# Patient Record
Sex: FEMALE | Race: WHITE | NOT HISPANIC OR LATINO | Employment: FULL TIME | URBAN - NONMETROPOLITAN AREA
[De-identification: names, ages, dates, MRNs, and addresses within clinical notes are randomized per-mention and may not be internally consistent; named-entity substitution may affect disease eponyms.]

---

## 2017-02-01 ENCOUNTER — APPOINTMENT (OUTPATIENT)
Dept: GENERAL RADIOLOGY | Facility: HOSPITAL | Age: 22
End: 2017-02-01

## 2017-02-01 ENCOUNTER — HOSPITAL ENCOUNTER (EMERGENCY)
Facility: HOSPITAL | Age: 22
Discharge: HOME | End: 2017-02-01
Attending: FAMILY MEDICINE | Admitting: FAMILY MEDICINE

## 2017-02-01 ENCOUNTER — APPOINTMENT (OUTPATIENT)
Dept: CT IMAGING | Facility: HOSPITAL | Age: 22
End: 2017-02-01

## 2017-02-01 VITALS
TEMPERATURE: 97.9 F | DIASTOLIC BLOOD PRESSURE: 62 MMHG | WEIGHT: 115 LBS | BODY MASS INDEX: 17.43 KG/M2 | SYSTOLIC BLOOD PRESSURE: 103 MMHG | HEIGHT: 68 IN | HEART RATE: 67 BPM | OXYGEN SATURATION: 99 % | RESPIRATION RATE: 16 BRPM

## 2017-02-01 DIAGNOSIS — R55 SYNCOPE, UNSPECIFIED SYNCOPE TYPE: Primary | ICD-10-CM

## 2017-02-01 LAB
ALBUMIN SERPL-MCNC: 3.9 G/DL (ref 3.5–5)
ALBUMIN/GLOB SERPL: 1.3 G/DL (ref 1–2)
ALP SERPL-CCNC: 64 U/L (ref 38–126)
ALT SERPL W P-5'-P-CCNC: 23 U/L (ref 13–69)
AMPHET+METHAMPHET UR QL: NEGATIVE
AMPHETAMINES UR QL: NEGATIVE
ANION GAP SERPL CALCULATED.3IONS-SCNC: 15.6 MMOL/L
AST SERPL-CCNC: 19 U/L (ref 15–46)
B-HCG UR QL: NEGATIVE
BACTERIA UR QL AUTO: ABNORMAL /HPF
BARBITURATES UR QL SCN: NEGATIVE
BASOPHILS # BLD AUTO: 0.03 10*3/MM3 (ref 0–0.2)
BASOPHILS NFR BLD AUTO: 0.5 % (ref 0–2.5)
BENZODIAZ UR QL SCN: NEGATIVE
BILIRUB SERPL-MCNC: 0.4 MG/DL (ref 0.2–1.3)
BILIRUB UR QL STRIP: NEGATIVE
BUN BLD-MCNC: 17 MG/DL (ref 7–20)
BUN/CREAT SERPL: 28.3 (ref 7.1–23.5)
BUPRENORPHINE SERPL-MCNC: NEGATIVE NG/ML
CALCIUM SPEC-SCNC: 8.8 MG/DL (ref 8.4–10.2)
CANNABINOIDS SERPL QL: NEGATIVE
CHLORIDE SERPL-SCNC: 105 MMOL/L (ref 98–107)
CLARITY UR: CLEAR
CO2 SERPL-SCNC: 28 MMOL/L (ref 26–30)
COCAINE UR QL: NEGATIVE
COLOR UR: ABNORMAL
CREAT BLD-MCNC: 0.6 MG/DL (ref 0.6–1.3)
D-DIMER, QUANTITATIVE (MAD,POW, STR): 169 NG/ML (FEU) (ref 0–500)
DEPRECATED RDW RBC AUTO: 39.9 FL (ref 37–54)
EOSINOPHIL # BLD AUTO: 0.08 10*3/MM3 (ref 0–0.7)
EOSINOPHIL NFR BLD AUTO: 1.3 % (ref 0–7)
ERYTHROCYTE [DISTWIDTH] IN BLOOD BY AUTOMATED COUNT: 11.7 % (ref 11.5–14.5)
GFR SERPL CREATININE-BSD FRML MDRD: 126 ML/MIN/1.73
GLOBULIN UR ELPH-MCNC: 3.1 GM/DL
GLUCOSE BLD-MCNC: 100 MG/DL (ref 74–98)
GLUCOSE BLDC GLUCOMTR-MCNC: 85 MG/DL (ref 70–130)
GLUCOSE UR STRIP-MCNC: NEGATIVE MG/DL
HCT VFR BLD AUTO: 36.8 % (ref 37–47)
HGB BLD-MCNC: 12.3 G/DL (ref 12–16)
HGB UR QL STRIP.AUTO: NEGATIVE
HOLD SPECIMEN: NORMAL
HOLD SPECIMEN: NORMAL
HYALINE CASTS UR QL AUTO: ABNORMAL /LPF
IMM GRANULOCYTES # BLD: 0.01 10*3/MM3 (ref 0–0.06)
IMM GRANULOCYTES NFR BLD: 0.2 % (ref 0–0.6)
KETONES UR QL STRIP: ABNORMAL
LEUKOCYTE ESTERASE UR QL STRIP.AUTO: NEGATIVE
LYMPHOCYTES # BLD AUTO: 2.49 10*3/MM3 (ref 0.6–3.4)
LYMPHOCYTES NFR BLD AUTO: 39.5 % (ref 10–50)
MCH RBC QN AUTO: 31.1 PG (ref 27–31)
MCHC RBC AUTO-ENTMCNC: 33.4 G/DL (ref 30–37)
MCV RBC AUTO: 93.2 FL (ref 81–99)
METHADONE UR QL SCN: NEGATIVE
MONOCYTES # BLD AUTO: 0.58 10*3/MM3 (ref 0–0.9)
MONOCYTES NFR BLD AUTO: 9.2 % (ref 0–12)
MUCOUS THREADS URNS QL MICRO: ABNORMAL /HPF
NEUTROPHILS # BLD AUTO: 3.11 10*3/MM3 (ref 2–6.9)
NEUTROPHILS NFR BLD AUTO: 49.3 % (ref 37–80)
NITRITE UR QL STRIP: NEGATIVE
NRBC BLD MANUAL-RTO: 0 /100 WBC (ref 0–0)
OPIATES UR QL: NEGATIVE
OXYCODONE UR QL SCN: NEGATIVE
PCP UR QL SCN: NEGATIVE
PH UR STRIP.AUTO: 6 [PH] (ref 5–8)
PLATELET # BLD AUTO: 232 10*3/MM3 (ref 130–400)
PMV BLD AUTO: 9.6 FL (ref 6–12)
POTASSIUM BLD-SCNC: 3.6 MMOL/L (ref 3.5–5.1)
PROPOXYPH UR QL: NEGATIVE
PROT SERPL-MCNC: 7 G/DL (ref 6.3–8.2)
PROT UR QL STRIP: ABNORMAL
RBC # BLD AUTO: 3.95 10*6/MM3 (ref 4.2–5.4)
RBC # UR: ABNORMAL /HPF
REF LAB TEST METHOD: ABNORMAL
SODIUM BLD-SCNC: 145 MMOL/L (ref 137–145)
SP GR UR STRIP: >=1.03 (ref 1–1.03)
SQUAMOUS #/AREA URNS HPF: ABNORMAL /HPF
TRICYCLICS UR QL SCN: NEGATIVE
TROPONIN I SERPL-MCNC: <0.012 NG/ML (ref 0–0.03)
UROBILINOGEN UR QL STRIP: ABNORMAL
WBC NRBC COR # BLD: 6.3 10*3/MM3 (ref 4.8–10.8)
WBC UR QL AUTO: ABNORMAL /HPF
WHOLE BLOOD HOLD SPECIMEN: NORMAL
WHOLE BLOOD HOLD SPECIMEN: NORMAL

## 2017-02-01 PROCEDURE — 82962 GLUCOSE BLOOD TEST: CPT

## 2017-02-01 PROCEDURE — 85025 COMPLETE CBC W/AUTO DIFF WBC: CPT | Performed by: FAMILY MEDICINE

## 2017-02-01 PROCEDURE — 81001 URINALYSIS AUTO W/SCOPE: CPT | Performed by: FAMILY MEDICINE

## 2017-02-01 PROCEDURE — 71010 HC CHEST PA OR AP: CPT

## 2017-02-01 PROCEDURE — 80053 COMPREHEN METABOLIC PANEL: CPT | Performed by: FAMILY MEDICINE

## 2017-02-01 PROCEDURE — 70450 CT HEAD/BRAIN W/O DYE: CPT

## 2017-02-01 PROCEDURE — 93005 ELECTROCARDIOGRAM TRACING: CPT | Performed by: FAMILY MEDICINE

## 2017-02-01 PROCEDURE — 96360 HYDRATION IV INFUSION INIT: CPT

## 2017-02-01 PROCEDURE — 85379 FIBRIN DEGRADATION QUANT: CPT | Performed by: FAMILY MEDICINE

## 2017-02-01 PROCEDURE — 96361 HYDRATE IV INFUSION ADD-ON: CPT

## 2017-02-01 PROCEDURE — 81025 URINE PREGNANCY TEST: CPT | Performed by: FAMILY MEDICINE

## 2017-02-01 PROCEDURE — 99284 EMERGENCY DEPT VISIT MOD MDM: CPT

## 2017-02-01 PROCEDURE — 84484 ASSAY OF TROPONIN QUANT: CPT | Performed by: FAMILY MEDICINE

## 2017-02-01 PROCEDURE — 80306 DRUG TEST PRSMV INSTRMNT: CPT | Performed by: FAMILY MEDICINE

## 2017-02-01 RX ORDER — SODIUM CHLORIDE 0.9 % (FLUSH) 0.9 %
10 SYRINGE (ML) INJECTION AS NEEDED
Status: DISCONTINUED | OUTPATIENT
Start: 2017-02-01 | End: 2017-02-01 | Stop reason: HOSPADM

## 2017-02-01 RX ADMIN — SODIUM CHLORIDE 1000 ML: 9 INJECTION, SOLUTION INTRAVENOUS at 01:14

## 2017-02-01 NOTE — ED PROVIDER NOTES
Subjective   HPI Comments: 21 yr old WF presenting to ED from lab upstairs for 2 episodes of syncope. Pt with history of ongoing sporadic syncope. Pt had initial syncope around midnight. Pt was red in face, shaky, weak, co-worker helped her to break room, then 10-15 min later had similar symptoms but then dozing in and out of consciousness and brought to ED. Co-worker never reported full syncope but more altered mental status and lethargy. Pt states she has been doing this for months. No chest pain. No history of CT of head. Last period 1 week ago. No abd pain or n/v. No history of seizures. No current headache. No visual changes. Pt states she feels tired. Multiple work-ups in the past in ED and seen by ENT for ? Menierre's disease. No SOA.       History provided by:  Patient      Review of Systems   Constitutional: Positive for activity change and fatigue. Negative for chills, diaphoresis and fever.   HENT: Negative for ear pain, rhinorrhea, sinus pressure, sore throat, trouble swallowing and voice change.    Eyes: Negative for photophobia, pain and visual disturbance.   Respiratory: Negative for cough and shortness of breath.    Cardiovascular: Negative for chest pain.   Gastrointestinal: Negative for abdominal pain, diarrhea, nausea and vomiting.   Genitourinary: Negative for dysuria and flank pain.   Musculoskeletal: Negative for back pain, neck pain and neck stiffness.   Skin: Negative for color change, rash and wound.   Neurological: Positive for dizziness, syncope, weakness and light-headedness. Negative for tremors, seizures and headaches.   Hematological: Negative for adenopathy. Does not bruise/bleed easily.   Psychiatric/Behavioral: Positive for confusion.       Past Medical History   Diagnosis Date   • Allergic    • Anemia    • Anxiety    • Arthritis 9/13/2016   • Chronic headaches 9/13/2016   • Collapse    • COPD (chronic obstructive pulmonary disease) 9/13/2016   • Deep vein thrombosis    • Depression     • Hematuria    • Hepatitis B 9/13/2016   • History of drug abuse    • Pneumonia 9/13/2016       Allergies   Allergen Reactions   • Penicillins Hives   • Iodine Rash   • Latex Rash       Past Surgical History   Procedure Laterality Date   • Tonsillectomy     • Adenoidectomy     • Cystoscopy     • Brain surgery       multiple        Family History   Problem Relation Age of Onset   • Heart disease Mother    • Hypertension Mother    • Heart disease Father    • Hypertension Father    • Hyperlipidemia Father    • Liver disease Father    • Arthritis Maternal Grandmother    • Cancer Maternal Grandmother    • Kidney disease Maternal Grandmother    • Stroke Maternal Grandmother    • Arthritis Maternal Grandfather    • Cancer Maternal Grandfather    • Stroke Maternal Grandfather    • Cancer Paternal Grandmother    • Cancer Paternal Grandfather    • Stroke Paternal Grandfather        Social History     Social History   • Marital status: Single     Spouse name: N/A   • Number of children: N/A   • Years of education: N/A     Social History Main Topics   • Smoking status: Never Smoker   • Smokeless tobacco: Never Used   • Alcohol use No   • Drug use: No   • Sexual activity: Defer     Other Topics Concern   • None     Social History Narrative   • None           Objective   Physical Exam   Constitutional: She is oriented to person, place, and time. She appears well-developed and well-nourished. She appears lethargic. No distress.   HENT:   Head: Normocephalic and atraumatic.   Right Ear: Tympanic membrane is not injected.   Left Ear: Tympanic membrane is not injected.   Nose: Nose normal.   Mouth/Throat: Oropharynx is clear and moist.   Eyes: Conjunctivae and EOM are normal. Pupils are equal, round, and reactive to light. Right eye exhibits no nystagmus. Left eye exhibits no nystagmus.   Neck: Normal range of motion. Neck supple.   Cardiovascular: Normal rate, regular rhythm and normal heart sounds.    Pulmonary/Chest: Effort  normal and breath sounds normal.   Abdominal: Soft. There is no tenderness.   Musculoskeletal: Normal range of motion.   Lymphadenopathy:     She has no cervical adenopathy.   Neurological: She is oriented to person, place, and time. She appears lethargic. No cranial nerve deficit or sensory deficit. She exhibits normal muscle tone. Coordination normal. GCS eye subscore is 4. GCS verbal subscore is 5. GCS motor subscore is 6.   Skin: Skin is warm and dry. No rash noted. She is not diaphoretic. There is pallor.   Psychiatric: Her speech is normal. Her affect is blunt. She is slowed. She is not actively hallucinating.       Procedures  ECG - HR 83; NSR; Normal CT/QRS; Normal axis; No ST-T changes  CT of Head - No acute intracranial process  CXR - No acute cardiopulmonary process       ED Course  ED Course      2:42 - Pt stable; No acute changes, seizure, further syncope, palpitations, or subjective complaint. CT head negative, negative labs. Pt without lethargy, back to baseline. Will d/c home and set up to see Neurology for EEG study and then to cardiology for Echo or further work-up.     CT Head Without Contrast   ED Interpretation   Radiology read - No acute intracranial process      XR Chest 1 View   ED Interpretation   No acute cardiopulmonary process              MDM    Final diagnoses:   Syncope, unspecified syncope type            Thor Vasquez, DO  02/01/17 0247       Thor Vasquez, DO  02/01/17 0520

## 2017-02-05 ENCOUNTER — HOSPITAL ENCOUNTER (EMERGENCY)
Facility: HOSPITAL | Age: 22
Discharge: HOME | End: 2017-02-05
Attending: EMERGENCY MEDICINE | Admitting: EMERGENCY MEDICINE

## 2017-02-05 ENCOUNTER — APPOINTMENT (OUTPATIENT)
Dept: GENERAL RADIOLOGY | Facility: HOSPITAL | Age: 22
End: 2017-02-05

## 2017-02-05 ENCOUNTER — APPOINTMENT (OUTPATIENT)
Dept: NEUROLOGY | Facility: HOSPITAL | Age: 22
End: 2017-02-05
Attending: EMERGENCY MEDICINE

## 2017-02-05 VITALS
SYSTOLIC BLOOD PRESSURE: 110 MMHG | HEIGHT: 68 IN | BODY MASS INDEX: 18.19 KG/M2 | DIASTOLIC BLOOD PRESSURE: 69 MMHG | WEIGHT: 120 LBS | TEMPERATURE: 98.2 F | RESPIRATION RATE: 18 BRPM | OXYGEN SATURATION: 98 % | HEART RATE: 83 BPM

## 2017-02-05 DIAGNOSIS — R55 SYNCOPE, UNSPECIFIED SYNCOPE TYPE: Primary | ICD-10-CM

## 2017-02-05 LAB
ALBUMIN SERPL-MCNC: 4.7 G/DL (ref 3.2–4.8)
ALBUMIN/GLOB SERPL: 1.6 G/DL (ref 1.5–2.5)
ALP SERPL-CCNC: 95 U/L (ref 25–100)
ALT SERPL W P-5'-P-CCNC: 6 U/L (ref 7–40)
AMPHET+METHAMPHET UR QL: NEGATIVE
AMPHETAMINES UR QL: NEGATIVE
ANION GAP SERPL CALCULATED.3IONS-SCNC: 2 MMOL/L (ref 3–11)
APTT PPP: 30.5 SECONDS (ref 24–31)
AST SERPL-CCNC: 14 U/L (ref 0–33)
B-HCG UR QL: NEGATIVE
BACTERIA UR QL AUTO: ABNORMAL /HPF
BARBITURATES UR QL SCN: NEGATIVE
BASOPHILS # BLD AUTO: 0.02 10*3/MM3 (ref 0–0.2)
BASOPHILS NFR BLD AUTO: 0.3 % (ref 0–1)
BENZODIAZ UR QL SCN: NEGATIVE
BILIRUB SERPL-MCNC: 0.4 MG/DL (ref 0.3–1.2)
BILIRUB UR QL STRIP: NEGATIVE
BUN BLD-MCNC: 10 MG/DL (ref 9–23)
BUN/CREAT SERPL: 16.7 (ref 7–25)
BUPRENORPHINE SERPL-MCNC: NEGATIVE NG/ML
CALCIUM SPEC-SCNC: 9.8 MG/DL (ref 8.7–10.4)
CANNABINOIDS SERPL QL: NEGATIVE
CHLORIDE SERPL-SCNC: 107 MMOL/L (ref 99–109)
CLARITY UR: CLEAR
CO2 SERPL-SCNC: 32 MMOL/L (ref 20–31)
COCAINE UR QL: NEGATIVE
COLOR UR: YELLOW
CREAT BLD-MCNC: 0.6 MG/DL (ref 0.6–1.3)
DEPRECATED RDW RBC AUTO: 40.4 FL (ref 37–54)
EOSINOPHIL # BLD AUTO: 0.07 10*3/MM3 (ref 0.1–0.3)
EOSINOPHIL NFR BLD AUTO: 1.2 % (ref 0–3)
ERYTHROCYTE [DISTWIDTH] IN BLOOD BY AUTOMATED COUNT: 11.9 % (ref 11.3–14.5)
GFR SERPL CREATININE-BSD FRML MDRD: 126 ML/MIN/1.73
GLOBULIN UR ELPH-MCNC: 3 GM/DL
GLUCOSE BLD-MCNC: 78 MG/DL (ref 70–100)
GLUCOSE UR STRIP-MCNC: NEGATIVE MG/DL
HCT VFR BLD AUTO: 40.8 % (ref 34.5–44)
HGB BLD-MCNC: 13.6 G/DL (ref 11.5–15.5)
HGB UR QL STRIP.AUTO: NEGATIVE
HYALINE CASTS UR QL AUTO: ABNORMAL /LPF
IMM GRANULOCYTES # BLD: 0.01 10*3/MM3 (ref 0–0.03)
IMM GRANULOCYTES NFR BLD: 0.2 % (ref 0–0.6)
INR PPP: 1.04
KETONES UR QL STRIP: NEGATIVE
LEUKOCYTE ESTERASE UR QL STRIP.AUTO: ABNORMAL
LYMPHOCYTES # BLD AUTO: 1.81 10*3/MM3 (ref 0.6–4.8)
LYMPHOCYTES NFR BLD AUTO: 30 % (ref 24–44)
MAGNESIUM SERPL-MCNC: 1.8 MG/DL (ref 1.3–2.7)
MCH RBC QN AUTO: 31.3 PG (ref 27–31)
MCHC RBC AUTO-ENTMCNC: 33.3 G/DL (ref 32–36)
MCV RBC AUTO: 94 FL (ref 80–99)
METHADONE UR QL SCN: NEGATIVE
MONOCYTES # BLD AUTO: 0.51 10*3/MM3 (ref 0–1)
MONOCYTES NFR BLD AUTO: 8.5 % (ref 0–12)
NEUTROPHILS # BLD AUTO: 3.61 10*3/MM3 (ref 1.5–8.3)
NEUTROPHILS NFR BLD AUTO: 59.8 % (ref 41–71)
NITRITE UR QL STRIP: NEGATIVE
OPIATES UR QL: NEGATIVE
OXYCODONE UR QL SCN: NEGATIVE
PCP UR QL SCN: NEGATIVE
PH UR STRIP.AUTO: 6 [PH] (ref 5–8)
PLATELET # BLD AUTO: 254 10*3/MM3 (ref 150–450)
PMV BLD AUTO: 9.4 FL (ref 6–12)
POTASSIUM BLD-SCNC: 3.5 MMOL/L (ref 3.5–5.5)
PROPOXYPH UR QL: NEGATIVE
PROT SERPL-MCNC: 7.7 G/DL (ref 5.7–8.2)
PROT UR QL STRIP: NEGATIVE
PROTHROMBIN TIME: 11.3 SECONDS (ref 9.6–11.5)
RBC # BLD AUTO: 4.34 10*6/MM3 (ref 3.89–5.14)
RBC # UR: ABNORMAL /HPF
REF LAB TEST METHOD: ABNORMAL
SODIUM BLD-SCNC: 141 MMOL/L (ref 132–146)
SP GR UR STRIP: 1.01 (ref 1–1.03)
SQUAMOUS #/AREA URNS HPF: ABNORMAL /HPF
TRICYCLICS UR QL SCN: NEGATIVE
TSH SERPL DL<=0.05 MIU/L-ACNC: 0.9 MIU/ML (ref 0.35–5.35)
UROBILINOGEN UR QL STRIP: ABNORMAL
WBC NRBC COR # BLD: 6.03 10*3/MM3 (ref 4.5–13.5)
WBC UR QL AUTO: ABNORMAL /HPF

## 2017-02-05 PROCEDURE — 85025 COMPLETE CBC W/AUTO DIFF WBC: CPT | Performed by: EMERGENCY MEDICINE

## 2017-02-05 PROCEDURE — 93005 ELECTROCARDIOGRAM TRACING: CPT | Performed by: EMERGENCY MEDICINE

## 2017-02-05 PROCEDURE — 95816 EEG AWAKE AND DROWSY: CPT

## 2017-02-05 PROCEDURE — 96360 HYDRATION IV INFUSION INIT: CPT

## 2017-02-05 PROCEDURE — 96361 HYDRATE IV INFUSION ADD-ON: CPT

## 2017-02-05 PROCEDURE — 81025 URINE PREGNANCY TEST: CPT | Performed by: EMERGENCY MEDICINE

## 2017-02-05 PROCEDURE — 81001 URINALYSIS AUTO W/SCOPE: CPT | Performed by: EMERGENCY MEDICINE

## 2017-02-05 PROCEDURE — 80306 DRUG TEST PRSMV INSTRMNT: CPT | Performed by: EMERGENCY MEDICINE

## 2017-02-05 PROCEDURE — 85610 PROTHROMBIN TIME: CPT | Performed by: EMERGENCY MEDICINE

## 2017-02-05 PROCEDURE — 71010 HC CHEST PA OR AP: CPT

## 2017-02-05 PROCEDURE — 84443 ASSAY THYROID STIM HORMONE: CPT | Performed by: EMERGENCY MEDICINE

## 2017-02-05 PROCEDURE — 80053 COMPREHEN METABOLIC PANEL: CPT | Performed by: EMERGENCY MEDICINE

## 2017-02-05 PROCEDURE — 85730 THROMBOPLASTIN TIME PARTIAL: CPT | Performed by: EMERGENCY MEDICINE

## 2017-02-05 PROCEDURE — 83735 ASSAY OF MAGNESIUM: CPT | Performed by: EMERGENCY MEDICINE

## 2017-02-05 PROCEDURE — 87086 URINE CULTURE/COLONY COUNT: CPT | Performed by: EMERGENCY MEDICINE

## 2017-02-05 PROCEDURE — 99285 EMERGENCY DEPT VISIT HI MDM: CPT

## 2017-02-05 RX ORDER — SODIUM CHLORIDE 9 MG/ML
125 INJECTION, SOLUTION INTRAVENOUS CONTINUOUS
Status: DISCONTINUED | OUTPATIENT
Start: 2017-02-05 | End: 2017-02-05 | Stop reason: HOSPADM

## 2017-02-05 RX ADMIN — SODIUM CHLORIDE 500 ML: 9 INJECTION, SOLUTION INTRAVENOUS at 14:30

## 2017-02-05 NOTE — DISCHARGE INSTRUCTIONS
Follow up with Dr. Lawrence for evaluation of your syncopal episodes.  Your EEG today was negative, but he needs to see you in follow-up to evaluate these episodes and discussed further testing or imaging studies as required    Follow up with your primary medical provider for re-evaluation of your current symptoms.     Follow up with Dr. Jimenez, cardiology for evaluation of a cardiac etiology of your symptoms.    No dangerous activity with no bathing alone no swimming no elevated work or work around dangerous tools or substances    No driving per K RS for 3 months or until released by your doctor    Immediately return to the emergency department for any new or worsening symptoms.

## 2017-02-05 NOTE — ED PROVIDER NOTES
Subjective   HPI Comments: Mrs. Cleveland is a 21 y.o female who presents to the ED c/o syncope earlier today. She notes that she has an average of two episodes of syncope every week with increasing frequency over the last couple months. She notes that her syncope is always preceded by feelings of drowsiness. She states she is unable to remember anything after her drowsiness. She also notes that she had a sz a couple days ago and reports of shaking, eyes open, cold, head nodding, tremors, and total LOC. She denies any other acute sx or hx at this time.       Patient is a 21 y.o. female presenting with syncope.   History provided by:  Patient  Syncope   Episode history:  Multiple  Most recent episode:  Today  Timing:  Sporadic  Progression:  Unchanged  Chronicity:  Recurrent  Witnessed: no    Relieved by:  None tried  Worsened by:  Nothing  Ineffective treatments:  None tried  Associated symptoms: no fever, no nausea and no vomiting        Review of Systems   Constitutional: Negative for chills and fever.   HENT: Negative for congestion, rhinorrhea and sore throat.    Respiratory: Negative for cough.    Cardiovascular: Positive for syncope.   Gastrointestinal: Negative for abdominal pain, blood in stool, diarrhea, nausea and vomiting.   Neurological: Positive for syncope.   All other systems reviewed and are negative.      Past Medical History   Diagnosis Date   • Allergic    • Anemia    • Anxiety    • Arthritis 9/13/2016   • Chronic headaches 9/13/2016   • Collapse    • Depression    • Hematuria    • History of drug abuse      patient denies this   • Pneumonia 9/13/2016       Allergies   Allergen Reactions   • Penicillins Hives   • Iodine Rash   • Latex Rash       Past Surgical History   Procedure Laterality Date   • Tonsillectomy     • Adenoidectomy     • Cystoscopy     • Cystoscopy         Family History   Problem Relation Age of Onset   • Heart disease Mother    • Hypertension Mother    • Heart disease Father    •  Hypertension Father    • Hyperlipidemia Father    • Liver disease Father    • Arthritis Maternal Grandmother    • Cancer Maternal Grandmother    • Kidney disease Maternal Grandmother    • Stroke Maternal Grandmother    • Arthritis Maternal Grandfather    • Cancer Maternal Grandfather    • Stroke Maternal Grandfather    • Cancer Paternal Grandmother    • Cancer Paternal Grandfather    • Stroke Paternal Grandfather        Social History     Social History   • Marital status: Single     Spouse name: N/A   • Number of children: N/A   • Years of education: N/A     Social History Main Topics   • Smoking status: Never Smoker   • Smokeless tobacco: Never Used   • Alcohol use Yes      Comment: seldom   • Drug use: No   • Sexual activity: Defer     Other Topics Concern   • None     Social History Narrative    Lives with her parents         Objective   Physical Exam   Constitutional: She is oriented to person, place, and time. She appears well-developed and well-nourished. No distress.   HENT:   Head: Normocephalic and atraumatic.   Mouth/Throat: Oropharynx is clear and moist.   Eyes: Conjunctivae are normal.   Neck: Normal range of motion. Neck supple.   Cardiovascular: Normal rate, regular rhythm, normal heart sounds and intact distal pulses.    Pulmonary/Chest: Effort normal and breath sounds normal. No respiratory distress.   Abdominal: Soft. There is no tenderness.   Musculoskeletal: Normal range of motion.   Neurological: She is alert and oriented to person, place, and time. She has normal strength and normal reflexes. No cranial nerve deficit or sensory deficit.   Skin: Skin is warm and dry.   Psychiatric: She has a normal mood and affect. Her speech is normal and behavior is normal.   Nursing note and vitals reviewed.      Procedures         ED Course  ED Course   Comment By Time   Discussed findings at length with the patient.  Her symptoms are gone on for 2 years, but accelerated over the last several months.  I  reviewed her old records and she had a CT scan from last week which was negative.  I've ordered an EEG today in view of the possibility that she may have had seizures.  I looked over her bystanders account of her last event and does not appear that there was any tonic-clonic activity or true seizure type activity.I discussed MRI with the patient, but she just had a CT scan done within the last week with greater than 2 years worth of symptoms.  I discussed the risks and benefits of this.  She will have this done as an outpatient declines inpatient ER MRI today. Lc Castaneda MD 02/05 1419   Patient's examination is unremarkable currently.  She has no neurologic deficit.  I discussed her restriction from driving for 3 months per K RS.  Patient verbalizes understanding and agreement. Lc Castaneda MD 02/05 6710   Reviewed the findings here with the patient at length.  I discussed that her EEG would be read within the next 24 hours and will be available to Dr. Lawrence this week in the office.I reviewed her old records at length along with her previous studies including her imaging studies.  I reviewed this with her.  In discussion with the patient this is her 11th ER visit for identical symptoms.  She has never been An appointment with neurology.  She has had no successful cardiovascular evaluation and reports she is dismissed multiple appointments with her PCP as well and has not followed up with her recently.I discussed the need to have this successfully evaluated on outpatient basis.  I discussed neurologic, cardiac, and PCP follow-up.  I discussed again driving restrictionVery pleasant patient verbalizes understanding and agreement with the plan of care Lc Castaneda MD 02/05 3902   Dr. Shine, neurology called.  The patient's EEG is negative for epilepsy Lc Castaneda MD 02/05 4162   Bystanders with the patient arrived and reportedly indicated the patient that this was syncope without visualized seizure activity  from the bystanders Lc Castaneda MD 02/05 1703     Recent Results (from the past 24 hour(s))   Urinalysis With / Culture If Indicated    Collection Time: 02/05/17  1:39 PM   Result Value Ref Range    Color, UA Yellow Yellow, Straw    Appearance, UA Clear Clear    pH, UA 6.0 5.0 - 8.0    Specific Gravity, UA 1.010 1.001 - 1.030    Glucose, UA Negative Negative    Ketones, UA Negative Negative    Bilirubin, UA Negative Negative    Blood, UA Negative Negative    Protein, UA Negative Negative    Leuk Esterase, UA Trace (A) Negative    Nitrite, UA Negative Negative    Urobilinogen, UA 0.2 E.U./dL 0.2 - 1.0 E.U./dL   Urinalysis, Microscopic Only    Collection Time: 02/05/17  1:39 PM   Result Value Ref Range    RBC, UA None Seen None Seen, 0-2 /HPF    WBC, UA 6-12 (A) None Seen /HPF    Bacteria, UA None Seen None Seen, Trace /HPF    Squamous Epithelial Cells, UA 0-2 None Seen, 0-2 /HPF    Hyaline Casts, UA None Seen 0 - 6 /LPF    Methodology Automated Microscopy    Pregnancy, Urine    Collection Time: 02/05/17  1:39 PM   Result Value Ref Range    HCG, Urine QL Negative Negative   Urine Drug Screen    Collection Time: 02/05/17  1:39 PM   Result Value Ref Range    THC, Screen, Urine Negative Negative    Phencyclidine (PCP), Urine Negative Negative    Cocaine Screen, Urine Negative Negative    Methamphetamine, Urine Negative Negative    Opiate Screen Negative Negative    Amphetamine Screen, Urine Negative Negative    Benzodiazepine Screen, Urine Negative Negative    Tricyclic Antidepressants Screen Negative Negative    Methadone Screen, Urine Negative Negative    Barbiturates Screen, Urine Negative Negative    Oxycodone Screen, Urine Negative Negative    Propoxyphene Screen Negative Negative    Buprenorphine, Screen, Urine Negative Negative   Comprehensive Metabolic Panel    Collection Time: 02/05/17  2:41 PM   Result Value Ref Range    Glucose 78 70 - 100 mg/dL    BUN 10 9 - 23 mg/dL    Creatinine 0.60 0.60 - 1.30 mg/dL     Sodium 141 132 - 146 mmol/L    Potassium 3.5 3.5 - 5.5 mmol/L    Chloride 107 99 - 109 mmol/L    CO2 32.0 (H) 20.0 - 31.0 mmol/L    Calcium 9.8 8.7 - 10.4 mg/dL    Total Protein 7.7 5.7 - 8.2 g/dL    Albumin 4.70 3.20 - 4.80 g/dL    ALT (SGPT) 6 (L) 7 - 40 U/L    AST (SGOT) 14 0 - 33 U/L    Alkaline Phosphatase 95 25 - 100 U/L    Total Bilirubin 0.4 0.3 - 1.2 mg/dL    eGFR Non African Amer 126 >60 mL/min/1.73    Globulin 3.0 gm/dL    A/G Ratio 1.6 1.5 - 2.5 g/dL    BUN/Creatinine Ratio 16.7 7.0 - 25.0    Anion Gap 2.0 (L) 3.0 - 11.0 mmol/L   Protime-INR    Collection Time: 02/05/17  2:41 PM   Result Value Ref Range    Protime 11.3 9.6 - 11.5 Seconds    INR 1.04    aPTT    Collection Time: 02/05/17  2:41 PM   Result Value Ref Range    PTT 30.5 24.0 - 31.0 seconds   Magnesium    Collection Time: 02/05/17  2:41 PM   Result Value Ref Range    Magnesium 1.8 1.3 - 2.7 mg/dL   TSH    Collection Time: 02/05/17  2:41 PM   Result Value Ref Range    TSH 0.899 0.350 - 5.350 mIU/mL   CBC Auto Differential    Collection Time: 02/05/17  2:41 PM   Result Value Ref Range    WBC 6.03 4.50 - 13.50 10*3/mm3    RBC 4.34 3.89 - 5.14 10*6/mm3    Hemoglobin 13.6 11.5 - 15.5 g/dL    Hematocrit 40.8 34.5 - 44.0 %    MCV 94.0 80.0 - 99.0 fL    MCH 31.3 (H) 27.0 - 31.0 pg    MCHC 33.3 32.0 - 36.0 g/dL    RDW 11.9 11.3 - 14.5 %    RDW-SD 40.4 37.0 - 54.0 fl    MPV 9.4 6.0 - 12.0 fL    Platelets 254 150 - 450 10*3/mm3    Neutrophil % 59.8 41.0 - 71.0 %    Lymphocyte % 30.0 24.0 - 44.0 %    Monocyte % 8.5 0.0 - 12.0 %    Eosinophil % 1.2 0.0 - 3.0 %    Basophil % 0.3 0.0 - 1.0 %    Immature Grans % 0.2 0.0 - 0.6 %    Neutrophils, Absolute 3.61 1.50 - 8.30 10*3/mm3    Lymphocytes, Absolute 1.81 0.60 - 4.80 10*3/mm3    Monocytes, Absolute 0.51 0.00 - 1.00 10*3/mm3    Eosinophils, Absolute 0.07 (L) 0.10 - 0.30 10*3/mm3    Basophils, Absolute 0.02 0.00 - 0.20 10*3/mm3    Immature Grans, Absolute 0.01 0.00 - 0.03 10*3/mm3     Note: In addition to  lab results from this visit, the labs listed above may include labs taken at another facility or during a different encounter within the last 24 hours. Please correlate lab times with ED admission and discharge times for further clarification of the services performed during this visit.    XR Chest 1 View    (Results Pending)     Vitals:    02/05/17 1600 02/05/17 1601 02/05/17 1630 02/05/17 1702   BP: 111/68  110/69    BP Location:       Patient Position:       Pulse:  90 81 83   Resp:    18   Temp:       TempSrc:       SpO2:  96% 97% 98%   Weight:       Height:         Medications   sodium chloride 0.9 % infusion (not administered)   sodium chloride 0.9 % bolus 500 mL (0 mL Intravenous Stopped 2/5/17 1655)     ECG/EMG Results (last 24 hours)     Procedure Component Value Units Date/Time    ECG 12 Lead [[id number]] Collected:  02/05/17 1431     Updated:  02/05/17 1618    Narrative:       Test Reason : syncope  Blood Pressure : **/** mmHG  Vent. Rate : 089 BPM     Atrial Rate : 089 BPM     P-R Int : 140 ms          QRS Dur : 078 ms      QT Int : 360 ms       P-R-T Axes : 079 071 058 degrees     QTc Int : 438 ms    Normal sinus rhythm  No previous ECGs available  Confirmed by GABRIELE EDGE, RADHA ([id number]) on 2/5/2017 4:18:04 PM    Referred By:  EDMD           Confirmed By:RADHA SUAZO MD                          MDM  Number of Diagnoses or Management Options  Syncope, unspecified syncope type: new and requires workup     Amount and/or Complexity of Data Reviewed  Clinical lab tests: ordered and reviewed  Tests in the radiology section of CPT®: ordered and reviewed  Review and summarize past medical records: yes  Independent visualization of images, tracings, or specimens: yes        Final diagnoses:   Syncope, unspecified syncope type       Documentation assistance provided by bryn MARCH.  Information recorded by the scribe was done at my direction and has been verified and validated by me.     Nena March  02/05/17  1417       Georgina Juliocesar  02/05/17 1706       Lc Castaneda MD  02/05/17 1704

## 2017-02-07 ENCOUNTER — OFFICE VISIT (OUTPATIENT)
Dept: FAMILY MEDICINE CLINIC | Facility: CLINIC | Age: 22
End: 2017-02-07

## 2017-02-07 VITALS
OXYGEN SATURATION: 100 % | RESPIRATION RATE: 16 BRPM | HEIGHT: 68 IN | TEMPERATURE: 97.5 F | BODY MASS INDEX: 18.34 KG/M2 | WEIGHT: 121 LBS | DIASTOLIC BLOOD PRESSURE: 62 MMHG | HEART RATE: 85 BPM | SYSTOLIC BLOOD PRESSURE: 95 MMHG

## 2017-02-07 DIAGNOSIS — G47.33 OSA (OBSTRUCTIVE SLEEP APNEA): ICD-10-CM

## 2017-02-07 DIAGNOSIS — R55 SYNCOPE, UNSPECIFIED SYNCOPE TYPE: Primary | ICD-10-CM

## 2017-02-07 DIAGNOSIS — R00.2 PALPITATION: ICD-10-CM

## 2017-02-07 LAB — BACTERIA SPEC AEROBE CULT: NORMAL

## 2017-02-07 PROCEDURE — 99213 OFFICE O/P EST LOW 20 MIN: CPT | Performed by: INTERNAL MEDICINE

## 2017-02-07 NOTE — PROGRESS NOTES
"Subjective   Patient ID: Elen Cleveland is a 21 y.o. female Pt is here for management of multiple medical problems.  History of Present Illness  Pt is here for management of multiple medical problems.    Increased episodes of syncope. Coworker has witnessed the problem.   Starts by staring then pale skin.  Then blotching of skin.  Not responsive and appears sleepy then shaking.   Took to er and on the way to er became aware.   Dx of absent sz made in er.     Last Tuesday was getting up to walk to a location at work and spell occurred. Was in car last Thursday during the  Last episode.     episodes of palpitation.   Hard pumping after it stop.           The following portions of the patient's history were reviewed and updated as appropriate: allergies, current medications, past family history, past medical history, past social history, past surgical history and problem list.  Review of Systems   Constitutional: Positive for fatigue.   Cardiovascular: Positive for palpitations.   Neurological: Positive for syncope. Negative for seizures.       Objective     Visit Vitals   • BP 95/62 (BP Location: Left arm, Patient Position: Sitting, Cuff Size: Adult)   • Pulse 85   • Temp 97.5 °F (36.4 °C) (Oral)   • Resp 16   • Ht 68\" (172.7 cm)   • Wt 121 lb (54.9 kg)   • SpO2 100%   • BMI 18.4 kg/m2     Physical Exam  General Appearance:    Alert, cooperative, no distress, appears stated age   Head:    Normocephalic, without obvious abnormality, atraumatic   Eyes:    PERRL, conjunctiva/corneas clear, EOM's intact           Ears:    Normal TM's and external ear canals, both ears   Nose:   Nares normal, septum midline, mucosa normal, no drainage    or sinus tenderness   Throat:   High arch palat.  `Lips, mucosa, and tongue normal; teeth and gums normal   Neck:   Supple, symmetrical, trachea midline, no adenopathy;        thyroid:  No enlargement/tenderness/nodules; no carotid    bruit or JVD   Back:     Symmetric, no " curvature, ROM normal, no CVA tenderness   Lungs:     Clear to auscultation bilaterally, respirations unlabored   Chest wall:    No tenderness or deformity   Heart:    Regular rate and rhythm, S1 and split S2 normal, no murmur, rub   or gallop   Abdomen:     Soft, non-tender, bowel sounds active all four quadrants,     no masses, no organomegaly           Extremities:   Extremities normal, atraumatic, no cyanosis or edema   Pulses:   2+ and symmetric all extremities   Skin:   Skin color, texture, turgor normal, no rashes or lesions   Lymph nodes:   Cervical, supraclavicular, and axillary nodes normal   Neurologic:   CNII-XII intact. Normal strength, sensation and reflexes       throughout       Assessment/Plan     Pt missing apts with memory difficulty and working 3rd shift.   She needs georgiana study and sz opal.         Elen was seen today for follow-up.    Diagnoses and all orders for this visit:    Syncope, unspecified syncope type  -     Ambulatory Referral to Neurology    Palpitation  -     Ambulatory Referral to Cardiology    GEORGIANA (obstructive sleep apnea)  -     Ambulatory Referral to Neurology      Return in about 4 weeks (around 3/7/2017).                   There are no Patient Instructions on file for this visit.

## 2017-02-17 ENCOUNTER — APPOINTMENT (OUTPATIENT)
Dept: GENERAL RADIOLOGY | Facility: HOSPITAL | Age: 22
End: 2017-02-17
Attending: EMERGENCY MEDICINE

## 2017-02-17 ENCOUNTER — HOSPITAL ENCOUNTER (EMERGENCY)
Facility: HOSPITAL | Age: 22
Discharge: HOME | End: 2017-02-17
Attending: EMERGENCY MEDICINE | Admitting: EMERGENCY MEDICINE

## 2017-02-17 ENCOUNTER — TELEPHONE (OUTPATIENT)
Dept: FAMILY MEDICINE CLINIC | Facility: CLINIC | Age: 22
End: 2017-02-17

## 2017-02-17 VITALS
DIASTOLIC BLOOD PRESSURE: 66 MMHG | HEART RATE: 84 BPM | OXYGEN SATURATION: 100 % | HEIGHT: 68 IN | WEIGHT: 120 LBS | TEMPERATURE: 98.7 F | RESPIRATION RATE: 20 BRPM | SYSTOLIC BLOOD PRESSURE: 105 MMHG | BODY MASS INDEX: 18.19 KG/M2

## 2017-02-17 DIAGNOSIS — R00.2 PALPITATIONS: Primary | ICD-10-CM

## 2017-02-17 LAB
AMPHET+METHAMPHET UR QL: NEGATIVE
AMPHETAMINES UR QL: NEGATIVE
ANION GAP SERPL CALCULATED.3IONS-SCNC: 10.7 MMOL/L
BACTERIA UR QL AUTO: ABNORMAL /HPF
BARBITURATES UR QL SCN: NEGATIVE
BASOPHILS # BLD AUTO: 0.02 10*3/MM3 (ref 0–0.2)
BASOPHILS NFR BLD AUTO: 0.3 % (ref 0–2.5)
BENZODIAZ UR QL SCN: NEGATIVE
BILIRUB UR QL STRIP: NEGATIVE
BUN BLD-MCNC: 15 MG/DL (ref 7–20)
BUN/CREAT SERPL: 21.4 (ref 7.1–23.5)
BUPRENORPHINE SERPL-MCNC: NEGATIVE NG/ML
CALCIUM SPEC-SCNC: 9.5 MG/DL (ref 8.4–10.2)
CANNABINOIDS SERPL QL: NEGATIVE
CHLORIDE SERPL-SCNC: 106 MMOL/L (ref 98–107)
CLARITY UR: CLEAR
CO2 SERPL-SCNC: 28 MMOL/L (ref 26–30)
COCAINE UR QL: NEGATIVE
COLOR UR: YELLOW
CREAT BLD-MCNC: 0.7 MG/DL (ref 0.6–1.3)
DEPRECATED RDW RBC AUTO: 38.9 FL (ref 37–54)
EOSINOPHIL # BLD AUTO: 0.07 10*3/MM3 (ref 0–0.7)
EOSINOPHIL NFR BLD AUTO: 1.1 % (ref 0–7)
ERYTHROCYTE [DISTWIDTH] IN BLOOD BY AUTOMATED COUNT: 11.5 % (ref 11.5–14.5)
GFR SERPL CREATININE-BSD FRML MDRD: 106 ML/MIN/1.73
GLUCOSE BLD-MCNC: 86 MG/DL (ref 74–98)
GLUCOSE BLDC GLUCOMTR-MCNC: 89 MG/DL (ref 70–130)
GLUCOSE UR STRIP-MCNC: NEGATIVE MG/DL
HCT VFR BLD AUTO: 36.8 % (ref 37–47)
HGB BLD-MCNC: 12.4 G/DL (ref 12–16)
HGB UR QL STRIP.AUTO: NEGATIVE
HOLD SPECIMEN: NORMAL
HOLD SPECIMEN: NORMAL
HYALINE CASTS UR QL AUTO: ABNORMAL /LPF
IMM GRANULOCYTES # BLD: 0.01 10*3/MM3 (ref 0–0.06)
IMM GRANULOCYTES NFR BLD: 0.2 % (ref 0–0.6)
KETONES UR QL STRIP: NEGATIVE
LEUKOCYTE ESTERASE UR QL STRIP.AUTO: ABNORMAL
LYMPHOCYTES # BLD AUTO: 2.48 10*3/MM3 (ref 0.6–3.4)
LYMPHOCYTES NFR BLD AUTO: 39.3 % (ref 10–50)
MCH RBC QN AUTO: 31.2 PG (ref 27–31)
MCHC RBC AUTO-ENTMCNC: 33.7 G/DL (ref 30–37)
MCV RBC AUTO: 92.5 FL (ref 81–99)
METHADONE UR QL SCN: NEGATIVE
MONOCYTES # BLD AUTO: 0.61 10*3/MM3 (ref 0–0.9)
MONOCYTES NFR BLD AUTO: 9.7 % (ref 0–12)
NEUTROPHILS # BLD AUTO: 3.12 10*3/MM3 (ref 2–6.9)
NEUTROPHILS NFR BLD AUTO: 49.4 % (ref 37–80)
NITRITE UR QL STRIP: NEGATIVE
NRBC BLD MANUAL-RTO: 0 /100 WBC (ref 0–0)
OPIATES UR QL: NEGATIVE
OXYCODONE UR QL SCN: NEGATIVE
PCP UR QL SCN: NEGATIVE
PH UR STRIP.AUTO: 7 [PH] (ref 5–8)
PLATELET # BLD AUTO: 231 10*3/MM3 (ref 130–400)
PMV BLD AUTO: 10.5 FL (ref 6–12)
POTASSIUM BLD-SCNC: 3.7 MMOL/L (ref 3.5–5.1)
PROPOXYPH UR QL: NEGATIVE
PROT UR QL STRIP: NEGATIVE
RBC # BLD AUTO: 3.98 10*6/MM3 (ref 4.2–5.4)
RBC # UR: ABNORMAL /HPF
REF LAB TEST METHOD: ABNORMAL
SODIUM BLD-SCNC: 141 MMOL/L (ref 137–145)
SP GR UR STRIP: 1.01 (ref 1–1.03)
SQUAMOUS #/AREA URNS HPF: ABNORMAL /HPF
TRICYCLICS UR QL SCN: NEGATIVE
TROPONIN I SERPL-MCNC: <0.012 NG/ML (ref 0–0.03)
TSH SERPL DL<=0.05 MIU/L-ACNC: 2.08 MIU/ML (ref 0.47–4.68)
UROBILINOGEN UR QL STRIP: ABNORMAL
WBC NRBC COR # BLD: 6.31 10*3/MM3 (ref 4.8–10.8)
WBC UR QL AUTO: ABNORMAL /HPF
WHOLE BLOOD HOLD SPECIMEN: NORMAL
WHOLE BLOOD HOLD SPECIMEN: NORMAL

## 2017-02-17 PROCEDURE — 93005 ELECTROCARDIOGRAM TRACING: CPT

## 2017-02-17 PROCEDURE — 99283 EMERGENCY DEPT VISIT LOW MDM: CPT

## 2017-02-17 PROCEDURE — 80048 BASIC METABOLIC PNL TOTAL CA: CPT | Performed by: EMERGENCY MEDICINE

## 2017-02-17 PROCEDURE — 81001 URINALYSIS AUTO W/SCOPE: CPT | Performed by: EMERGENCY MEDICINE

## 2017-02-17 PROCEDURE — 84484 ASSAY OF TROPONIN QUANT: CPT | Performed by: EMERGENCY MEDICINE

## 2017-02-17 PROCEDURE — 96361 HYDRATE IV INFUSION ADD-ON: CPT

## 2017-02-17 PROCEDURE — 84443 ASSAY THYROID STIM HORMONE: CPT | Performed by: EMERGENCY MEDICINE

## 2017-02-17 PROCEDURE — 80306 DRUG TEST PRSMV INSTRMNT: CPT | Performed by: EMERGENCY MEDICINE

## 2017-02-17 PROCEDURE — 96360 HYDRATION IV INFUSION INIT: CPT

## 2017-02-17 PROCEDURE — 85025 COMPLETE CBC W/AUTO DIFF WBC: CPT | Performed by: EMERGENCY MEDICINE

## 2017-02-17 PROCEDURE — 82962 GLUCOSE BLOOD TEST: CPT

## 2017-02-17 PROCEDURE — 71010 HC CHEST PA OR AP: CPT

## 2017-02-17 RX ORDER — SODIUM CHLORIDE 0.9 % (FLUSH) 0.9 %
10 SYRINGE (ML) INJECTION AS NEEDED
Status: DISCONTINUED | OUTPATIENT
Start: 2017-02-17 | End: 2017-02-17 | Stop reason: HOSPADM

## 2017-02-17 RX ADMIN — SODIUM CHLORIDE 1000 ML: 9 INJECTION, SOLUTION INTRAVENOUS at 12:19

## 2017-02-17 NOTE — ED PROVIDER NOTES
Subjective   Patient is a 21 y.o. female presenting with palpitations.   History provided by:  Patient  History limited by: No limits.   used: No    Palpitations   Palpitations quality:  Regular  Onset quality:  Gradual  Duration:  2 weeks  Timing:  Intermittent  Progression:  Waxing and waning  Chronicity:  Recurrent  Context: not anxiety, not appetite suppressants, not blood loss, not bronchodilators, not caffeine, not dehydration, not exercise, not hyperventilation, not illicit drugs, not nicotine and not stimulant use    Relieved by:  Nothing  Worsened by:  Nothing  Ineffective treatments:  None tried  Associated symptoms: malaise/fatigue    Associated symptoms: no back pain, no chest pain, no chest pressure, no cough, no diaphoresis, no dizziness, no leg pain, no lower extremity edema, no nausea, no near-syncope, no numbness, no orthopnea, no shortness of breath, no syncope, no vomiting and no weakness    Associated symptoms comment:  Near syncope  Risk factors: no diabetes mellitus, no heart disease, no hx of atrial fibrillation, no hx of DVT, no hx of PE, no hx of thyroid disease, no hypercoagulable state, no hyperthyroidism, no OTC sinus medications and no stress        Review of Systems   Constitutional: Positive for malaise/fatigue. Negative for diaphoresis.   Respiratory: Negative for cough and shortness of breath.    Cardiovascular: Positive for palpitations. Negative for chest pain, orthopnea, syncope and near-syncope.   Gastrointestinal: Negative for nausea and vomiting.   Musculoskeletal: Negative for back pain.   Neurological: Negative for dizziness, weakness and numbness.   All other systems reviewed and are negative.      Past Medical History   Diagnosis Date   • Allergic    • Anemia    • Anxiety    • Arthritis 9/13/2016   • Chronic headaches 9/13/2016   • Collapse    • Depression    • Hematuria    • History of drug abuse      patient denies this   • Pneumonia 9/13/2016        Allergies   Allergen Reactions   • Penicillins Hives   • Iodine Rash   • Latex Rash       Past Surgical History   Procedure Laterality Date   • Tonsillectomy     • Adenoidectomy     • Cystoscopy     • Cystoscopy         Family History   Problem Relation Age of Onset   • Heart disease Mother    • Hypertension Mother    • Heart disease Father    • Hypertension Father    • Hyperlipidemia Father    • Liver disease Father    • Arthritis Maternal Grandmother    • Cancer Maternal Grandmother    • Kidney disease Maternal Grandmother    • Stroke Maternal Grandmother    • Arthritis Maternal Grandfather    • Cancer Maternal Grandfather    • Stroke Maternal Grandfather    • Cancer Paternal Grandmother    • Cancer Paternal Grandfather    • Stroke Paternal Grandfather        Social History     Social History   • Marital status: Single     Spouse name: N/A   • Number of children: N/A   • Years of education: N/A     Social History Main Topics   • Smoking status: Never Smoker   • Smokeless tobacco: Never Used   • Alcohol use Yes      Comment: seldom   • Drug use: No   • Sexual activity: Defer     Other Topics Concern   • None     Social History Narrative    Lives with her parents           Objective   Physical Exam   Constitutional: She is oriented to person, place, and time. She appears well-developed and well-nourished.   HENT:   Head: Normocephalic and atraumatic.   Right Ear: External ear normal.   Left Ear: External ear normal.   Nose: Nose normal.   Mouth/Throat: Oropharynx is clear and moist.   Eyes: Conjunctivae and EOM are normal. Pupils are equal, round, and reactive to light. Right eye exhibits no discharge. Left eye exhibits no discharge. No scleral icterus.   Neck: Normal range of motion. Neck supple. No JVD present. No tracheal deviation present. No thyromegaly present.   Cardiovascular: Normal rate, regular rhythm, normal heart sounds and intact distal pulses.    Pulmonary/Chest: Effort normal and breath sounds  normal. No stridor. No respiratory distress. She has no wheezes. She has no rales. She exhibits no tenderness.   Abdominal: Soft. Bowel sounds are normal. She exhibits no distension and no mass. There is no tenderness. There is no rebound and no guarding. No hernia.   Musculoskeletal: Normal range of motion. She exhibits no edema, tenderness or deformity.   Lymphadenopathy:     She has no cervical adenopathy.   Neurological: She is alert and oriented to person, place, and time. She has normal reflexes. She displays normal reflexes. No cranial nerve deficit. She exhibits normal muscle tone. Coordination normal.   Skin: Skin is warm and dry. No rash noted. No erythema. No pallor.   Psychiatric: She has a normal mood and affect. Her behavior is normal. Judgment and thought content normal.   Nursing note and vitals reviewed.      Procedures         ED Course  ED Course   Comment By Time   Discussed this patient with Dr. Becerril the EKG findings which showed this to tachycardia no acute abnormalities he suggested placing patient on a small dose of metoprolol for palpitations he has appointment with patient on Tuesday and will see her at that time.  Okayed released from the ER. Kashif Acevedo MD 02/17 1336                  MDM  Number of Diagnoses or Management Options     Amount and/or Complexity of Data Reviewed  Clinical lab tests: ordered and reviewed  Tests in the radiology section of CPT®: ordered and reviewed  Tests in the medicine section of CPT®: ordered and reviewed  Discussion of test results with the performing providers: yes  Decide to obtain previous medical records or to obtain history from someone other than the patient: yes  Obtain history from someone other than the patient: yes  Review and summarize past medical records: yes  Discuss the patient with other providers: yes  Independent visualization of images, tracings, or specimens: yes    Risk of Complications, Morbidity, and/or  Mortality  Presenting problems: high  Diagnostic procedures: high  Management options: high    Critical Care  Total time providing critical care: < 30 minutes    Patient Progress  Patient progress: improved      Final diagnoses:   Palpitations            Kashif Acevedo MD  02/17/17 0079       Kashif Acevedo MD  02/17/17 0827

## 2017-02-17 NOTE — TELEPHONE ENCOUNTER
----- Message from Vani Serrano sent at 2/17/2017  2:26 PM EST -----  Contact: PATIENT  PATIENT CALLED STATING SHE HAS AN APPOINTMENT ON MARCH 8TH BUT SHE WAS RECENTLY IN THE ER. PATIENT STATES SHE WOULD LIKE TO SPEAK WITH ONLY DR. ALEGRIA TO SEE IF HE WANTS HER TO COME IN EARLIER. PATIENT DID NOT WANT ME TO MAKE HER AN APPOINTMENT AND STATED SHE WANTED TO SPEAK WITH DR. ALEGRIA FIRST. PATIENTS PHONE NUMBER -[phone]

## 2017-02-21 ENCOUNTER — CONSULT (OUTPATIENT)
Dept: CARDIOLOGY | Facility: CLINIC | Age: 22
End: 2017-02-21

## 2017-02-21 VITALS
HEART RATE: 115 BPM | RESPIRATION RATE: 18 BRPM | HEIGHT: 68 IN | WEIGHT: 122.8 LBS | OXYGEN SATURATION: 99 % | SYSTOLIC BLOOD PRESSURE: 100 MMHG | DIASTOLIC BLOOD PRESSURE: 58 MMHG | BODY MASS INDEX: 18.61 KG/M2

## 2017-02-21 DIAGNOSIS — R06.02 SHORTNESS OF BREATH: ICD-10-CM

## 2017-02-21 DIAGNOSIS — R00.2 PALPITATIONS: ICD-10-CM

## 2017-02-21 DIAGNOSIS — R07.2 PRECORDIAL PAIN: Primary | ICD-10-CM

## 2017-02-21 DIAGNOSIS — R55 VASOVAGAL SYNCOPE: ICD-10-CM

## 2017-02-21 PROCEDURE — 99204 OFFICE O/P NEW MOD 45 MIN: CPT | Performed by: INTERNAL MEDICINE

## 2017-02-21 RX ORDER — MIDODRINE HYDROCHLORIDE 5 MG/1
5 TABLET ORAL 3 TIMES DAILY
Qty: 90 TABLET | Refills: 6 | Status: SHIPPED | OUTPATIENT
Start: 2017-02-21 | End: 2018-05-01 | Stop reason: SDUPTHER

## 2017-02-21 NOTE — PROGRESS NOTES
"    Subjective:     Encounter Date:02/21/2017      Patient ID: Elen Cleveland is a 21 y.o. female.    Chief Complaint: Chest pain shortness of breath dizziness palpitations and syncope.  HPI  This is a 21-year-old female patient who reports having palpitations for the last several years.  The patient indicates that over the last one month this has been occurring daily.  She reports that her heart tends to \"beat out of my chest\".  She further describes this as a sense that her heart is racing and pounding.  On the times that she is taking her pulse or heart rates are typically around 120-130 bpm.  She cannot identify any precipitating aggravating or alleviating features.  The frequency duration and intensity of her palpitations has escalated over the last one month.  The patient indicates that this is been occurring on a daily basis and will occasionally occur multiple times per day.  She also describes a very uncomfortable sensation where she will develop a sense that her heart has paused followed by a forceful \"thump\".  It is this symptomatology that often predates her syncope.  The patient's 12-lead electrocardiogram is normal.  She has had 5-6 previous 12-lead EKGs all of which are normal.  The patient also indicates that she gets severely short of breath climbing less than one flight of stairs.  She can get short of breath walking short distances on flat ground.  She indicates that she was severely short of breath walking from her car to the clinic.  She indicates that she get short of breath with each and every physical activity.  The patient also reports having chest discomfort which she describes as a retrosternal aching pain.  This discomfort usually occurs after her episodes of palpitations and will tend to linger for one to 2 hours after the palpitations have ceased.  There is no exertional component.  There is no radiation.  There is no associated shortness of breath nausea or diaphoresis.  The " discomfort is worse with a deep breath and seems to get better if she rubs her sternum.  The patient indicates that for the last 2 months she has had multiple episodes of syncope.  She indicates having 3 episodes of syncope this month.  The most recent was this weekend while she was walking into a [employer].  She reports that there is a prodrome of feeling cold and shivering.  She also then feels flushed before losing consciousness.  She will often times experience palpitations before losing consciousness.  She was previously diagnosed with absence seizures however a follow-up EEG showed no evidence of seizure activity.  During her syncopal episode she has had no witnessed tonic-clonic seizure activity.  After the event there is no profound fatigue or confusion.  She has had no loss of bowel or bladder control during these episodes.  She indicates that she is typically unconscious for less than 1 minute.  She has been having syncopal episodes for several years.  They have increased in frequency duration and intensity over the last 2 months.  She has no orthopnea or PND.  She has no personal history of hypertension diabetes or hypercholesterolemia.  She is a lifelong nonsmoker.  She denies using illicit substances.  Her family history is negative for premature coronary disease.  Her family doctor thought that he heard a cardiac murmur as well as an abnormal splitting of the second heart sound.  The following portions of the patient's history were reviewed and updated as appropriate: allergies, current medications, past family history, past medical history, past social history, past surgical history and problem  Review of Systems   Constitution: Negative for chills, diaphoresis, fever, weakness, malaise/fatigue, weight gain and weight loss.   HENT: Negative for ear discharge, hearing loss, hoarse voice and nosebleeds.    Eyes: Negative for discharge, double vision, pain and photophobia.   Cardiovascular: Positive for  chest pain, dyspnea on exertion, irregular heartbeat and palpitations. Negative for claudication, cyanosis, leg swelling, near-syncope, orthopnea, paroxysmal nocturnal dyspnea and syncope.   Respiratory: Positive for shortness of breath. Negative for cough, hemoptysis, sputum production and wheezing.    Endocrine: Negative for cold intolerance, heat intolerance, polydipsia, polyphagia and polyuria.   Hematologic/Lymphatic: Negative for adenopathy and bleeding problem. Does not bruise/bleed easily.   Skin: Negative for color change, flushing, itching and rash.   Musculoskeletal: Negative for muscle cramps, muscle weakness, myalgias and stiffness.   Gastrointestinal: Negative for abdominal pain, diarrhea, hematemesis, hematochezia, nausea and vomiting.   Genitourinary: Negative for dysuria, frequency and nocturia.   Neurological: Positive for dizziness. Negative for focal weakness, loss of balance, numbness, paresthesias and seizures.   Psychiatric/Behavioral: Negative for altered mental status, hallucinations and suicidal ideas.   Allergic/Immunologic: Negative for HIV exposure, hives and persistent infections.       Current Outpatient Prescriptions:   •  citalopram (CeleXA) 20 MG tablet, Take 1 tablet by mouth Daily., Disp: 30 tablet, Rfl: 5  •  diphenhydrAMINE (BENADRYL) 25 mg capsule, Take 25 mg by mouth every 6 (six) hours as needed for itching., Disp: , Rfl:   •  metoprolol tartrate (LOPRESSOR) 25 MG tablet, Take 0.5 tablets by mouth 2 (Two) Times a Day., Disp: 15 tablet, Rfl: 6  •  midodrine (PROAMATINE) 5 MG tablet, Take 1 tablet by mouth 3 (Three) Times a Day., Disp: 90 tablet, Rfl: 6  •  Norethin Ace-Eth Estrad-FE (REJI FE 1/20 PO), Take  by mouth daily., Disp: , Rfl:   •  norethindrone-ethinyl estradiol (JUNEL FE 1/20) 1-20 MG-MCG per tablet, Take 1 tablet by mouth daily., Disp: 28 tablet, Rfl: 12     Objective:     Physical Exam   Constitutional: She is oriented to person, place, and time. She appears  "well-developed and well-nourished.   HENT:   Head: Normocephalic and atraumatic.   Mouth/Throat: Oropharynx is clear and moist.   Eyes: Conjunctivae and EOM are normal. Pupils are equal, round, and reactive to light. No scleral icterus.   Neck: Normal range of motion. Neck supple. No JVD present. No tracheal deviation present. No thyromegaly present.   Cardiovascular: Normal rate, regular rhythm, S1 normal, S2 normal, normal heart sounds, intact distal pulses and normal pulses.  PMI is not displaced.  Exam reveals no gallop and no friction rub.    No murmur heard.  Pulmonary/Chest: Effort normal and breath sounds normal. No respiratory distress. She has no wheezes. She has no rales.   Abdominal: Soft. Bowel sounds are normal. She exhibits no distension and no mass. There is no tenderness. There is no rebound and no guarding.   Musculoskeletal: Normal range of motion. She exhibits no edema or deformity.   Neurological: She is alert and oriented to person, place, and time. She displays normal reflexes. No cranial nerve deficit. Coordination normal.   Skin: Skin is warm and dry. No rash noted. No erythema.   Psychiatric: She has a normal mood and affect. Her behavior is normal. Thought content normal.     Blood pressure 100/58, pulse 115, resp. rate 18, height 68\" (172.7 cm), weight 122 lb 12.8 oz (55.7 kg), SpO2 99 %.   Lab Review:       Assessment:         1. Precordial pain  Her chest discomfort is mostly atypical for coronary insufficiency.  - Adult Transthoracic Echo Complete  - Treadmill Stress Test    2. Vasovagal syncope  Her syncopal episode seems suspicious for neurocardiogenic syncope.  - Holter Monitor - 48 Hour  - Adult Transthoracic Echo Complete    3. Shortness of breath  An obvious etiology for her shortness of breath is not present at this time.  - Adult Transthoracic Echo Complete  - Treadmill Stress Test    4. Palpitations  Arrhythmia and/or ectopy sounds plausible.  - Holter Monitor - 48 Hour  - " Adult Transthoracic Echo Complete  Procedures     Plan:       I have recommended a 48 hour Holter monitor, an echocardiogram and a treadmill exercise stress test.  I have recommended increasing her fluid intake.  She is cautioned to avoid caffeinated beverages.  She is cautioned to avoid food and beverages that have a diuretic properties such as green tea.  She is instructed to greatly increase her dietary sodium intake.  We have started midodrine 5 mg 3 times per day.  After one week of this medication plus fluid and sodium escalation she will start Lopressor 12.5 mg twice a day.  Further recommendations will be predicated on the outcome of her outpatient testing.  She has been cautioned that she should not operate a vehicle until she has been at least 6 months without a syncopal episode.

## 2017-03-06 LAB
BH CV ECHO MEAS - % IVS THICK: 42.9 %
BH CV ECHO MEAS - % LVPW THICK: 33.3 %
BH CV ECHO MEAS - AO MAX PG (FULL): 3.3 MMHG
BH CV ECHO MEAS - AO MAX PG: 7.3 MMHG
BH CV ECHO MEAS - AO MEAN PG (FULL): 2 MMHG
BH CV ECHO MEAS - AO MEAN PG: 4 MMHG
BH CV ECHO MEAS - AO ROOT AREA (BSA CORRECTED): 1.1
BH CV ECHO MEAS - AO ROOT AREA: 2.8 CM^2
BH CV ECHO MEAS - AO ROOT DIAM: 1.9 CM
BH CV ECHO MEAS - AO V2 MAX: 134.5 CM/SEC
BH CV ECHO MEAS - AO V2 MEAN: 97.1 CM/SEC
BH CV ECHO MEAS - AO V2 VTI: 26.7 CM
BH CV ECHO MEAS - AVA(I,A): 2.2 CM^2
BH CV ECHO MEAS - AVA(I,D): 2.2 CM^2
BH CV ECHO MEAS - AVA(V,A): 2.3 CM^2
BH CV ECHO MEAS - AVA(V,D): 2.3 CM^2
BH CV ECHO MEAS - BSA(HAYCOCK): 1.6 M^2
BH CV ECHO MEAS - BSA: 1.7 M^2
BH CV ECHO MEAS - BZI_BMI: 18.6 KILOGRAMS/M^2
BH CV ECHO MEAS - BZI_METRIC_HEIGHT: 172.7 CM
BH CV ECHO MEAS - BZI_METRIC_WEIGHT: 55.3 KG
BH CV ECHO MEAS - EDV(CUBED): 74.1 ML
BH CV ECHO MEAS - EDV(TEICH): 78.6 ML
BH CV ECHO MEAS - EF(CUBED): 71.9 %
BH CV ECHO MEAS - EF(TEICH): 64 %
BH CV ECHO MEAS - ESV(CUBED): 20.8 ML
BH CV ECHO MEAS - ESV(TEICH): 28.3 ML
BH CV ECHO MEAS - FS: 34.5 %
BH CV ECHO MEAS - IVS/LVPW: 1.2
BH CV ECHO MEAS - IVSD: 0.7 CM
BH CV ECHO MEAS - IVSS: 1 CM
BH CV ECHO MEAS - LA DIMENSION: 2.6 CM
BH CV ECHO MEAS - LA/AO: 1.4
BH CV ECHO MEAS - LV MASS(C)D: 77.4 GRAMS
BH CV ECHO MEAS - LV MASS(C)DI: 46.7 GRAMS/M^2
BH CV ECHO MEAS - LV MASS(C)S: 61.7 GRAMS
BH CV ECHO MEAS - LV MASS(C)SI: 37.2 GRAMS/M^2
BH CV ECHO MEAS - LV MAX PG: 4 MMHG
BH CV ECHO MEAS - LV MEAN PG: 2 MMHG
BH CV ECHO MEAS - LV V1 MAX: 99.5 CM/SEC
BH CV ECHO MEAS - LV V1 MEAN: 72.3 CM/SEC
BH CV ECHO MEAS - LV V1 VTI: 19 CM
BH CV ECHO MEAS - LVIDD: 4.2 CM
BH CV ECHO MEAS - LVIDS: 2.8 CM
BH CV ECHO MEAS - LVOT AREA (M): 3.1 CM^2
BH CV ECHO MEAS - LVOT AREA: 3.1 CM^2
BH CV ECHO MEAS - LVOT DIAM: 2 CM
BH CV ECHO MEAS - LVPWD: 0.6 CM
BH CV ECHO MEAS - LVPWS: 0.8 CM
BH CV ECHO MEAS - MV A MAX VEL: 63.9 CM/SEC
BH CV ECHO MEAS - MV DEC SLOPE: 642 CM/SEC^2
BH CV ECHO MEAS - MV E MAX VEL: 121 CM/SEC
BH CV ECHO MEAS - MV E/A: 1.9
BH CV ECHO MEAS - MV P1/2T MAX VEL: 118 CM/SEC
BH CV ECHO MEAS - MV P1/2T: 53.8 MSEC
BH CV ECHO MEAS - MVA P1/2T LCG: 1.9 CM^2
BH CV ECHO MEAS - MVA(P1/2T): 4.1 CM^2
BH CV ECHO MEAS - PA MAX PG: 3.4 MMHG
BH CV ECHO MEAS - PA V2 MAX: 92.1 CM/SEC
BH CV ECHO MEAS - RAP SYSTOLE: 10 MMHG
BH CV ECHO MEAS - RVSP: 21 MMHG
BH CV ECHO MEAS - SI(AO): 45.7 ML/M^2
BH CV ECHO MEAS - SI(CUBED): 32.2 ML/M^2
BH CV ECHO MEAS - SI(LVOT): 36 ML/M^2
BH CV ECHO MEAS - SI(TEICH): 30.4 ML/M^2
BH CV ECHO MEAS - SV(AO): 75.7 ML
BH CV ECHO MEAS - SV(CUBED): 53.3 ML
BH CV ECHO MEAS - SV(LVOT): 59.7 ML
BH CV ECHO MEAS - SV(TEICH): 50.3 ML
BH CV ECHO MEAS - TR MAX VEL: 166 CM/SEC
BH CV ECHO MEAS - TV MAX PG: 2.8 MMHG
BH CV ECHO MEAS - TV V2 MAX: 83.2 CM/SEC
BH CV STRESS DURATION MIN STAGE 1: 3
BH CV STRESS DURATION SEC STAGE 1: 0
BH CV STRESS GRADE STAGE 1: 10
BH CV STRESS METS STAGE 1: 5
BH CV STRESS PROTOCOL 1: NORMAL
BH CV STRESS RECOVERY BP: NORMAL MMHG
BH CV STRESS RECOVERY HR: 90 BPM
BH CV STRESS RECOVERY O2: 97 %
BH CV STRESS SPEED STAGE 1: 1.7
BH CV STRESS STAGE 1: 1
LV EF 2D ECHO EST: 52 %
MAXIMAL PREDICTED HEART RATE: 199 BPM
PERCENT MAX PREDICTED HR: 97.99 %
STRESS BASELINE BP: NORMAL MMHG
STRESS BASELINE HR: 76 BPM
STRESS O2 SAT REST: 100 %
STRESS PERCENT HR: 115 %
STRESS POST ESTIMATED WORKLOAD: 10.1 METS
STRESS POST EXERCISE DUR MIN: 9 MIN
STRESS POST EXERCISE DUR SEC: 30 SEC
STRESS POST O2 SAT PEAK: 98 %
STRESS POST PEAK BP: NORMAL MMHG
STRESS POST PEAK HR: 195 BPM
STRESS TARGET HR: 169 BPM

## 2017-03-24 ENCOUNTER — LAB (OUTPATIENT)
Dept: LAB | Facility: HOSPITAL | Age: 22
End: 2017-03-24
Attending: EMERGENCY MEDICINE

## 2017-03-24 ENCOUNTER — TRANSCRIBE ORDERS (OUTPATIENT)
Dept: ADMINISTRATIVE | Facility: HOSPITAL | Age: 22
End: 2017-03-24

## 2017-03-24 DIAGNOSIS — Z77.21 CONTACT WITH POTENTIALLY HAZARDOUS BODY FLUIDS: Primary | ICD-10-CM

## 2017-03-24 LAB — RPR SER QL: NORMAL

## 2017-03-24 PROCEDURE — 87389 HIV-1 AG W/HIV-1&-2 AB AG IA: CPT | Performed by: EMERGENCY MEDICINE

## 2017-03-24 PROCEDURE — 36415 COLL VENOUS BLD VENIPUNCTURE: CPT | Performed by: EMERGENCY MEDICINE

## 2017-03-24 PROCEDURE — 80074 ACUTE HEPATITIS PANEL: CPT | Performed by: EMERGENCY MEDICINE

## 2017-03-24 PROCEDURE — 86592 SYPHILIS TEST NON-TREP QUAL: CPT | Performed by: EMERGENCY MEDICINE

## 2017-03-25 LAB
HAV IGM SERPL QL IA: NEGATIVE
HBV CORE IGM SERPL QL IA: NEGATIVE
HBV SURFACE AG SERPL QL IA: NEGATIVE
HCV AB S/CO SERPL IA: 0.1 S/CO RATIO (ref 0–0.9)
HIV 1+2 AB+HIV1 P24 AG SERPL QL IA: NON REACTIVE

## 2017-04-20 ENCOUNTER — HOSPITAL ENCOUNTER (EMERGENCY)
Facility: HOSPITAL | Age: 22
Discharge: HOME | End: 2017-04-20
Attending: EMERGENCY MEDICINE | Admitting: EMERGENCY MEDICINE

## 2017-04-20 VITALS
TEMPERATURE: 97.9 F | SYSTOLIC BLOOD PRESSURE: 92 MMHG | HEART RATE: 84 BPM | OXYGEN SATURATION: 95 % | HEIGHT: 68 IN | BODY MASS INDEX: 18.19 KG/M2 | DIASTOLIC BLOOD PRESSURE: 58 MMHG | RESPIRATION RATE: 16 BRPM | WEIGHT: 120 LBS

## 2017-04-20 DIAGNOSIS — E87.6 HYPOKALEMIA: ICD-10-CM

## 2017-04-20 DIAGNOSIS — I95.1 ORTHOSTATIC HYPOTENSION: ICD-10-CM

## 2017-04-20 DIAGNOSIS — F10.920 ALCOHOL INTOXICATION, UNCOMPLICATED (HCC): Primary | ICD-10-CM

## 2017-04-20 DIAGNOSIS — R00.0 TACHYCARDIA: ICD-10-CM

## 2017-04-20 LAB
ALBUMIN SERPL-MCNC: 3.9 G/DL (ref 3.5–5)
ALBUMIN/GLOB SERPL: 1.6 G/DL (ref 1–2)
ALP SERPL-CCNC: 59 U/L (ref 38–126)
ALT SERPL W P-5'-P-CCNC: 16 U/L (ref 13–69)
ANION GAP SERPL CALCULATED.3IONS-SCNC: 14.7 MMOL/L
AST SERPL-CCNC: 17 U/L (ref 15–46)
BASOPHILS # BLD AUTO: 0.02 10*3/MM3 (ref 0–0.2)
BASOPHILS NFR BLD AUTO: 0.3 % (ref 0–2.5)
BILIRUB SERPL-MCNC: 0.3 MG/DL (ref 0.2–1.3)
BUN BLD-MCNC: 8 MG/DL (ref 7–20)
BUN/CREAT SERPL: 13.3 (ref 7.1–23.5)
CALCIUM SPEC-SCNC: 8.5 MG/DL (ref 8.4–10.2)
CHLORIDE SERPL-SCNC: 107 MMOL/L (ref 98–107)
CO2 SERPL-SCNC: 24 MMOL/L (ref 26–30)
CREAT BLD-MCNC: 0.6 MG/DL (ref 0.6–1.3)
DEPRECATED RDW RBC AUTO: 36.1 FL (ref 37–54)
EOSINOPHIL # BLD AUTO: 0.07 10*3/MM3 (ref 0–0.7)
EOSINOPHIL NFR BLD AUTO: 1.2 % (ref 0–7)
ERYTHROCYTE [DISTWIDTH] IN BLOOD BY AUTOMATED COUNT: 11.1 % (ref 11.5–14.5)
ETHANOL BLD-MCNC: 171 MG/DL
ETHANOL UR QL: 0.17 %
GFR SERPL CREATININE-BSD FRML MDRD: 125 ML/MIN/1.73
GLOBULIN UR ELPH-MCNC: 2.5 GM/DL
GLUCOSE BLD-MCNC: 107 MG/DL (ref 74–98)
HCG SERPL QL: NEGATIVE
HCT VFR BLD AUTO: 34 % (ref 37–47)
HGB BLD-MCNC: 11.5 G/DL (ref 12–16)
HOLD SPECIMEN: NORMAL
HOLD SPECIMEN: NORMAL
IMM GRANULOCYTES # BLD: 0.01 10*3/MM3 (ref 0–0.06)
IMM GRANULOCYTES NFR BLD: 0.2 % (ref 0–0.6)
LYMPHOCYTES # BLD AUTO: 2.29 10*3/MM3 (ref 0.6–3.4)
LYMPHOCYTES NFR BLD AUTO: 38.4 % (ref 10–50)
MCH RBC QN AUTO: 30.7 PG (ref 27–31)
MCHC RBC AUTO-ENTMCNC: 33.8 G/DL (ref 30–37)
MCV RBC AUTO: 90.7 FL (ref 81–99)
MONOCYTES # BLD AUTO: 0.5 10*3/MM3 (ref 0–0.9)
MONOCYTES NFR BLD AUTO: 8.4 % (ref 0–12)
NEUTROPHILS # BLD AUTO: 3.08 10*3/MM3 (ref 2–6.9)
NEUTROPHILS NFR BLD AUTO: 51.5 % (ref 37–80)
NRBC BLD MANUAL-RTO: 0 /100 WBC (ref 0–0)
PLATELET # BLD AUTO: 165 10*3/MM3 (ref 130–400)
PMV BLD AUTO: 10.1 FL (ref 6–12)
POTASSIUM BLD-SCNC: 2.7 MMOL/L (ref 3.5–5.1)
PROT SERPL-MCNC: 6.4 G/DL (ref 6.3–8.2)
RBC # BLD AUTO: 3.75 10*6/MM3 (ref 4.2–5.4)
SODIUM BLD-SCNC: 143 MMOL/L (ref 137–145)
WBC NRBC COR # BLD: 5.97 10*3/MM3 (ref 4.8–10.8)
WHOLE BLOOD HOLD SPECIMEN: NORMAL
WHOLE BLOOD HOLD SPECIMEN: NORMAL

## 2017-04-20 PROCEDURE — 99284 EMERGENCY DEPT VISIT MOD MDM: CPT

## 2017-04-20 PROCEDURE — 96374 THER/PROPH/DIAG INJ IV PUSH: CPT

## 2017-04-20 PROCEDURE — 85025 COMPLETE CBC W/AUTO DIFF WBC: CPT | Performed by: INTERNAL MEDICINE

## 2017-04-20 PROCEDURE — 25010000002 ONDANSETRON PER 1 MG: Performed by: EMERGENCY MEDICINE

## 2017-04-20 PROCEDURE — 80053 COMPREHEN METABOLIC PANEL: CPT | Performed by: INTERNAL MEDICINE

## 2017-04-20 PROCEDURE — 84703 CHORIONIC GONADOTROPIN ASSAY: CPT | Performed by: EMERGENCY MEDICINE

## 2017-04-20 PROCEDURE — 80307 DRUG TEST PRSMV CHEM ANLYZR: CPT

## 2017-04-20 RX ORDER — POTASSIUM CHLORIDE 750 MG/1
40 CAPSULE, EXTENDED RELEASE ORAL ONCE
Status: COMPLETED | OUTPATIENT
Start: 2017-04-20 | End: 2017-04-20

## 2017-04-20 RX ORDER — SODIUM CHLORIDE 0.9 % (FLUSH) 0.9 %
10 SYRINGE (ML) INJECTION AS NEEDED
Status: DISCONTINUED | OUTPATIENT
Start: 2017-04-20 | End: 2017-04-20 | Stop reason: HOSPADM

## 2017-04-20 RX ORDER — ONDANSETRON 2 MG/ML
4 INJECTION INTRAMUSCULAR; INTRAVENOUS ONCE
Status: COMPLETED | OUTPATIENT
Start: 2017-04-20 | End: 2017-04-20

## 2017-04-20 RX ADMIN — POTASSIUM CHLORIDE 40 MEQ: 750 CAPSULE, EXTENDED RELEASE ORAL at 05:27

## 2017-04-20 RX ADMIN — ONDANSETRON 4 MG: 2 INJECTION INTRAMUSCULAR; INTRAVENOUS at 05:53

## 2017-04-20 NOTE — ED PROVIDER NOTES
Subjective   HPI Comments: 22-year-old female presenting with alcohol intoxication.  She is brought in by a friend who provides history.  Prior to arrival patient a large amount of whiskey to drink.  She became unresponsive and passed out in the parking lot, did not fall.  However large amount of vomitus.  He masses call Wilda brought to the hospital.  She is still quite intoxicated and will not cooperate with exam or history.      Review of Systems   Unable to perform ROS: Mental status change       Past Medical History:   Diagnosis Date   • Allergic    • Anemia    • Anxiety    • Chest pain    • Chronic headaches 9/13/2016   • Collapse    • Depression    • Heart murmur    • Hematuria    • Pneumonia 9/13/2016       Allergies   Allergen Reactions   • Penicillins Hives   • Iodine Rash   • Latex Rash       Past Surgical History:   Procedure Laterality Date   • ADENOIDECTOMY     • CYSTOSCOPY     • CYSTOSCOPY     • MOUTH SURGERY      wisdom teeth   • TONSILLECTOMY         Family History   Problem Relation Age of Onset   • Heart disease Mother    • Hypertension Mother    • Hyperlipidemia Mother    • Heart disease Father    • Hypertension Father    • Hyperlipidemia Father    • Liver disease Father    • Heart attack Father    • Arthritis Maternal Grandmother    • Cancer Maternal Grandmother    • Kidney disease Maternal Grandmother    • Stroke Maternal Grandmother    • Cancer Paternal Grandmother    • Cancer Paternal Grandfather    • Stroke Paternal Grandfather    • Arthritis Paternal Grandfather    • Diabetes Paternal Grandfather    • Heart attack Paternal Grandfather        Social History     Social History   • Marital status: Single     Spouse name: N/A   • Number of children: N/A   • Years of education: N/A     Social History Main Topics   • Smoking status: Never Smoker   • Smokeless tobacco: Never Used   • Alcohol use Yes      Comment: seldom   • Drug use: No   • Sexual activity: Defer     Other Topics Concern   • None      Social History Narrative    Lives with her parents           Objective   Physical Exam   Constitutional: She appears well-developed and well-nourished. No distress.   Obviously intoxicated   HENT:   Head: Normocephalic and atraumatic.   Right Ear: External ear normal.   Left Ear: External ear normal.   Nose: Nose normal.   Mouth/Throat: Oropharynx is clear and moist.   Eyes: Conjunctivae and EOM are normal. Pupils are equal, round, and reactive to light.   Neck: Normal range of motion. Neck supple.   Cardiovascular: Normal rate, regular rhythm, normal heart sounds and intact distal pulses.    Pulmonary/Chest: Effort normal and breath sounds normal. No respiratory distress.   Abdominal: Soft. Bowel sounds are normal. She exhibits no distension. There is no tenderness. There is no rebound and no guarding.   Musculoskeletal: Normal range of motion. She exhibits no edema, tenderness or deformity.   Neurological: She is alert.   Slurred speech, moves all extremities   Skin: Skin is warm and dry. No rash noted.   Psychiatric: She has a normal mood and affect. Her behavior is normal.   Nursing note and vitals reviewed.      Procedures         ED Course  ED Course                  MDM  Number of Diagnoses or Management Options  Alcohol intoxication, uncomplicated:   Hypokalemia:   Diagnosis management comments: 22-year-old female with intoxication.  Well-developed, well-nourished female in no distress with normal vital signs and otherwise nonfocal exam.  We will monitor here for clinical sobriety.  No indication for any other workup or treatment.    Ddx: alcohol intoxication    Per triage protocol patient had labs checked.  Her alcohol level is elevated.  Also noted is hypokalemia.  Given her a dose of by mouth replacement.  She is awake and alert now.  She is ambulated around the department.  Feel she is safe for discharge home at this time.       Amount and/or Complexity of Data Reviewed  Decide to obtain previous  medical records or to obtain history from someone other than the patient: yes        Final diagnoses:   Alcohol intoxication, uncomplicated   Hypokalemia            Bentley Wilkins MD  04/20/17 [id number]

## 2017-05-19 ENCOUNTER — TELEPHONE (OUTPATIENT)
Dept: NEUROLOGY | Facility: CLINIC | Age: 22
End: 2017-05-19

## 2018-04-23 ENCOUNTER — TELEPHONE (OUTPATIENT)
Dept: INTERNAL MEDICINE | Facility: CLINIC | Age: 23
End: 2018-04-23

## 2018-04-23 NOTE — TELEPHONE ENCOUNTER
PT SAID SHE WENT TO THE ER ON FRI FOR SYNCOPE. SHE BELIEVES IT WAS B/C SHE WAS OUT OF THE METOPROLOL.     SHE NEEDS THIS THUR IN THE Am.     PLEASE ADVISE.

## 2018-04-23 NOTE — TELEPHONE ENCOUNTER
I left a message for patient to call us back, in regards to scheduling an appointment,  the Metoprolol was previously  prescribed by Dr. Gonzalez and the chart shows that  she still has refills left   Sejal Tatum  (RN)  2017 03:11:55 Jess Aguila)  2017 05:40:59

## 2018-05-01 ENCOUNTER — OFFICE VISIT (OUTPATIENT)
Dept: INTERNAL MEDICINE | Facility: CLINIC | Age: 23
End: 2018-05-01

## 2018-05-01 VITALS
HEIGHT: 68 IN | HEART RATE: 99 BPM | BODY MASS INDEX: 20.46 KG/M2 | OXYGEN SATURATION: 99 % | DIASTOLIC BLOOD PRESSURE: 54 MMHG | SYSTOLIC BLOOD PRESSURE: 98 MMHG | WEIGHT: 135 LBS | TEMPERATURE: 98.4 F

## 2018-05-01 DIAGNOSIS — R55 VASOVAGAL SYNCOPE: ICD-10-CM

## 2018-05-01 DIAGNOSIS — Z00.00 ANNUAL PHYSICAL EXAM: Primary | ICD-10-CM

## 2018-05-01 DIAGNOSIS — R00.2 PALPITATIONS: ICD-10-CM

## 2018-05-01 DIAGNOSIS — D64.9 ANEMIA, UNSPECIFIED TYPE: ICD-10-CM

## 2018-05-01 DIAGNOSIS — K52.9 POSTPRANDIAL DIARRHEA: ICD-10-CM

## 2018-05-01 DIAGNOSIS — R10.821 RIGHT UPPER QUADRANT ABDOMINAL TENDERNESS WITH REBOUND TENDERNESS: ICD-10-CM

## 2018-05-01 PROCEDURE — 99395 PREV VISIT EST AGE 18-39: CPT | Performed by: PHYSICIAN ASSISTANT

## 2018-05-01 RX ORDER — MIDODRINE HYDROCHLORIDE 5 MG/1
5 TABLET ORAL 3 TIMES DAILY
Qty: 90 TABLET | Refills: 5 | Status: SHIPPED | OUTPATIENT
Start: 2018-05-01 | End: 2018-12-27

## 2018-05-01 RX ORDER — CITALOPRAM 20 MG/1
20 TABLET ORAL DAILY
Qty: 30 TABLET | Refills: 5 | Status: SHIPPED | OUTPATIENT
Start: 2018-05-01 | End: 2018-05-31 | Stop reason: SDUPTHER

## 2018-05-01 RX ORDER — FEXOFENADINE HCL AND PSEUDOEPHEDRINE HCI 180; 240 MG/1; MG/1
1 TABLET, EXTENDED RELEASE ORAL DAILY
COMMUNITY
End: 2019-02-14

## 2018-05-01 NOTE — PROGRESS NOTES
Subjective   Elen Cleveland is a 23 y.o. female and is here for a comprehensive physical exam. The patient reports problems - syncopal episodes.    She had a syncopal episode while at work on  while sitting and talking to someone which lead to an ED visit at [facility].   EKG showed PVCs. She also had an Echo which she was told was normal except for slight left ventricular enlargement which she was previously aware of due to Echo during high risk pregnancy last year.  She admits that syncopal events occur at least once per week. She admits to postprandial diarrhea worsening gradually over the last few months and nausea intermittently.     Postpartum depression- Celexa was initiated 1 week postpartum (mid-January) by her OB and they have requested we take over priscribing. She no longer feels she has depression but admits that she does feel anxious often due to a custody velez over her son.     Prior to pregnancy she was on metoprolol and midodrine which was discontinued when she became pregnant. She does feel the combination of medications was helping her syncopal episodes.      Hemoglobin was 11 when last checked and she required a blood transfusion after delivery.         Do you take any herbs or supplements that were not prescribed by a doctor? no  Are you taking calcium supplements? No  Are you taking aspirin daily? No       History:  LMP: No LMP recorded.  Menopause: No  Last pap date: 17  Abnormal pap? unsure         OB History    Para Term  AB Living   1     1   SAB TAB Ectopic Molar Multiple Live Births        1      # Outcome Date GA Lbr Chris/2nd Weight Sex Delivery Anes PTL Lv   1                  Sexual activity questions deferred to the physician.        The following portions of the patient's history were reviewed and updated as appropriate: She  has a past medical history of Allergic; Anemia; Anxiety; Chest pain; Chronic headaches (2016);  Collapse; Depression; Heart murmur; Hematuria; and Pneumonia (2016).  She  does not have any pertinent problems on file.  She  has a past surgical history that includes Tonsillectomy; Adenoidectomy; Cystoscopy; Cystoscopy; Mouth surgery; and  section.  Her family history includes Arthritis in her maternal grandmother and paternal grandfather; Cancer in her maternal grandmother, paternal grandfather, and paternal grandmother; Diabetes in her paternal grandfather; Heart attack in her father and paternal grandfather; Heart disease in her father and mother; Hyperlipidemia in her father and mother; Hypertension in her father and mother; Kidney disease in her maternal grandmother; Liver disease in her father; Stroke in her maternal grandmother and paternal grandfather.  She  reports that she has never smoked. She has never used smokeless tobacco. She reports that she drinks alcohol. She reports that she does not use drugs.  Current Outpatient Prescriptions   Medication Sig Dispense Refill   • fexofenadine-pseudoephedrine (ALLEGRA-D 24) 180-240 MG per 24 hr tablet Take 1 tablet by mouth Daily.     • IBUPROFEN IB PO Take  by mouth.     • norelgestromin-ethinyl estradiol (XULANE) 150-35 MCG/24HR Place 1 patch on the skin 1 (One) Time Per Week.     • citalopram (CeleXA) 20 MG tablet Take 1 tablet by mouth Daily. 30 tablet 5   • metoprolol tartrate (LOPRESSOR) 25 MG tablet Take 0.5 tablets by mouth Every 12 (Twelve) Hours. 15 tablet 5   • midodrine (PROAMATINE) 5 MG tablet Take 1 tablet by mouth 3 (Three) Times a Day. 90 tablet 5     No current facility-administered medications for this visit.    .    Review of Systems  Do you have pain that bothers you in your daily life? no    Review of Systems   Constitutional: Positive for fatigue. Negative for activity change, appetite change, chills, diaphoresis, fever and unexpected weight change.   HENT: Negative for congestion, drooling, ear pain, hearing loss,  "nosebleeds, postnasal drip, rhinorrhea, sinus pressure, sore throat, tinnitus and trouble swallowing.    Eyes: Negative for photophobia, discharge and visual disturbance.   Respiratory: Negative for cough, chest tightness, shortness of breath and wheezing.    Cardiovascular: Negative for chest pain, palpitations and leg swelling.   Gastrointestinal: Positive for diarrhea and nausea. Negative for abdominal distention, abdominal pain, blood in stool, constipation and vomiting.   Endocrine: Negative for cold intolerance, heat intolerance, polydipsia, polyphagia and polyuria.   Genitourinary: Negative.    Musculoskeletal: Negative for arthralgias, back pain, joint swelling, myalgias, neck pain and neck stiffness.   Skin: Negative for color change, pallor, rash and wound.   Allergic/Immunologic: Negative for environmental allergies, food allergies and immunocompromised state.   Neurological: Positive for syncope. Negative for dizziness, tremors, seizures, weakness, numbness and headaches.   Hematological: Negative for adenopathy. Does not bruise/bleed easily.   Psychiatric/Behavioral: Positive for dysphoric mood (stable). Negative for agitation, behavioral problems, confusion, hallucinations, self-injury, sleep disturbance and suicidal ideas. The patient is nervous/anxious.          Objective   BP 98/54   Pulse 99   Temp 98.4 °F (36.9 °C)   Ht 172.7 cm (67.99\")   Wt 61.2 kg (135 lb)   SpO2 99%   BMI 20.53 kg/m²     Physical Exam   Constitutional: She is oriented to person, place, and time. She appears well-developed and well-nourished. No distress.   HENT:   Head: Normocephalic and atraumatic.   Right Ear: External ear normal.   Left Ear: External ear normal.   Nose: Nose normal.   Mouth/Throat: Oropharynx is clear and moist.   Eyes: EOM are normal. Pupils are equal, round, and reactive to light. No scleral icterus.   Neck: Normal range of motion. Neck supple. No thyromegaly present.   Cardiovascular: Normal rate, " regular rhythm and normal heart sounds.  Exam reveals no gallop and no friction rub.    No murmur heard.  Pulmonary/Chest: Effort normal and breath sounds normal. No respiratory distress. She has no rales. She exhibits no tenderness.   Abdominal: Soft. Bowel sounds are normal. She exhibits no distension and no mass. There is tenderness (RUQ). No hernia.   Musculoskeletal: Normal range of motion. She exhibits no edema, tenderness or deformity.   Lymphadenopathy:     She has no cervical adenopathy.   Neurological: She is alert and oriented to person, place, and time. No cranial nerve deficit. She exhibits normal muscle tone.   Skin: Skin is warm and dry. Capillary refill takes less than 2 seconds. No rash noted. She is not diaphoretic.   Psychiatric: She has a normal mood and affect. Her behavior is normal. Judgment and thought content normal.   Nursing note and vitals reviewed.         Assessment/Plan   Healthy female exam.     1.    Diagnosis Plan   1. Annual physical exam     2. BMI 20.0-20.9, adult  Patient's Body mass index is 20.53 kg/m². BMI is within normal parameters. No follow-up required.     3. Postpartum depression  citalopram (CeleXA) 20 MG tablet  Continue Celexa at 20 mg daily dose.     TSH    T4    T3, Free    Thyroid Peroxidase Antibody     4. Vasovagal syncope  CBC & Differential    Comprehensive Metabolic Panel    TSH    T4    T3, Free    Thyroid Peroxidase Antibody    Resume: midodrine (PROAMATINE) 5 MG tablet tid    Resume 1/2 tablet twice daily after 3 days of midodrine alone: metoprolol tartrate (LOPRESSOR) 25 MG tablet     5. Right upper quadrant abdominal tenderness with rebound tenderness  US Gallbladder    CBC & Differential    Comprehensive Metabolic Panel    Lipase    Amylase    H. Pylori Breath Test - Breath, Lung     6. Postprandial diarrhea  US Gallbladder    CBC & Differential    Comprehensive Metabolic Panel    Lipase    Amylase    H. Pylori Breath Test - Breath, Lung     7.  Palpitations  TSH    T4    T3, Free    Thyroid Peroxidase Antibody    midodrine (PROAMATINE) 5 MG tablet    metoprolol tartrate (LOPRESSOR) 25 MG tablet     8. Anemia, unspecified type  CBC & Differential     Reviewed labs and EKG from [facility] record.    2. Patient Counseling:  --Nutrition: Stressed importance of moderation in sodium/caffeine intake, saturated fat and cholesterol, caloric balance, sufficient intake of fresh fruits, vegetables, fiber, calcium, iron, and 1 g folate supplementation if of childbearing age.   --Discussed the issue of calcium supplement, and the daily use of baby aspirin if applicable.  --Exercise: Stressed the importance of regular exercise.   --Substance Abuse: Discussed cessation/primary prevention of tobacco (if applicable), alcohol, or other drug use (if applicable); driving or other dangerous activities under the influence; availability of treatment for abuse.    --Sexuality: Discussed sexually transmitted diseases, partner selection, use of condoms, avoidance of unintended pregnancy  and contraceptive alternatives.   --Injury prevention: Discussed safety belts, safety helmets, smoke detector, smoking near bedding or upholstery.   --Dental health: Discussed importance of regular tooth brushing, flossing, and dental visits.  --Immunizations reviewed.  --Discussed benefits of screening colonoscopy (if applicable).  --After hours service discussed with patient.    3. Discussed the patient's BMI with her.  The BMI is in the acceptable range  4. Return in about 1 month (around 6/1/2018) for Next scheduled follow up, PAP.

## 2018-05-05 LAB
ALBUMIN SERPL-MCNC: 4.6 G/DL (ref 3.5–5.5)
ALBUMIN/GLOB SERPL: 1.7 {RATIO} (ref 1.2–2.2)
ALP SERPL-CCNC: 107 IU/L (ref 39–117)
ALT SERPL-CCNC: 13 IU/L (ref 0–32)
AMYLASE SERPL-CCNC: 66 U/L (ref 31–124)
AST SERPL-CCNC: 17 IU/L (ref 0–40)
BASOPHILS # BLD AUTO: 0 X10E3/UL (ref 0–0.2)
BASOPHILS NFR BLD AUTO: 0 %
BILIRUB SERPL-MCNC: 0.2 MG/DL (ref 0–1.2)
BUN SERPL-MCNC: 12 MG/DL (ref 6–20)
BUN/CREAT SERPL: 19 (ref 9–23)
CALCIUM SERPL-MCNC: 9.2 MG/DL (ref 8.7–10.2)
CHLORIDE SERPL-SCNC: 101 MMOL/L (ref 96–106)
CO2 SERPL-SCNC: 23 MMOL/L (ref 18–29)
CREAT SERPL-MCNC: 0.63 MG/DL (ref 0.57–1)
EOSINOPHIL # BLD AUTO: 0.2 X10E3/UL (ref 0–0.4)
EOSINOPHIL NFR BLD AUTO: 2 %
ERYTHROCYTE [DISTWIDTH] IN BLOOD BY AUTOMATED COUNT: 13.4 % (ref 12.3–15.4)
GFR SERPLBLD CREATININE-BSD FMLA CKD-EPI: 127 ML/MIN/1.73
GFR SERPLBLD CREATININE-BSD FMLA CKD-EPI: 146 ML/MIN/1.73
GLOBULIN SER CALC-MCNC: 2.7 G/DL (ref 1.5–4.5)
GLUCOSE SERPL-MCNC: 79 MG/DL (ref 65–99)
HCT VFR BLD AUTO: 37.3 % (ref 34–46.6)
HGB BLD-MCNC: 11.5 G/DL (ref 11.1–15.9)
IMM GRANULOCYTES # BLD: 0 X10E3/UL (ref 0–0.1)
IMM GRANULOCYTES NFR BLD: 0 %
LIPASE SERPL-CCNC: 31 U/L (ref 14–72)
LYMPHOCYTES # BLD AUTO: 1.8 X10E3/UL (ref 0.7–3.1)
LYMPHOCYTES NFR BLD AUTO: 20 %
MCH RBC QN AUTO: 25.2 PG (ref 26.6–33)
MCHC RBC AUTO-ENTMCNC: 30.8 G/DL (ref 31.5–35.7)
MCV RBC AUTO: 82 FL (ref 79–97)
MONOCYTES # BLD AUTO: 0.6 X10E3/UL (ref 0.1–0.9)
MONOCYTES NFR BLD AUTO: 7 %
NEUTROPHILS # BLD AUTO: 6.8 X10E3/UL (ref 1.4–7)
NEUTROPHILS NFR BLD AUTO: 71 %
PLATELET # BLD AUTO: 290 X10E3/UL (ref 150–379)
POTASSIUM SERPL-SCNC: 4.3 MMOL/L (ref 3.5–5.2)
PROT SERPL-MCNC: 7.3 G/DL (ref 6–8.5)
RBC # BLD AUTO: 4.57 X10E6/UL (ref 3.77–5.28)
SODIUM SERPL-SCNC: 141 MMOL/L (ref 134–144)
T3FREE SERPL-MCNC: 3.7 PG/ML (ref 2–4.4)
T4 SERPL-MCNC: 9.3 UG/DL (ref 4.5–12)
THYROPEROXIDASE AB SERPL-ACNC: 23 IU/ML (ref 0–34)
TSH SERPL DL<=0.005 MIU/L-ACNC: 0.63 UIU/ML (ref 0.45–4.5)
UREA BREATH TEST QL: NEGATIVE
UREA BREATH TEST QL: NORMAL
WBC # BLD AUTO: 9.4 X10E3/UL (ref 3.4–10.8)

## 2018-05-10 ENCOUNTER — TELEPHONE (OUTPATIENT)
Dept: INTERNAL MEDICINE | Facility: CLINIC | Age: 23
End: 2018-05-10

## 2018-05-11 NOTE — TELEPHONE ENCOUNTER
Please let her know that I reviewed the labs from the ER. Ask her to stop in to  [facility] stool tests to evaluate for any GI blood loss to look in to her anemia.

## 2018-05-15 ENCOUNTER — CLINICAL SUPPORT (OUTPATIENT)
Dept: INTERNAL MEDICINE | Facility: CLINIC | Age: 23
End: 2018-05-15

## 2018-05-15 ENCOUNTER — TELEPHONE (OUTPATIENT)
Dept: INTERNAL MEDICINE | Facility: CLINIC | Age: 23
End: 2018-05-15

## 2018-05-15 DIAGNOSIS — Z00.00 HEALTHCARE MAINTENANCE: Primary | ICD-10-CM

## 2018-05-15 PROCEDURE — 82270 OCCULT BLOOD FECES: CPT | Performed by: INTERNAL MEDICINE

## 2018-05-17 LAB
EXPIRATION DATE 2: NORMAL
EXPIRATION DATE 3: NORMAL
EXPIRATION DATE: NORMAL
GASTROCULT GAST QL: NEGATIVE
HEMOCCULT SP2 STL QL: NEGATIVE
HEMOCCULT SP3 STL QL: NEGATIVE
Lab: NORMAL

## 2018-05-31 DIAGNOSIS — R55 VASOVAGAL SYNCOPE: ICD-10-CM

## 2018-05-31 DIAGNOSIS — R00.2 PALPITATIONS: ICD-10-CM

## 2018-05-31 RX ORDER — CITALOPRAM 20 MG/1
20 TABLET ORAL DAILY
Qty: 90 TABLET | Refills: 1 | Status: SHIPPED | OUTPATIENT
Start: 2018-05-31 | End: 2019-01-09 | Stop reason: ALTCHOICE

## 2018-06-01 ENCOUNTER — APPOINTMENT (OUTPATIENT)
Dept: ULTRASOUND IMAGING | Facility: HOSPITAL | Age: 23
End: 2018-06-01

## 2018-12-27 ENCOUNTER — OFFICE VISIT (OUTPATIENT)
Dept: INTERNAL MEDICINE | Facility: CLINIC | Age: 23
End: 2018-12-27

## 2018-12-27 VITALS
OXYGEN SATURATION: 98 % | SYSTOLIC BLOOD PRESSURE: 100 MMHG | HEIGHT: 68 IN | DIASTOLIC BLOOD PRESSURE: 66 MMHG | TEMPERATURE: 98.8 F | WEIGHT: 136.8 LBS | BODY MASS INDEX: 20.73 KG/M2 | RESPIRATION RATE: 16 BRPM | HEART RATE: 93 BPM

## 2018-12-27 DIAGNOSIS — M54.42 ACUTE LEFT-SIDED LOW BACK PAIN WITH LEFT-SIDED SCIATICA: ICD-10-CM

## 2018-12-27 DIAGNOSIS — H10.31 ACUTE BACTERIAL CONJUNCTIVITIS OF RIGHT EYE: Primary | ICD-10-CM

## 2018-12-27 PROCEDURE — 99214 OFFICE O/P EST MOD 30 MIN: CPT | Performed by: PHYSICIAN ASSISTANT

## 2018-12-27 RX ORDER — CYCLOBENZAPRINE HCL 5 MG
5 TABLET ORAL NIGHTLY PRN
Qty: 20 TABLET | Refills: 0 | OUTPATIENT
Start: 2018-12-27 | End: 2019-02-14

## 2018-12-27 RX ORDER — CIPROFLOXACIN HYDROCHLORIDE 3.5 MG/ML
1 SOLUTION/ DROPS TOPICAL EVERY 4 HOURS
Qty: 1 EACH | Refills: 0 | Status: SHIPPED | OUTPATIENT
Start: 2018-12-27 | End: 2019-01-23

## 2018-12-27 RX ORDER — IBUPROFEN 600 MG/1
600 TABLET ORAL EVERY 8 HOURS PRN
Qty: 42 TABLET | Refills: 0 | Status: SHIPPED | OUTPATIENT
Start: 2018-12-27 | End: 2019-01-09 | Stop reason: ALTCHOICE

## 2018-12-27 NOTE — PROGRESS NOTES
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 Child currently has pink eye.        Subjective   Elen Cleveland is a 23 y.o. 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 She was discharged from the  due to lower back pain with radiation down the left leg.  She has history of intermittent low back pain.  She has been followed by chiropractor who had not mentioned scoliosis.  While she was at training, they diagnosed her with scoliosis.  She used ibuprofen and icy hot.  She feels that basic training may have aggravated an existing back problem.  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  Her work is requiring her to have clearance to return.  She is continuing to have left sided radiation and low back pain.  She reports that the pain is intense.  She denies any loss of bowel or bladder function.  She does however mention that the pain is so intense at times she feels like she may.  Denies fever, shortness of breath, chest pain.     Eye drainage:  She woke up this morning with right eye drainage.  Her son who is 11 months, is being treated for pink eye.  She wears contact lenses.  She has thrown them away.  She had thick yellow discharge from the eye.  She continues to have eye redness.  Denies vision changes.      Past Medical History:   Diagnosis Date   • Allergic    • Anemia    • Anxiety    • Chest pain    • Chronic headaches 2016   • Collapse    • Depression    • Heart murmur    • Hematuria    • Pneumonia 2016   • Scoliosis      Past Surgical History:   Procedure Laterality Date   • ADENOIDECTOMY     •  SECTION     • CYSTOSCOPY     • CYSTOSCOPY     • MOUTH  SURGERY      wisdom teeth   • TONSILLECTOMY       Family History   Problem Relation Age of Onset   • Heart disease Mother    • Hypertension Mother    • Hyperlipidemia Mother    • Heart disease Father    • Hypertension Father    • Hyperlipidemia Father    • Liver disease Father    • Heart attack Father    • Arthritis Maternal Grandmother    • Cancer Maternal Grandmother    • Kidney disease Maternal Grandmother    • Stroke Maternal Grandmother    • Cancer Paternal Grandmother    • Cancer Paternal Grandfather    • Stroke Paternal Grandfather    • Arthritis Paternal Grandfather    • Diabetes Paternal Grandfather    • Heart attack Paternal Grandfather      Social History     Socioeconomic History   • Marital status: Single     Spouse name: Not on file   • Number of children: Not on file   • Years of education: Not on file   • Highest education level: Not on file   Social Needs   • Financial resource strain: Not on file   • Food insecurity - worry: Not on file   • Food insecurity - inability: Not on file   • Transportation needs - medical: Not on file   • Transportation needs - non-medical: Not on file   Occupational History   • Not on file   Tobacco Use   • Smoking status: Never Smoker   • Smokeless tobacco: Never Used   Substance and Sexual Activity   • Alcohol use: No     Frequency: Never   • Drug use: No   • Sexual activity: Defer   Other Topics Concern   • Not on file   Social History Narrative    Lives with her parents     Allergies   Allergen Reactions   • Penicillins Hives   • Iodine Rash   • Latex Rash         Review of Systems   Constitutional: Negative.  Negative for chills, fatigue and fever.   HENT: Negative.    Eyes: Positive for discharge and redness. Negative for blurred vision and double vision.   Respiratory: Negative.  Negative for shortness of breath and wheezing.    Cardiovascular: Negative for chest pain and palpitations.   Gastrointestinal: Negative.    Genitourinary: Negative for urinary  incontinence.   Musculoskeletal: Positive for back pain. Negative for arthralgias, gait problem and myalgias.   Neurological: Negative for dizziness, tremors, weakness and numbness.     Objective     Vitals:    12/27/18 1418   BP: 100/66   Pulse: 93   Resp: 16   Temp: 98.8 °F (37.1 °C)   SpO2: 98%       Physical Exam   Constitutional: She is oriented to person, place, and time. She appears well-developed and well-nourished. No distress.   HENT:   Head: Normocephalic and atraumatic.   Eyes: EOM are normal. Pupils are equal, round, and reactive to light. Right eye exhibits discharge. Right conjunctiva is injected.   Neck: Neck supple.   Cardiovascular: Normal rate, regular rhythm and normal heart sounds. Exam reveals no gallop and no friction rub.   No murmur heard.  Pulmonary/Chest: Effort normal and breath sounds normal. No respiratory distress. She has no wheezes. She has no rales. She exhibits no tenderness.   Abdominal: Soft. Bowel sounds are normal. She exhibits no distension. There is no tenderness. There is no rebound and no guarding.   Musculoskeletal:        Lumbar back: She exhibits tenderness and spasm. She exhibits normal range of motion, no bony tenderness, no swelling and no deformity.   Thoracic back shows curvature.  There is no tenderness spinous or para spinous.      Patient reports radiation down the posterior portion of the left leg, wrapping laterally to the knee.     Neurological: She is alert and oriented to person, place, and time. She has normal strength. She displays no atrophy. She exhibits normal muscle tone.   Skin: Skin is warm and dry. Capillary refill takes less than 2 seconds. She is not diaphoretic.   Psychiatric: She has a normal mood and affect. Her behavior is normal.   Nursing note and vitals reviewed.    Assessment/Plan     Elen was seen today for scoliosis and eye drainage.    Diagnoses and all orders for this visit:    Acute bacterial conjunctivitis of right eye  -     Discontinue use of contacts while infection is present.  Begin using ciprofloxacin drops as directed.  Warm compresses to the eye to relieve discomfort.    -    ciprofloxacin (CILOXAN) 0.3 % ophthalmic solution; Apply 1 drop to eye(s) as directed by provider Every 4 (Four) Hours.    Acute left-sided low back pain with left-sided sciatica  -     Have placed referral to physical therapy.  Low back pain with radiation is consistent with sciatica.  Have instructed patient to use Ibuprofen, warm/cold compresses, biofreeze, stretches, and flexeril as needed.  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  Will re-evaluate after PT and use of anti-inflammatories.  She has scheduled follow up with Dr. Langston for Early January.    -     Ambulatory Referral to Physical Therapy  -     ibuprofen (ADVIL,MOTRIN) 600 MG tablet; Take 1 tablet by mouth Every 8 (Eight) Hours As Needed for Mild Pain .  -     cyclobenzaprine (FLEXERIL) 5 MG tablet; Take 1 tablet by mouth At Night As Needed for Muscle Spasms.    Return for Recheck.    Vani Soto PA-C

## 2019-01-03 ENCOUNTER — TELEPHONE (OUTPATIENT)
Dept: INTERNAL MEDICINE | Facility: CLINIC | Age: 24
End: 2019-01-03

## 2019-01-03 RX ORDER — OFLOXACIN 3 MG/ML
1 SOLUTION/ DROPS OPHTHALMIC 4 TIMES DAILY
Qty: 10 ML | Refills: 0 | Status: SHIPPED | OUTPATIENT
Start: 2019-01-03 | End: 2019-01-23

## 2019-01-03 NOTE — TELEPHONE ENCOUNTER
Can you call patient and see if her eye symptoms improved?  We received a message stating that the drops that were ordered were on national back order?

## 2019-01-03 NOTE — TELEPHONE ENCOUNTER
Pharm states Cipro eye drop is on national back order and wanted to see if you wanted to do Ofloxacin or Moxifloxacin drops instead?    Please advise

## 2019-01-03 NOTE — TELEPHONE ENCOUNTER
Wow that rx was sent on Dec 26..  Please call pt and see if she is worse better or the same.  New rx sent if not better.  Let me know.

## 2019-01-04 NOTE — TELEPHONE ENCOUNTER
Pt states that her pink eye is better, so no longer needs the drops. She did state that on 12/29/18 she was admitted to [facility] due to her back pain and had scans done that showed 5 herniated discs. She is going to be seeing Dr. Langston on Monday and will follow up with this. I will pull records from [facility].

## 2019-01-09 ENCOUNTER — OFFICE VISIT (OUTPATIENT)
Dept: INTERNAL MEDICINE | Facility: CLINIC | Age: 24
End: 2019-01-09

## 2019-01-09 VITALS
WEIGHT: 136 LBS | OXYGEN SATURATION: 100 % | TEMPERATURE: 98.3 F | SYSTOLIC BLOOD PRESSURE: 100 MMHG | RESPIRATION RATE: 16 BRPM | BODY MASS INDEX: 20.61 KG/M2 | HEIGHT: 68 IN | HEART RATE: 93 BPM | DIASTOLIC BLOOD PRESSURE: 62 MMHG

## 2019-01-09 DIAGNOSIS — F41.9 ANXIETY: ICD-10-CM

## 2019-01-09 DIAGNOSIS — M51.16 LUMBAR DISC PROLAPSE WITH COMPRESSION RADICULOPATHY: Primary | ICD-10-CM

## 2019-01-09 DIAGNOSIS — R53.83 FATIGUE, UNSPECIFIED TYPE: ICD-10-CM

## 2019-01-09 PROCEDURE — 99214 OFFICE O/P EST MOD 30 MIN: CPT | Performed by: INTERNAL MEDICINE

## 2019-01-09 RX ORDER — HYDROCODONE BITARTRATE AND ACETAMINOPHEN 5; 325 MG/1; MG/1
TABLET ORAL AS NEEDED
COMMUNITY
Start: 2018-12-31 | End: 2019-02-14

## 2019-01-09 RX ORDER — DIAZEPAM 5 MG/1
TABLET ORAL AS NEEDED
COMMUNITY
Start: 2018-12-31 | End: 2019-02-14

## 2019-01-09 RX ORDER — METHYLPREDNISOLONE 4 MG/1
TABLET ORAL
Qty: 21 EACH | Refills: 0 | Status: SHIPPED | OUTPATIENT
Start: 2019-01-09 | End: 2019-01-23

## 2019-01-09 RX ORDER — CITALOPRAM 20 MG/1
20 TABLET ORAL DAILY
Qty: 30 TABLET | Refills: 3 | Status: SHIPPED | OUTPATIENT
Start: 2019-01-09 | End: 2019-04-25

## 2019-01-09 NOTE — PROGRESS NOTES
Subjective     Patient ID: Elen Cleveland is a 23 y.o. female. Patient is here for management of multiple medical problems.     Chief Complaint   Patient presents with   • Physical Therapy referral     patient needs a referral to physical therapy for back pain, patient was in the ER at  on 12/29/18, diagnosed with a herniated disc      History of Present Illness     Pt seen by  ortho for lumbar spine disc herniation.  Didn't better with medrol.   Not yet doing PT.   Has to go back to work on Sat.  Pt has apt with Ortho on 1/14.          The following portions of the patient's history were reviewed and updated as appropriate: allergies, current medications, past family history, past medical history, past social history, past surgical history and problem list.    Review of Systems   Constitutional: Positive for fatigue.   Musculoskeletal: Positive for back pain and gait problem.   Neurological: Positive for weakness.   All other systems reviewed and are negative.      Current Outpatient Medications:   •  citalopram (CeleXA) 20 MG tablet, Take 1 tablet by mouth Daily., Disp: 30 tablet, Rfl: 3  •  cyclobenzaprine (FLEXERIL) 5 MG tablet, Take 1 tablet by mouth At Night As Needed for Muscle Spasms., Disp: 20 tablet, Rfl: 0  •  norelgestromin-ethinyl estradiol (XULANE) 150-35 MCG/24HR, Place 1 patch on the skin 1 (One) Time Per Week., Disp: , Rfl:   •  ofloxacin (OCUFLOX) 0.3 % ophthalmic solution, Apply 1 drop to eye(s) as directed by provider 4 (Four) Times a Day., Disp: 10 mL, Rfl: 0  •  ciprofloxacin (CILOXAN) 0.3 % ophthalmic solution, Apply 1 drop to eye(s) as directed by provider Every 4 (Four) Hours., Disp: 1 each, Rfl: 0  •  diazePAM (VALIUM) 5 MG tablet, , Disp: , Rfl:   •  fexofenadine-pseudoephedrine (ALLEGRA-D 24) 180-240 MG per 24 hr tablet, Take 1 tablet by mouth Daily., Disp: , Rfl:   •  HYDROcodone-acetaminophen (NORCO) 5-325 MG per tablet, , Disp: , Rfl:   •  MethylPREDNISolone (MEDROL, LAURENT,) 4  "MG tablet, Take as directed on package instructions., Disp: 21 each, Rfl: 0  •  NAPROXEN  MG EC tablet, , Disp: , Rfl:     Objective      Blood pressure 100/62, pulse 93, temperature 98.3 °F (36.8 °C), temperature source Oral, resp. rate 16, height 172.7 cm (68\"), weight 61.7 kg (136 lb), last menstrual period 12/16/2018, SpO2 100 %.    Physical Exam     General Appearance:    Alert, cooperative, no distress, appears stated age   Head:    Normocephalic, without obvious abnormality, atraumatic   Eyes:    PERRL, conjunctiva/corneas clear, EOM's intact   Ears:    Normal TM's and external ear canals, both ears   Nose:   Nares normal, septum midline, mucosa normal, no drainage   or sinus tenderness   Throat:   Lips, mucosa, and tongue normal; teeth and gums normal   Neck:   Supple, symmetrical, trachea midline, no adenopathy;        thyroid:  No enlargement/tenderness/nodules; no carotid    bruit or JVD   Back:     Symmetric, no curvature, ROM normal, no CVA tenderness   Lungs:     Clear to auscultation bilaterally, respirations unlabored   Chest wall:    No tenderness or deformity   Heart:    Regular rate and rhythm, S1 and S2 normal, no murmur,        rub or gallop   Abdomen:     Soft, non-tender, bowel sounds active all four quadrants,     no masses, no organomegaly   Extremities:   Extremities normal, atraumatic, no cyanosis or edema   Pulses:   2+ and symmetric all extremities   Skin:   Skin color, texture, turgor normal, no rashes or lesions   Lymph nodes:   Cervical, supraclavicular, and axillary nodes normal   Neurologic:   CNII-XII intact. Normal strength, sensation and reflexes       throughout      Results for orders placed or performed in visit on 05/15/18   POCT Occult blood x 3, stool   Result Value Ref Range    Occult Blood Negative Negative    Expiration Date 8/31/18     Lot Number 1 [id number]     Occult Blood 2 Negative Negative    Expiration Date 2 8/31/18     Lot Number 2 [id number]     Occult Blood 3 " Negative Negative    Expiration Date 3 8/31/18     Lot Number 3 [id number]          Assessment/Plan   Pt will need excuse from work.    Pt with urinary rentention.  No incontinence.  Had incontence originally with injury.      Pt to get tens unit.             Elen was seen today for physical therapy referral.    Diagnoses and all orders for this visit:    Lumbar disc prolapse with compression radiculopathy  -     Ambulatory Referral to Physical Therapy Evaluate and treat  -     MethylPREDNISolone (MEDROL, LAURENT,) 4 MG tablet; Take as directed on package instructions.  -     Lipid Panel  -     CBC & Differential  -     Vitamin B12  -     Comprehensive Metabolic Panel  -     TSH  -     T4, Free    Anxiety  -     citalopram (CeleXA) 20 MG tablet; Take 1 tablet by mouth Daily.  -     Lipid Panel  -     CBC & Differential  -     Vitamin B12  -     Comprehensive Metabolic Panel  -     TSH  -     T4, Free    Fatigue, unspecified type  -     Lipid Panel  -     CBC & Differential  -     Vitamin B12  -     Comprehensive Metabolic Panel  -     TSH  -     T4, Free      Return in about 2 weeks (around 1/23/2019), or if symptoms worsen or fail to improve.          There are no Patient Instructions on file for this visit.     Bentley Langston MD    Assessment/Plan

## 2019-01-14 ENCOUNTER — TELEPHONE (OUTPATIENT)
Dept: INTERNAL MEDICINE | Facility: CLINIC | Age: 24
End: 2019-01-14

## 2019-01-14 DIAGNOSIS — M54.16 LUMBAR RADICULOPATHY: ICD-10-CM

## 2019-01-14 DIAGNOSIS — M51.26 LUMBAR DISC HERNIATION: Primary | ICD-10-CM

## 2019-01-23 ENCOUNTER — OFFICE VISIT (OUTPATIENT)
Dept: INTERNAL MEDICINE | Facility: CLINIC | Age: 24
End: 2019-01-23

## 2019-01-23 VITALS
WEIGHT: 136 LBS | OXYGEN SATURATION: 98 % | DIASTOLIC BLOOD PRESSURE: 62 MMHG | SYSTOLIC BLOOD PRESSURE: 99 MMHG | HEIGHT: 68 IN | HEART RATE: 94 BPM | RESPIRATION RATE: 16 BRPM | BODY MASS INDEX: 20.61 KG/M2

## 2019-01-23 DIAGNOSIS — M51.26 LUMBAR DISC HERNIATION: Primary | ICD-10-CM

## 2019-01-23 LAB
ALBUMIN SERPL-MCNC: 4.5 G/DL (ref 3.5–5)
ALBUMIN/GLOB SERPL: 1.8 G/DL (ref 1–2)
ALP SERPL-CCNC: 77 U/L (ref 38–126)
ALT SERPL-CCNC: 24 U/L (ref 13–69)
AST SERPL-CCNC: 16 U/L (ref 15–46)
BASOPHILS # BLD AUTO: 0.03 10*3/MM3 (ref 0–0.2)
BASOPHILS NFR BLD AUTO: 0.5 % (ref 0–2.5)
BILIRUB SERPL-MCNC: 0.6 MG/DL (ref 0.2–1.3)
BUN SERPL-MCNC: 14 MG/DL (ref 7–20)
BUN/CREAT SERPL: 20 (ref 7.1–23.5)
CALCIUM SERPL-MCNC: 9.1 MG/DL (ref 8.4–10.2)
CHLORIDE SERPL-SCNC: 106 MMOL/L (ref 98–107)
CHOLEST SERPL-MCNC: 160 MG/DL (ref 0–199)
CO2 SERPL-SCNC: 24 MMOL/L (ref 26–30)
CREAT SERPL-MCNC: 0.7 MG/DL (ref 0.6–1.3)
EOSINOPHIL # BLD AUTO: 0.1 10*3/MM3 (ref 0–0.7)
EOSINOPHIL NFR BLD AUTO: 1.6 % (ref 0–7)
ERYTHROCYTE [DISTWIDTH] IN BLOOD BY AUTOMATED COUNT: 14.8 % (ref 11.5–14.5)
GLOBULIN SER CALC-MCNC: 2.5 GM/DL
GLUCOSE SERPL-MCNC: 82 MG/DL (ref 74–98)
HCT VFR BLD AUTO: 37.6 % (ref 37–47)
HDLC SERPL-MCNC: 39 MG/DL (ref 40–60)
HGB BLD-MCNC: 11.5 G/DL (ref 12–16)
IMM GRANULOCYTES # BLD AUTO: 0.02 10*3/MM3 (ref 0–0.06)
IMM GRANULOCYTES NFR BLD AUTO: 0.3 % (ref 0–0.6)
LDLC SERPL CALC-MCNC: 102 MG/DL (ref 0–99)
LYMPHOCYTES # BLD AUTO: 2.08 10*3/MM3 (ref 0.6–3.4)
LYMPHOCYTES NFR BLD AUTO: 33.6 % (ref 10–50)
MCH RBC QN AUTO: 24.8 PG (ref 27–31)
MCHC RBC AUTO-ENTMCNC: 30.6 G/DL (ref 30–37)
MCV RBC AUTO: 81 FL (ref 81–99)
MONOCYTES # BLD AUTO: 0.45 10*3/MM3 (ref 0–0.9)
MONOCYTES NFR BLD AUTO: 7.3 % (ref 0–12)
NEUTROPHILS # BLD AUTO: 3.51 10*3/MM3 (ref 2–6.9)
NEUTROPHILS NFR BLD AUTO: 56.7 % (ref 37–80)
NRBC BLD AUTO-RTO: 0 /100 WBC (ref 0–0)
PLATELET # BLD AUTO: 324 10*3/MM3 (ref 130–400)
POTASSIUM SERPL-SCNC: 4.2 MMOL/L (ref 3.5–5.1)
PROT SERPL-MCNC: 7 G/DL (ref 6.3–8.2)
RBC # BLD AUTO: 4.64 10*6/MM3 (ref 4.2–5.4)
SODIUM SERPL-SCNC: 139 MMOL/L (ref 137–145)
T4 FREE SERPL-MCNC: 1.09 NG/DL (ref 0.78–2.19)
TRIGL SERPL-MCNC: 96 MG/DL
TSH SERPL DL<=0.005 MIU/L-ACNC: 0.73 MIU/ML (ref 0.47–4.68)
VIT B12 SERPL-MCNC: 238 PG/ML (ref 239–931)
VLDLC SERPL CALC-MCNC: 19.2 MG/DL
WBC # BLD AUTO: 6.19 10*3/MM3 (ref 4.8–10.8)

## 2019-01-23 PROCEDURE — 99213 OFFICE O/P EST LOW 20 MIN: CPT | Performed by: INTERNAL MEDICINE

## 2019-01-23 RX ORDER — METHYLPREDNISOLONE 4 MG/1
TABLET ORAL
Qty: 21 EACH | Refills: 0 | Status: SHIPPED | OUTPATIENT
Start: 2019-01-23 | End: 2019-02-12 | Stop reason: SDUPTHER

## 2019-01-23 RX ORDER — METHOCARBAMOL 500 MG/1
TABLET, FILM COATED ORAL
COMMUNITY
Start: 2019-01-14 | End: 2019-04-25

## 2019-01-23 NOTE — PROGRESS NOTES
"Subjective     Patient ID: Elen Cleveland is a 23 y.o. female. Patient is here for management of multiple medical problems.     Chief Complaint   Patient presents with   • Follow-up     referral to PT     History of Present Illness     pain eased up on medrol..    Pt went to PT.  No worse or better.   PT wasn't sure if PT was going to help based on pathology.          The following portions of the patient's history were reviewed and updated as appropriate: allergies, current medications, past family history, past medical history, past social history, past surgical history and problem list.    Review of Systems   Constitutional: Positive for fatigue.   Musculoskeletal: Positive for back pain, gait problem and joint swelling. Negative for arthralgias.   All other systems reviewed and are negative.      Current Outpatient Medications:   •  citalopram (CeleXA) 20 MG tablet, Take 1 tablet by mouth Daily., Disp: 30 tablet, Rfl: 3  •  cyclobenzaprine (FLEXERIL) 5 MG tablet, Take 1 tablet by mouth At Night As Needed for Muscle Spasms., Disp: 20 tablet, Rfl: 0  •  diazePAM (VALIUM) 5 MG tablet, As Needed., Disp: , Rfl:   •  fexofenadine-pseudoephedrine (ALLEGRA-D 24) 180-240 MG per 24 hr tablet, Take 1 tablet by mouth Daily., Disp: , Rfl:   •  HYDROcodone-acetaminophen (NORCO) 5-325 MG per tablet, As Needed., Disp: , Rfl:   •  NAPROXEN  MG EC tablet, , Disp: , Rfl:   •  norelgestromin-ethinyl estradiol (XULANE) 150-35 MCG/24HR, Place 1 patch on the skin 1 (One) Time Per Week., Disp: , Rfl:   •  methocarbamol (ROBAXIN) 500 MG tablet, , Disp: , Rfl:   •  MethylPREDNISolone (MEDROL, LAURENT,) 4 MG tablet, Take as directed on package instructions., Disp: 21 each, Rfl: 0    Objective      Blood pressure 99/62, pulse 94, resp. rate 16, height 172.7 cm (68\"), weight 61.7 kg (136 lb), SpO2 98 %.    Physical Exam     General Appearance:    Alert, cooperative, no distress, appears stated age   Head:    Normocephalic, without " obvious abnormality, atraumatic   Eyes:    PERRL, conjunctiva/corneas clear, EOM's intact   Ears:    Normal TM's and external ear canals, both ears   Nose:   Nares normal, septum midline, mucosa normal, no drainage   or sinus tenderness   Throat:   Lips, mucosa, and tongue normal; teeth and gums normal   Neck:   Supple, symmetrical, trachea midline, no adenopathy;        thyroid:  No enlargement/tenderness/nodules; no carotid    bruit or JVD   Back:     Symmetric, no curvature, ROM normal, no CVA tenderness   Lungs:     Clear to auscultation bilaterally, respirations unlabored   Chest wall:    No tenderness or deformity   Heart:    Regular rate and rhythm, S1 and S2 normal, no murmur,        rub or gallop   Abdomen:     Soft, non-tender, bowel sounds active all four quadrants,     no masses, no organomegaly   Extremities:   Extremities normal, atraumatic, no cyanosis or edema   Pulses:   2+ and symmetric all extremities   Skin:   Skin color, texture, turgor normal, no rashes or lesions   Lymph nodes:   Cervical, supraclavicular, and axillary nodes normal   Neurologic: + slr on left leg.  deminished touch in tip of great toe left foot. Decrease vibration in lateral left foot.         Results for orders placed or performed in visit on 05/15/18   POCT Occult blood x 3, stool   Result Value Ref Range    Occult Blood Negative Negative    Expiration Date 8/31/18     Lot Number 1 [id number]     Occult Blood 2 Negative Negative    Expiration Date 2 8/31/18     Lot Number 2 [id number]     Occult Blood 3 Negative Negative    Expiration Date 3 8/31/18     Lot Number [id number]          Assessment/Plan     Pt seen at spine clinic  by PA.  Told she had no radiologic problems and has a urology problem. No exam done Per Pt report.    Pt also reports that Pt also reported that PA stated the original ortho and Radiologist must have been looking at some one else imaging.     hold on imaging ..  Stay off work. Continue PT.   Consider re  imaging if worse.          Elen was seen today for follow-up.    Diagnoses and all orders for this visit:    Lumbar disc herniation    Other orders  -     MethylPREDNISolone (MEDROL, LAURENT,) 4 MG tablet; Take as directed on package instructions.      Return in about 4 weeks (around 2/20/2019).          There are no Patient Instructions on file for this visit.     Bentley Langston MD    Assessment/Plan

## 2019-02-11 DIAGNOSIS — F41.9 ANXIETY: ICD-10-CM

## 2019-02-11 RX ORDER — METHYLPREDNISOLONE 4 MG/1
TABLET ORAL
Qty: 21 EACH | Refills: 0 | OUTPATIENT
Start: 2019-02-11

## 2019-02-11 RX ORDER — CITALOPRAM 20 MG/1
20 TABLET ORAL DAILY
Qty: 30 TABLET | Refills: 3 | OUTPATIENT
Start: 2019-02-11

## 2019-02-12 ENCOUNTER — TELEPHONE (OUTPATIENT)
Dept: INTERNAL MEDICINE | Facility: CLINIC | Age: 24
End: 2019-02-12

## 2019-02-12 RX ORDER — METHYLPREDNISOLONE 4 MG/1
TABLET ORAL
Qty: 21 EACH | Refills: 0 | OUTPATIENT
Start: 2019-02-12 | End: 2019-02-22

## 2019-02-12 NOTE — TELEPHONE ENCOUNTER
Dr. Langston, I spoke with Elen this morning regarding her labs and she asked if you would refill another RX for a Medrol Dose dania, she states she is having a lot of pain.

## 2019-02-27 ENCOUNTER — OFFICE VISIT (OUTPATIENT)
Dept: INTERNAL MEDICINE | Facility: CLINIC | Age: 24
End: 2019-02-27

## 2019-02-27 VITALS
WEIGHT: 128.12 LBS | BODY MASS INDEX: 19.42 KG/M2 | OXYGEN SATURATION: 100 % | RESPIRATION RATE: 16 BRPM | HEIGHT: 68 IN | HEART RATE: 83 BPM | TEMPERATURE: 97.9 F | DIASTOLIC BLOOD PRESSURE: 67 MMHG | SYSTOLIC BLOOD PRESSURE: 103 MMHG

## 2019-02-27 DIAGNOSIS — R53.83 FATIGUE, UNSPECIFIED TYPE: ICD-10-CM

## 2019-02-27 DIAGNOSIS — E53.8 LOW VITAMIN B12 LEVEL: Primary | ICD-10-CM

## 2019-02-27 DIAGNOSIS — G89.29 CHRONIC BILATERAL LOW BACK PAIN WITHOUT SCIATICA: ICD-10-CM

## 2019-02-27 DIAGNOSIS — M54.50 CHRONIC BILATERAL LOW BACK PAIN WITHOUT SCIATICA: ICD-10-CM

## 2019-02-27 PROCEDURE — 99213 OFFICE O/P EST LOW 20 MIN: CPT | Performed by: INTERNAL MEDICINE

## 2019-02-27 RX ORDER — CHOLECALCIFEROL (VITAMIN D3) 125 MCG
1000 CAPSULE ORAL DAILY
COMMUNITY
End: 2020-10-01

## 2019-02-27 NOTE — PROGRESS NOTES
Subjective     Patient ID: Elen Cleveland is a 23 y.o. female. Patient is here for management of multiple medical problems.     Chief Complaint   Patient presents with   • Vitamin B12 deficiency     folow-up on labs    • work release     patient here to see if she can be released to go back to work     History of Present Illness     Vit b12 improvement.  On otc.  Less tired.  durraton unknown.    Lower back pain improved. Not resolved. 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    Multiple rounds of steroids for lower back pain.    PT had been helping but insurance stopped paying for PT. Pt got a big bill.  Had to stop.    Pt will need to call ins back and get it worked out.            The following portions of the patient's history were reviewed and updated as appropriate: allergies, current medications, past family history, past medical history, past social history, past surgical history and problem list.    Review of Systems   Constitutional: Negative for fatigue.   Musculoskeletal: Positive for arthralgias and back pain. Negative for gait problem, joint swelling and myalgias.   All other systems reviewed and are negative.      Current Outpatient Medications:   •  albuterol sulfate  (90 Base) MCG/ACT inhaler, Inhale 2 puffs Every 4 (Four) Hours As Needed for Wheezing., Disp: 1 inhaler, Rfl: 0  •  benzonatate (TESSALON) 100 MG capsule, Take 1 capsule by mouth 3 (Three) Times a Day As Needed for Cough., Disp: 15 capsule, Rfl: 0  •  cefdinir (OMNICEF) 300 MG capsule, Take 1 capsule by mouth 2 (Two) Times a Day for 10 days., Disp: 20 capsule, Rfl: 0  •  citalopram (CeleXA) 20 MG tablet, Take 1 tablet by mouth Daily., Disp: 30 tablet, Rfl: 3  •  methocarbamol (ROBAXIN) 500 MG tablet, , Disp: , Rfl:   •  norelgestromin-ethinyl estradiol (XULANE) 150-35 MCG/24HR, Place 1 patch on the skin 1 (One) Time Per Week., Disp: , Rfl:   •  ondansetron ODT (ZOFRAN-ODT) 4 MG disintegrating tablet, Take 1 tablet by mouth  "Every 6 (Six) Hours As Needed for Nausea or Vomiting., Disp: 12 tablet, Rfl: 0  •  predniSONE (DELTASONE) 10 MG tablet, START 2/23/19 6 DAY TAPER: 6,5,4,3,2,1., Disp: 21 tablet, Rfl: 0  •  vitamin B-12 (CYANOCOBALAMIN) 500 MCG tablet, Take 1,000 mcg by mouth Daily., Disp: , Rfl:     Objective      Blood pressure 103/67, pulse 83, temperature 97.9 °F (36.6 °C), temperature source Oral, resp. rate 16, height 172.7 cm (68\"), weight 58.1 kg (128 lb 1.9 oz), last menstrual period 02/11/2019, SpO2 100 %.    Physical Exam     General Appearance:    Alert, cooperative, no distress, appears stated age   Head:    Normocephalic, without obvious abnormality, atraumatic   Eyes:    PERRL, conjunctiva/corneas clear, EOM's intact   Ears:    Normal TM's and external ear canals, both ears   Nose:   Nares normal, septum midline, mucosa normal, no drainage   or sinus tenderness   Throat:   Lips, mucosa, and tongue normal; teeth and gums normal   Neck:   Supple, symmetrical, trachea midline, no adenopathy;        thyroid:  No enlargement/tenderness/nodules; no carotid    bruit or JVD   Back:     Symmetric, no curvature, ROM normal, no CVA tenderness   Lungs:     Clear to auscultation bilaterally, respirations unlabored   Chest wall:    No tenderness or deformity   Heart:    Regular rate and rhythm, S1 and S2 normal, no murmur,        rub or gallop   Abdomen:     Soft, non-tender, bowel sounds active all four quadrants,     no masses, no organomegaly   Extremities:   Extremities normal, atraumatic, no cyanosis or edema   Pulses:   2+ and symmetric all extremities   Skin:   Skin color, texture, turgor normal, no rashes or lesions   Lymph nodes:   Cervical, supraclavicular, and axillary nodes normal   Neurologic:   CNII-XII intact. Normal strength, sensation and reflexes       throughout      Results for orders placed or performed during the hospital encounter of 02/22/19   POCT Rapid Strep A   Result Value Ref Range    Rapid Strep A " Screen Negative Negative, VALID, INVALID, Not Performed    Internal Control Passed Passed    Lot Number [id number]     Expiration Date 6,302,020    POCT Influenza A/B   Result Value Ref Range    Rapid Influenza A Ag Negative Negative    Rapid Influenza B Ag Negative Negative    Internal Control Passed Passed    Lot Number [id number]     Expiration Date 3,212,021          Assessment/Plan           Elen was seen today for vitamin b12 deficiency and work release.    Diagnoses and all orders for this visit:    Low vitamin B12 level  -     Lipid Panel  -     CBC & Differential  -     Comprehensive Metabolic Panel  -     Vitamin B12  -     TSH  -     T4, Free    Fatigue, unspecified type  -     Lipid Panel  -     CBC & Differential  -     Comprehensive Metabolic Panel  -     Vitamin B12  -     TSH  -     T4, Free    Chronic bilateral low back pain without sciatica      Return in about 6 months (around 8/27/2019).          There are no Patient Instructions on file for this visit.     Bentley Langston MD    Assessment/Plan

## 2019-04-25 ENCOUNTER — OFFICE VISIT (OUTPATIENT)
Dept: INTERNAL MEDICINE | Facility: CLINIC | Age: 24
End: 2019-04-25

## 2019-04-25 VITALS
BODY MASS INDEX: 19.97 KG/M2 | TEMPERATURE: 98.3 F | DIASTOLIC BLOOD PRESSURE: 66 MMHG | HEART RATE: 80 BPM | SYSTOLIC BLOOD PRESSURE: 107 MMHG | HEIGHT: 68 IN | OXYGEN SATURATION: 100 % | RESPIRATION RATE: 16 BRPM | WEIGHT: 131.8 LBS

## 2019-04-25 DIAGNOSIS — R10.84 GENERALIZED ABDOMINAL PAIN: ICD-10-CM

## 2019-04-25 DIAGNOSIS — R06.81 APNEA: ICD-10-CM

## 2019-04-25 DIAGNOSIS — F41.9 ANXIETY: ICD-10-CM

## 2019-04-25 DIAGNOSIS — R53.83 FATIGUE, UNSPECIFIED TYPE: Primary | ICD-10-CM

## 2019-04-25 DIAGNOSIS — R19.5 MUCOUS IN STOOLS: ICD-10-CM

## 2019-04-25 LAB
ALBUMIN SERPL-MCNC: 4.3 G/DL (ref 3.5–5)
ALBUMIN/GLOB SERPL: 1.4 G/DL (ref 1–2)
ALP SERPL-CCNC: 73 U/L (ref 38–126)
ALT SERPL-CCNC: 15 U/L (ref 13–69)
AST SERPL-CCNC: 20 U/L (ref 15–46)
BASOPHILS # BLD AUTO: 0.03 10*3/MM3 (ref 0–0.2)
BASOPHILS NFR BLD AUTO: 0.5 % (ref 0–1.5)
BILIRUB SERPL-MCNC: 0.5 MG/DL (ref 0.2–1.3)
BUN SERPL-MCNC: 12 MG/DL (ref 7–20)
BUN/CREAT SERPL: 20 (ref 7.1–23.5)
CALCIUM SERPL-MCNC: 9.6 MG/DL (ref 8.4–10.2)
CHLORIDE SERPL-SCNC: 102 MMOL/L (ref 98–107)
CO2 SERPL-SCNC: 27 MMOL/L (ref 26–30)
CREAT SERPL-MCNC: 0.6 MG/DL (ref 0.6–1.3)
EOSINOPHIL # BLD AUTO: 0.07 10*3/MM3 (ref 0–0.4)
EOSINOPHIL NFR BLD AUTO: 1.1 % (ref 0.3–6.2)
ERYTHROCYTE [DISTWIDTH] IN BLOOD BY AUTOMATED COUNT: 14.4 % (ref 12.3–15.4)
GLOBULIN SER CALC-MCNC: 3 GM/DL
GLUCOSE SERPL-MCNC: 83 MG/DL (ref 74–98)
HCT VFR BLD AUTO: 40 % (ref 34–46.6)
HGB BLD-MCNC: 12.7 G/DL (ref 12–15.9)
IMM GRANULOCYTES # BLD AUTO: 0.01 10*3/MM3 (ref 0–0.05)
IMM GRANULOCYTES NFR BLD AUTO: 0.2 % (ref 0–0.5)
LYMPHOCYTES # BLD AUTO: 1.79 10*3/MM3 (ref 0.7–3.1)
LYMPHOCYTES NFR BLD AUTO: 28.3 % (ref 19.6–45.3)
MCH RBC QN AUTO: 27 PG (ref 26.6–33)
MCHC RBC AUTO-ENTMCNC: 31.8 G/DL (ref 31.5–35.7)
MCV RBC AUTO: 84.9 FL (ref 79–97)
MONOCYTES # BLD AUTO: 0.48 10*3/MM3 (ref 0.1–0.9)
MONOCYTES NFR BLD AUTO: 7.6 % (ref 5–12)
NEUTROPHILS # BLD AUTO: 3.95 10*3/MM3 (ref 1.7–7)
NEUTROPHILS NFR BLD AUTO: 62.3 % (ref 42.7–76)
NRBC BLD AUTO-RTO: 0 /100 WBC (ref 0–0.2)
PLATELET # BLD AUTO: 264 10*3/MM3 (ref 140–450)
POTASSIUM SERPL-SCNC: 4.4 MMOL/L (ref 3.5–5.1)
PROT SERPL-MCNC: 7.3 G/DL (ref 6.3–8.2)
RBC # BLD AUTO: 4.71 10*6/MM3 (ref 3.77–5.28)
SODIUM SERPL-SCNC: 140 MMOL/L (ref 137–145)
T4 FREE SERPL-MCNC: 1.04 NG/DL (ref 0.78–2.19)
TSH SERPL DL<=0.005 MIU/L-ACNC: 1.27 MIU/ML (ref 0.47–4.68)
VIT B12 SERPL-MCNC: 396 PG/ML (ref 239–931)
WBC # BLD AUTO: 6.33 10*3/MM3 (ref 3.4–10.8)

## 2019-04-25 PROCEDURE — 99214 OFFICE O/P EST MOD 30 MIN: CPT | Performed by: INTERNAL MEDICINE

## 2019-04-25 RX ORDER — CITALOPRAM 20 MG/1
20 TABLET ORAL DAILY
Qty: 30 TABLET | Refills: 3 | Status: CANCELLED | OUTPATIENT
Start: 2019-04-25

## 2019-04-25 NOTE — PROGRESS NOTES
"Subjective     Patient ID: Elen Cleveland is a 24 y.o. female. Patient is here for management of multiple medical problems.     Chief Complaint   Patient presents with   • Fatigue     patient states the fatigue is getting worse, she also states needs 12 hours of sleep just to feel sort of normal     History of Present Illness     On b12 supplement. Getting more tired and waking tired.  Tired all the time.  Sleep 12 hours a night if not working.    Pt was to get in with DR Lawrence in the past.  Staring to snore.       mucose in BM everytime merly 2 weeks.  Diarrhea 4 x yesterday.      The following portions of the patient's history were reviewed and updated as appropriate: allergies, current medications, past family history, past medical history, past social history, past surgical history and problem list.    Review of Systems   Constitutional: Positive for fatigue.   Respiratory: Negative for cough, choking and shortness of breath.    Psychiatric/Behavioral: Negative for dysphoric mood, hallucinations and sleep disturbance.   All other systems reviewed and are negative.      Current Outpatient Medications:   •  norelgestromin-ethinyl estradiol (XULANE) 150-35 MCG/24HR, Place 1 patch on the skin as directed by provider 1 (One) Time Per Week., Disp: 3 patch, Rfl: 5  •  vitamin B-12 (CYANOCOBALAMIN) 500 MCG tablet, Take 1,000 mcg by mouth Daily., Disp: , Rfl:   •  sertraline (ZOLOFT) 50 MG tablet, Take 1 tablet by mouth Daily., Disp: 30 tablet, Rfl: 3    Objective      Blood pressure 107/66, pulse 80, temperature 98.3 °F (36.8 °C), temperature source Oral, resp. rate 16, height 172.7 cm (68\"), weight 59.8 kg (131 lb 12.8 oz), last menstrual period 04/15/2019, SpO2 100 %.    Physical Exam     General Appearance:    Alert, cooperative, no distress, appears stated age   Head:    Normocephalic, without obvious abnormality, atraumatic   Eyes:    PERRL, conjunctiva/corneas clear, EOM's intact   Ears:    Normal TM's and " external ear canals, both ears   Nose:   Nares normal, septum midline, mucosa normal, no drainage   or sinus tenderness   Throat:   Lips, mucosa, and tongue normal; teeth and gums normal   Neck:   Supple, symmetrical, trachea midline, no adenopathy;        thyroid:  No enlargement/tenderness/nodules; no carotid    bruit or JVD   Back:     Symmetric, no curvature, ROM normal, no CVA tenderness   Lungs:     Clear to auscultation bilaterally, respirations unlabored   Chest wall:    No tenderness or deformity   Heart:    Regular rate and rhythm, S1 and S2 normal, no murmur,        rub or gallop   Abdomen:     Soft, non-tender, bowel sounds active all four quadrants,     no masses, no organomegaly   Extremities:   Extremities normal, atraumatic, no cyanosis or edema   Pulses:   2+ and symmetric all extremities   Skin:   Skin color, texture, turgor normal, no rashes or lesions   Lymph nodes:   Cervical, supraclavicular, and axillary nodes normal   Neurologic:   CNII-XII intact. Normal strength, sensation and reflexes       throughout      Results for orders placed or performed during the hospital encounter of 02/22/19   POCT Rapid Strep A   Result Value Ref Range    Rapid Strep A Screen Negative Negative, VALID, INVALID, Not Performed    Internal Control Passed Passed    Lot Number [id number]     Expiration Date 6,302,020    POCT Influenza A/B   Result Value Ref Range    Rapid Influenza A Ag Negative Negative    Rapid Influenza B Ag Negative Negative    Internal Control Passed Passed    Lot Number [id number]     Expiration Date 3,212,021          Assessment/Plan       Elen was seen today for fatigue.    Diagnoses and all orders for this visit:    Fatigue, unspecified type  -     sertraline (ZOLOFT) 50 MG tablet; Take 1 tablet by mouth Daily.  -     Ambulatory Referral to Neurology  -     Vitamin B12  -     Comprehensive Metabolic Panel  -     CBC & Differential  -     TSH  -     T4, Free  -     Occult Blood, Fecal By  Immunoassay - Stool, Per Rectum  -     Fecal Lactoferrin - Stool, Per Rectum  -     Gastrointestinal Panel, PCR - Stool, Per Rectum  -     Fecal Leukocytes - Stool, Per Rectum    Anxiety  -     sertraline (ZOLOFT) 50 MG tablet; Take 1 tablet by mouth Daily.  -     Ambulatory Referral to Neurology  -     Vitamin B12  -     Comprehensive Metabolic Panel  -     CBC & Differential  -     TSH  -     T4, Free    Apnea  -     Ambulatory Referral to Neurology  -     Vitamin B12  -     Comprehensive Metabolic Panel  -     CBC & Differential  -     TSH  -     T4, Free    Generalized abdominal pain  -     Endomysial Antibody, IgA / IGG Titer  -     Glia(IgA / G) & TTG(IgA / G)  -     H. Pylori Antibody, IgA  -     H. Pylori Antibody, IgG    Mucous in stools  -     Endomysial Antibody, IgA / IGG Titer  -     Glia(IgA / G) & TTG(IgA / G)  -     H. Pylori Antibody, IgA  -     H. Pylori Antibody, IgG  -     Occult Blood, Fecal By Immunoassay - Stool, Per Rectum  -     Fecal Lactoferrin - Stool, Per Rectum  -     Gastrointestinal Panel, PCR - Stool, Per Rectum  -     Fecal Leukocytes - Stool, Per Rectum    Other orders  -     norelgestromin-ethinyl estradiol (XULANE) 150-35 MCG/24HR; Place 1 patch on the skin as directed by provider 1 (One) Time Per Week.  -     Cancel: citalopram (CeleXA) 20 MG tablet; Take 1 tablet by mouth Daily.      Return in about 2 weeks (around 5/9/2019), or if symptoms worsen or fail to improve.          There are no Patient Instructions on file for this visit.     Bentley Langston MD    Assessment/Plan

## 2019-04-26 NOTE — PROGRESS NOTES
Please let them know the look good. b12 still low normal. If not on b12 please start methyl b12 1000 mcg a day.

## 2019-05-07 LAB
ADV 40+41 DNA STL QL NAA+NON-PROBE: NOT DETECTED
ASTRO TYP 1-8 RNA STL QL NAA+NON-PROBE: NOT DETECTED
C CAYETANENSIS DNA STL QL NAA+NON-PROBE: NOT DETECTED
C COLI+JEJ+UPSA DNA STL QL NAA+NON-PROBE: NOT DETECTED
C DIF TOX TCDA+TCDB STL QL NAA+NON-PROBE: NOT DETECTED
CRYPTOSP DNA STL QL NAA+NON-PROBE: NOT DETECTED
E COLI O157 DNA STL QL NAA+NON-PROBE: NORMAL
E HISTOLYT DNA STL QL NAA+NON-PROBE: NOT DETECTED
EAEC PAA PLAS AGGR+AATA ST NAA+NON-PRB: NOT DETECTED
EC STX1+STX2 GENES STL QL NAA+NON-PROBE: NOT DETECTED
EPEC EAE GENE STL QL NAA+NON-PROBE: NOT DETECTED
ETEC LTA+ST1A+ST1B TOX ST NAA+NON-PROBE: NOT DETECTED
G LAMBLIA DNA STL QL NAA+NON-PROBE: NOT DETECTED
HEMOCCULT STL QL IA: NEGATIVE
LACTOFERRIN STL-MCNC: 1.8 UG/ML(G) (ref 0–7.24)
NOROVIRUS GI+II RNA STL QL NAA+NON-PROBE: NOT DETECTED
P SHIGELLOIDES DNA STL QL NAA+NON-PROBE: NOT DETECTED
RVA RNA STL QL NAA+NON-PROBE: NOT DETECTED
S ENT+BONG DNA STL QL NAA+NON-PROBE: NOT DETECTED
SAPO I+II+IV+V RNA STL QL NAA+NON-PROBE: NOT DETECTED
SHIGELLA SP+EIEC IPAH ST NAA+NON-PROBE: NOT DETECTED
V CHOL+PARA+VUL DNA STL QL NAA+NON-PROBE: NOT DETECTED
V CHOLERAE DNA STL QL NAA+NON-PROBE: NOT DETECTED
WBC STL QL MICRO: NORMAL
WBC STL QL MICRO: NORMAL
Y ENTEROCOL DNA STL QL NAA+NON-PROBE: NOT DETECTED

## 2019-05-13 ENCOUNTER — OFFICE VISIT (OUTPATIENT)
Dept: INTERNAL MEDICINE | Facility: CLINIC | Age: 24
End: 2019-05-13

## 2019-05-13 VITALS
SYSTOLIC BLOOD PRESSURE: 96 MMHG | WEIGHT: 130.12 LBS | OXYGEN SATURATION: 100 % | BODY MASS INDEX: 20.42 KG/M2 | TEMPERATURE: 98.8 F | RESPIRATION RATE: 16 BRPM | HEIGHT: 67 IN | DIASTOLIC BLOOD PRESSURE: 59 MMHG | HEART RATE: 86 BPM

## 2019-05-13 DIAGNOSIS — E53.8 VITAMIN B12 DEFICIENCY: ICD-10-CM

## 2019-05-13 DIAGNOSIS — L56.8: Primary | ICD-10-CM

## 2019-05-13 PROCEDURE — 99214 OFFICE O/P EST MOD 30 MIN: CPT | Performed by: INTERNAL MEDICINE

## 2019-05-13 NOTE — PROGRESS NOTES
"Subjective     Patient ID: Elen Cleveland is a 24 y.o. female. Patient is here for management of multiple medical problems.     Chief Complaint   Patient presents with   • Fatigue     follow-up   • Sunburn     patient has a painful sunburn on both thighs x 1 week     History of Present Illness     Pt went to [facility] on Friday.  Steroids.   Pt still with sig pain and difficult walk.    Diarrhea less.   Once every other day.      Vit b12 bid is helping        The following portions of the patient's history were reviewed and updated as appropriate: allergies, current medications, past family history, past medical history, past social history, past surgical history and problem list.    Review of Systems   Constitutional: Negative for fatigue.   Respiratory: Negative for shortness of breath and stridor.    Musculoskeletal: Negative for arthralgias, back pain, gait problem and joint swelling.   Skin: Positive for color change, rash and wound.   All other systems reviewed and are negative.      Current Outpatient Medications:   •  ibuprofen (ADVIL,MOTRIN) 800 MG tablet, Take 1 tablet by mouth Every 8 (Eight) Hours As Needed (pain)., Disp: 45 tablet, Rfl: 0  •  norelgestromin-ethinyl estradiol (XULANE) 150-35 MCG/24HR, Place 1 patch on the skin as directed by provider 1 (One) Time Per Week., Disp: 3 patch, Rfl: 5  •  sertraline (ZOLOFT) 50 MG tablet, Take 1 tablet by mouth Daily., Disp: 30 tablet, Rfl: 3  •  vitamin B-12 (CYANOCOBALAMIN) 500 MCG tablet, Take 1,000 mcg by mouth Daily., Disp: , Rfl:   •  methylPREDNISolone (MEDROL, LAURENT,) 4 MG tablet, Take as directed on package instructions., Disp: 21 tablet, Rfl: 0  •  silver sulfadiazine (SILVADENE) 1 % cream, Apply  topically to the appropriate area as directed 2 (Two) Times a Day., Disp: 100 g, Rfl: 0    Objective      Blood pressure 96/59, pulse 86, temperature 98.8 °F (37.1 °C), temperature source Oral, resp. rate 16, height 170.2 cm (67\"), weight 59 kg (130 lb 1.9 " oz), last menstrual period 04/15/2019, SpO2 100 %.    Physical Exam     General Appearance:    Alert, cooperative, no distress, appears stated age   Head:    Normocephalic, without obvious abnormality, atraumatic   Eyes:    PERRL, conjunctiva/corneas clear, EOM's intact   Ears:    Normal TM's and external ear canals, both ears   Nose:   Nares normal, septum midline, mucosa normal, no drainage   or sinus tenderness   Throat:   Lips, mucosa, and tongue normal; teeth and gums normal   Neck:   Supple, symmetrical, trachea midline, no adenopathy;        thyroid:  No enlargement/tenderness/nodules; no carotid    bruit or JVD   Back:     Symmetric, no curvature, ROM normal, no CVA tenderness   Lungs:     Clear to auscultation bilaterally, respirations unlabored   Chest wall:    No tenderness or deformity   Heart:    Regular rate and rhythm, S1 and S2 normal, no murmur,        rub or gallop   Abdomen:     Soft, non-tender, bowel sounds active all four quadrants,     no masses, no organomegaly   Extremities:   Extremities normal, atraumatic, no cyanosis or edema   Pulses:   2+ and symmetric all extremities   Skin:   Sever erythema anterior thighs.       Lymph nodes:   Cervical, supraclavicular, and axillary nodes normal   Neurologic:   CNII-XII intact. Normal strength, sensation and reflexes       throughout      Results for orders placed or performed in visit on 04/25/19   Gastrointestinal Panel, PCR - Stool, Per Rectum   Result Value Ref Range    Campylobacter Not Detected Not Detected    C.difficile toxin A/B Not Detected Not Detected    Plesiomonas shigelloides Not Detected Not Detected    Salmonella Not Detected Not Detected    Vibrio Not Detected Not Detected    Vibrio cholerae Not Detected Not Detected    Yersinia enterocolitica Not Detected Not Detected    Enteroaggregative E. coli Not Detected Not Detected    Enteropathogenic E. coli Not Detected Not Detected    Enterotoxigenic E. coli Not Detected Not Detected     Shiga-like toxin-producing E. coli  Not Detected Not Detected    E. coli O157 Not applicable Not Detected    Shigella enteroinvasive E. coli Not Detected Not Detected    Cryptosporidium Not Detected Not Detected    Cyclospora cayetanensis Not Detected Not Detected    Entamoeba Histolytica Ag Not Detected Not Detected    Giardia lamblia Not Detected Not Detected    Adenovirus 40/41 Ag Not Detected Not Detected    Astrovirus Not Detected Not Detected    Norovirus GI/GII Not Detected Not Detected    Rotavirus A Not Detected Not Detected    Sapovirus Not Detected Not Detected   Fecal Leukocytes - Stool, Per Rectum   Result Value Ref Range    Fecal Leukocytes Final report None Seen    Result 1 Comment    Vitamin B12   Result Value Ref Range    Vitamin B-12 396 239 - 931 pg/mL   Comprehensive Metabolic Panel   Result Value Ref Range    Glucose 83 74 - 98 mg/dL    BUN 12 7 - 20 mg/dL    Creatinine 0.60 0.60 - 1.30 mg/dL    eGFR Non African Am 123 >60 mL/min/1.73    eGFR African Am 149 >60 mL/min/1.73    BUN/Creatinine Ratio 20.0 7.1 - 23.5    Sodium 140 137 - 145 mmol/L    Potassium 4.4 3.5 - 5.1 mmol/L    Chloride 102 98 - 107 mmol/L    Total CO2 27.0 26.0 - 30.0 mmol/L    Calcium 9.6 8.4 - 10.2 mg/dL    Total Protein 7.3 6.3 - 8.2 g/dL    Albumin 4.30 3.50 - 5.00 g/dL    Globulin 3.0 gm/dL    A/G Ratio 1.4 1.0 - 2.0 g/dL    Total Bilirubin 0.5 0.2 - 1.3 mg/dL    Alkaline Phosphatase 73 38 - 126 U/L    AST (SGOT) 20 15 - 46 U/L    ALT (SGPT) 15 13 - 69 U/L   TSH   Result Value Ref Range    TSH 1.270 0.470 - 4.680 mIU/mL   T4, Free   Result Value Ref Range    Free T4 1.04 0.78 - 2.19 ng/dL   Occult Blood, Fecal By Immunoassay - Stool, Per Rectum   Result Value Ref Range    Fecal Occult Blood Negative Negative   Fecal Lactoferrin - Stool, Per Rectum   Result Value Ref Range    Lactoferrin, Quant 1.80 0.00 - 7.24 ug/mL(g)   CBC & Differential   Result Value Ref Range    WBC 6.33 3.40 - 10.80 10*3/mm3    RBC 4.71 3.77 - 5.28  10*6/mm3    Hemoglobin 12.7 12.0 - 15.9 g/dL    Hematocrit 40.0 34.0 - 46.6 %    MCV 84.9 79.0 - 97.0 fL    MCH 27.0 26.6 - 33.0 pg    MCHC 31.8 31.5 - 35.7 g/dL    RDW 14.4 12.3 - 15.4 %    Platelets 264 140 - 450 10*3/mm3    Neutrophil Rel % 62.3 42.7 - 76.0 %    Lymphocyte Rel % 28.3 19.6 - 45.3 %    Monocyte Rel % 7.6 5.0 - 12.0 %    Eosinophil Rel % 1.1 0.3 - 6.2 %    Basophil Rel % 0.5 0.0 - 1.5 %    Neutrophils Absolute 3.95 1.70 - 7.00 10*3/mm3    Lymphocytes Absolute 1.79 0.70 - 3.10 10*3/mm3    Monocytes Absolute 0.48 0.10 - 0.90 10*3/mm3    Eosinophils Absolute 0.07 0.00 - 0.40 10*3/mm3    Basophils Absolute 0.03 0.00 - 0.20 10*3/mm3    Immature Granulocyte Rel % 0.2 0.0 - 0.5 %    Immature Grans Absolute 0.01 0.00 - 0.05 10*3/mm3    nRBC 0.0 0.0 - 0.2 /100 WBC         Assessment/Plan       Elen was seen today for fatigue and sunburn.    Diagnoses and all orders for this visit:    Solar radiation dermatitis  -     CBC & Differential  -     Vitamin B12  -     Comprehensive Metabolic Panel  -     TSH  -     T4, Free  -     Lipid Panel    Vitamin B12 deficiency  -     CBC & Differential  -     Vitamin B12  -     Comprehensive Metabolic Panel  -     TSH  -     T4, Free  -     Lipid Panel    Other orders  -     silver sulfadiazine (SILVADENE) 1 % cream; Apply  topically to the appropriate area as directed 2 (Two) Times a Day.      Return in about 6 months (around 11/13/2019).          There are no Patient Instructions on file for this visit.     Bentley Langston MD    Assessment/Plan

## 2019-05-27 ENCOUNTER — APPOINTMENT (OUTPATIENT)
Dept: CT IMAGING | Facility: HOSPITAL | Age: 24
End: 2019-05-27

## 2019-05-27 ENCOUNTER — HOSPITAL ENCOUNTER (EMERGENCY)
Facility: HOSPITAL | Age: 24
Discharge: HOME | End: 2019-05-27
Attending: EMERGENCY MEDICINE | Admitting: EMERGENCY MEDICINE

## 2019-05-27 VITALS
DIASTOLIC BLOOD PRESSURE: 61 MMHG | OXYGEN SATURATION: 97 % | SYSTOLIC BLOOD PRESSURE: 102 MMHG | HEART RATE: 77 BPM | RESPIRATION RATE: 16 BRPM | WEIGHT: 132.6 LBS | TEMPERATURE: 98.5 F | BODY MASS INDEX: 20.1 KG/M2 | HEIGHT: 68 IN

## 2019-05-27 DIAGNOSIS — R10.9 ABDOMINAL PAIN, UNSPECIFIED ABDOMINAL LOCATION: Primary | ICD-10-CM

## 2019-05-27 LAB
ALBUMIN SERPL-MCNC: 4.2 G/DL (ref 3.5–5)
ALBUMIN/GLOB SERPL: 1.2 G/DL (ref 1–2)
ALP SERPL-CCNC: 71 U/L (ref 38–126)
ALT SERPL W P-5'-P-CCNC: 18 U/L (ref 13–69)
ANION GAP SERPL CALCULATED.3IONS-SCNC: 15.4 MMOL/L (ref 10–20)
AST SERPL-CCNC: 19 U/L (ref 15–46)
B-HCG UR QL: NEGATIVE
BACTERIA UR QL AUTO: ABNORMAL /HPF
BASOPHILS # BLD AUTO: 0.02 10*3/MM3 (ref 0–0.2)
BASOPHILS NFR BLD AUTO: 0.2 % (ref 0–1.5)
BILIRUB SERPL-MCNC: 0.6 MG/DL (ref 0.2–1.3)
BILIRUB UR QL STRIP: NEGATIVE
BUN BLD-MCNC: 10 MG/DL (ref 7–20)
BUN/CREAT SERPL: 14.3 (ref 7.1–23.5)
CALCIUM SPEC-SCNC: 8.5 MG/DL (ref 8.4–10.2)
CHLORIDE SERPL-SCNC: 101 MMOL/L (ref 98–107)
CLARITY UR: CLEAR
CO2 SERPL-SCNC: 25 MMOL/L (ref 26–30)
COLOR UR: YELLOW
CREAT BLD-MCNC: 0.7 MG/DL (ref 0.6–1.3)
DEPRECATED RDW RBC AUTO: 40.3 FL (ref 37–54)
EOSINOPHIL # BLD AUTO: 0.01 10*3/MM3 (ref 0–0.4)
EOSINOPHIL NFR BLD AUTO: 0.1 % (ref 0.3–6.2)
ERYTHROCYTE [DISTWIDTH] IN BLOOD BY AUTOMATED COUNT: 13.1 % (ref 12.3–15.4)
GFR SERPL CREATININE-BSD FRML MDRD: 103 ML/MIN/1.73
GLOBULIN UR ELPH-MCNC: 3.6 GM/DL
GLUCOSE BLD-MCNC: 120 MG/DL (ref 74–98)
GLUCOSE UR STRIP-MCNC: NEGATIVE MG/DL
HCT VFR BLD AUTO: 39.4 % (ref 34–46.6)
HGB BLD-MCNC: 12.7 G/DL (ref 12–15.9)
HGB UR QL STRIP.AUTO: NEGATIVE
HOLD SPECIMEN: NORMAL
HYALINE CASTS UR QL AUTO: ABNORMAL /LPF
IMM GRANULOCYTES # BLD AUTO: 0.02 10*3/MM3 (ref 0–0.05)
IMM GRANULOCYTES NFR BLD AUTO: 0.2 % (ref 0–0.5)
KETONES UR QL STRIP: ABNORMAL
LEUKOCYTE ESTERASE UR QL STRIP.AUTO: ABNORMAL
LIPASE SERPL-CCNC: 65 U/L (ref 23–300)
LYMPHOCYTES # BLD AUTO: 0.79 10*3/MM3 (ref 0.7–3.1)
LYMPHOCYTES NFR BLD AUTO: 7.3 % (ref 19.6–45.3)
MCH RBC QN AUTO: 27.4 PG (ref 26.6–33)
MCHC RBC AUTO-ENTMCNC: 32.2 G/DL (ref 31.5–35.7)
MCV RBC AUTO: 85.1 FL (ref 79–97)
MONOCYTES # BLD AUTO: 0.59 10*3/MM3 (ref 0.1–0.9)
MONOCYTES NFR BLD AUTO: 5.5 % (ref 5–12)
NEUTROPHILS # BLD AUTO: 9.34 10*3/MM3 (ref 1.7–7)
NEUTROPHILS NFR BLD AUTO: 86.7 % (ref 42.7–76)
NITRITE UR QL STRIP: NEGATIVE
NRBC BLD AUTO-RTO: 0 /100 WBC (ref 0–0.2)
PH UR STRIP.AUTO: 5.5 [PH] (ref 5–8)
PLATELET # BLD AUTO: 260 10*3/MM3 (ref 140–450)
PMV BLD AUTO: 9.8 FL (ref 6–12)
POTASSIUM BLD-SCNC: 3.4 MMOL/L (ref 3.5–5.1)
PROT SERPL-MCNC: 7.8 G/DL (ref 6.3–8.2)
PROT UR QL STRIP: NEGATIVE
RBC # BLD AUTO: 4.63 10*6/MM3 (ref 3.77–5.28)
RBC # UR: ABNORMAL /HPF
REF LAB TEST METHOD: ABNORMAL
SODIUM BLD-SCNC: 138 MMOL/L (ref 137–145)
SP GR UR STRIP: 1.02 (ref 1–1.03)
SQUAMOUS #/AREA URNS HPF: ABNORMAL /HPF
UROBILINOGEN UR QL STRIP: ABNORMAL
WBC NRBC COR # BLD: 10.77 10*3/MM3 (ref 3.4–10.8)
WBC UR QL AUTO: ABNORMAL /HPF
WHOLE BLOOD HOLD SPECIMEN: NORMAL
WHOLE BLOOD HOLD SPECIMEN: NORMAL

## 2019-05-27 PROCEDURE — 25010000002 IOPAMIDOL 61 % SOLUTION: Performed by: EMERGENCY MEDICINE

## 2019-05-27 PROCEDURE — 81025 URINE PREGNANCY TEST: CPT | Performed by: PHYSICIAN ASSISTANT

## 2019-05-27 PROCEDURE — 99284 EMERGENCY DEPT VISIT MOD MDM: CPT

## 2019-05-27 PROCEDURE — 83690 ASSAY OF LIPASE: CPT

## 2019-05-27 PROCEDURE — 25010000002 ONDANSETRON PER 1 MG: Performed by: PHYSICIAN ASSISTANT

## 2019-05-27 PROCEDURE — 81001 URINALYSIS AUTO W/SCOPE: CPT

## 2019-05-27 PROCEDURE — 85025 COMPLETE CBC W/AUTO DIFF WBC: CPT

## 2019-05-27 PROCEDURE — 25010000002 METHYLPREDNISOLONE PER 40 MG: Performed by: PHYSICIAN ASSISTANT

## 2019-05-27 PROCEDURE — 80053 COMPREHEN METABOLIC PANEL: CPT

## 2019-05-27 PROCEDURE — 74177 CT ABD & PELVIS W/CONTRAST: CPT

## 2019-05-27 PROCEDURE — 96375 TX/PRO/DX INJ NEW DRUG ADDON: CPT

## 2019-05-27 PROCEDURE — 25010000002 DIPHENHYDRAMINE PER 50 MG: Performed by: PHYSICIAN ASSISTANT

## 2019-05-27 PROCEDURE — 96374 THER/PROPH/DIAG INJ IV PUSH: CPT

## 2019-05-27 RX ORDER — METHYLPREDNISOLONE SODIUM SUCCINATE 40 MG/ML
80 INJECTION, POWDER, LYOPHILIZED, FOR SOLUTION INTRAMUSCULAR; INTRAVENOUS ONCE
Status: COMPLETED | OUTPATIENT
Start: 2019-05-27 | End: 2019-05-27

## 2019-05-27 RX ORDER — SODIUM CHLORIDE 0.9 % (FLUSH) 0.9 %
10 SYRINGE (ML) INJECTION AS NEEDED
Status: DISCONTINUED | OUTPATIENT
Start: 2019-05-27 | End: 2019-05-27 | Stop reason: HOSPADM

## 2019-05-27 RX ORDER — DIPHENHYDRAMINE HYDROCHLORIDE 50 MG/ML
25 INJECTION INTRAMUSCULAR; INTRAVENOUS ONCE
Status: COMPLETED | OUTPATIENT
Start: 2019-05-27 | End: 2019-05-27

## 2019-05-27 RX ORDER — ONDANSETRON 2 MG/ML
4 INJECTION INTRAMUSCULAR; INTRAVENOUS ONCE
Status: COMPLETED | OUTPATIENT
Start: 2019-05-27 | End: 2019-05-27

## 2019-05-27 RX ADMIN — ONDANSETRON 4 MG: 2 INJECTION INTRAMUSCULAR; INTRAVENOUS at 18:47

## 2019-05-27 RX ADMIN — METHYLPREDNISOLONE SODIUM SUCCINATE 80 MG: 40 INJECTION, POWDER, FOR SOLUTION INTRAMUSCULAR; INTRAVENOUS at 19:45

## 2019-05-27 RX ADMIN — DIPHENHYDRAMINE HYDROCHLORIDE 25 MG: 50 INJECTION INTRAMUSCULAR; INTRAVENOUS at 19:45

## 2019-05-27 RX ADMIN — SODIUM CHLORIDE 1000 ML: 9 INJECTION, SOLUTION INTRAVENOUS at 18:47

## 2019-05-27 RX ADMIN — IOPAMIDOL 100 ML: 612 INJECTION, SOLUTION INTRAVENOUS at 20:13

## 2019-06-23 ENCOUNTER — APPOINTMENT (OUTPATIENT)
Dept: LAB | Facility: HOSPITAL | Age: 24
End: 2019-06-23

## 2019-07-12 ENCOUNTER — OFFICE VISIT (OUTPATIENT)
Dept: INTERNAL MEDICINE | Facility: CLINIC | Age: 24
End: 2019-07-12

## 2019-07-12 VITALS
HEART RATE: 77 BPM | TEMPERATURE: 98.2 F | SYSTOLIC BLOOD PRESSURE: 103 MMHG | DIASTOLIC BLOOD PRESSURE: 66 MMHG | OXYGEN SATURATION: 99 % | WEIGHT: 130.8 LBS | RESPIRATION RATE: 16 BRPM | HEIGHT: 68 IN | BODY MASS INDEX: 19.82 KG/M2

## 2019-07-12 DIAGNOSIS — M51.26 LUMBAR DISC HERNIATION: ICD-10-CM

## 2019-07-12 DIAGNOSIS — G89.29 CHRONIC MIDLINE LOW BACK PAIN WITH LEFT-SIDED SCIATICA: Primary | ICD-10-CM

## 2019-07-12 DIAGNOSIS — R30.0 DYSURIA: ICD-10-CM

## 2019-07-12 DIAGNOSIS — R53.83 FATIGUE, UNSPECIFIED TYPE: ICD-10-CM

## 2019-07-12 DIAGNOSIS — M54.42 CHRONIC MIDLINE LOW BACK PAIN WITH LEFT-SIDED SCIATICA: Primary | ICD-10-CM

## 2019-07-12 PROCEDURE — 99213 OFFICE O/P EST LOW 20 MIN: CPT | Performed by: INTERNAL MEDICINE

## 2019-07-12 RX ORDER — ELAGOLIX 200 MG/1
1 TABLET, FILM COATED ORAL 2 TIMES DAILY
Refills: 2 | COMMUNITY
Start: 2019-07-09 | End: 2019-09-04 | Stop reason: DRUGHIGH

## 2019-07-12 RX ORDER — ACETAMINOPHEN AND CODEINE PHOSPHATE 120; 12 MG/5ML; MG/5ML
SOLUTION ORAL
COMMUNITY
End: 2020-05-04

## 2019-07-12 NOTE — PROGRESS NOTES
"Subjective     Patient ID: Elen Cleveland is a 24 y.o. female. Patient is here for management of multiple medical problems.     Chief Complaint   Patient presents with   • Back Pain     patient continuing to have back pain, she states she needs a PT referral to [facility] due to insurance     History of Present Illness       Pt going to PT and told provider is not covered. She was told Facility was covered.   Pt now has a bill that is large.    Still with lower back pain and radicula anjana.  improving with PT.  Trying to aviod surgery.    Still with fatigue. Seen in er. Fever and chills. ua dirty catch.    The following portions of the patient's history were reviewed and updated as appropriate: allergies, current medications, past family history, past medical history, past social history, past surgical history and problem list.    Review of Systems   Constitutional: Negative for fatigue.   Musculoskeletal: Positive for arthralgias and back pain. Negative for gait problem, joint swelling and myalgias.   Psychiatric/Behavioral: Negative for self-injury and sleep disturbance. The patient is not nervous/anxious and is not hyperactive.    All other systems reviewed and are negative.      Current Outpatient Medications:   •  norethindrone (SHAROBEL) 0.35 MG tablet, Sharobel 0.35 mg tablet  Take 1 tablet every day by oral route., Disp: , Rfl:   •  ORILISSA 200 MG tablet, Take 1 tablet by mouth 2 (Two) Times a Day., Disp: , Rfl: 2  •  sertraline (ZOLOFT) 50 MG tablet, Take 1 tablet by mouth Daily., Disp: 30 tablet, Rfl: 3  •  vitamin B-12 (CYANOCOBALAMIN) 500 MCG tablet, Take 1,000 mcg by mouth Daily., Disp: , Rfl:     Objective      Blood pressure 103/66, pulse 77, temperature 98.2 °F (36.8 °C), temperature source Oral, resp. rate 16, height 172.7 cm (68\"), weight 59.3 kg (130 lb 12.8 oz), last menstrual period 06/17/2019, SpO2 99 %.    Physical Exam     General Appearance:    Alert, cooperative, no distress, appears " stated age   Head:    Normocephalic, without obvious abnormality, atraumatic   Eyes:    PERRL, conjunctiva/corneas clear, EOM's intact   Ears:    Normal TM's and external ear canals, both ears   Nose:   Nares normal, septum midline, mucosa normal, no drainage   or sinus tenderness   Throat:   Lips, mucosa, and tongue normal; teeth and gums normal   Neck:   Supple, symmetrical, trachea midline, no adenopathy;        thyroid:  No enlargement/tenderness/nodules; no carotid    bruit or JVD   Back:     Symmetric, no curvature, ROM normal, no CVA tenderness   Lungs:     Clear to auscultation bilaterally, respirations unlabored   Chest wall:    No tenderness or deformity   Heart:    Regular rate and rhythm, S1 and S2 normal, no murmur,        rub or gallop   Abdomen:     Soft, non-tender, bowel sounds active all four quadrants,     no masses, no organomegaly   Extremities:   Extremities normal, atraumatic, no cyanosis or edema   Pulses:   2+ and symmetric all extremities   Skin:   Skin color, texture, turgor normal, no rashes or lesions   Lymph nodes:   Cervical, supraclavicular, and axillary nodes normal   Neurologic:   CNII-XII intact. Normal strength, sensation and reflexes       throughout      Results for orders placed or performed during the hospital encounter of 05/27/19   Comprehensive Metabolic Panel   Result Value Ref Range    Glucose 120 (H) 74 - 98 mg/dL    BUN 10 7 - 20 mg/dL    Creatinine 0.70 0.60 - 1.30 mg/dL    Sodium 138 137 - 145 mmol/L    Potassium 3.4 (L) 3.5 - 5.1 mmol/L    Chloride 101 98 - 107 mmol/L    CO2 25.0 (L) 26.0 - 30.0 mmol/L    Calcium 8.5 8.4 - 10.2 mg/dL    Total Protein 7.8 6.3 - 8.2 g/dL    Albumin 4.20 3.50 - 5.00 g/dL    ALT (SGPT) 18 13 - 69 U/L    AST (SGOT) 19 15 - 46 U/L    Alkaline Phosphatase 71 38 - 126 U/L    Total Bilirubin 0.6 0.2 - 1.3 mg/dL    eGFR Non African Amer 103 >60 mL/min/1.73    Globulin 3.6 gm/dL    A/G Ratio 1.2 1.0 - 2.0 g/dL    BUN/Creatinine Ratio 14.3 7.1 -  23.5    Anion Gap 15.4 10.0 - 20.0 mmol/L   Lipase   Result Value Ref Range    Lipase 65 23 - 300 U/L   Urinalysis With Microscopic If Indicated (No Culture) - Urine, Clean Catch   Result Value Ref Range    Color, UA Yellow Yellow, Straw    Appearance, UA Clear Clear    pH, UA 5.5 5.0 - 8.0    Specific Gravity, UA 1.024 1.005 - 1.030    Glucose, UA Negative Negative    Ketones, UA Trace (A) Negative    Bilirubin, UA Negative Negative    Blood, UA Negative Negative    Protein, UA Negative Negative    Leuk Esterase, UA Small (1+) (A) Negative    Nitrite, UA Negative Negative    Urobilinogen, UA 0.2 E.U./dL 0.2 - 1.0 E.U./dL   CBC Auto Differential   Result Value Ref Range    WBC 10.77 3.40 - 10.80 10*3/mm3    RBC 4.63 3.77 - 5.28 10*6/mm3    Hemoglobin 12.7 12.0 - 15.9 g/dL    Hematocrit 39.4 34.0 - 46.6 %    MCV 85.1 79.0 - 97.0 fL    MCH 27.4 26.6 - 33.0 pg    MCHC 32.2 31.5 - 35.7 g/dL    RDW 13.1 12.3 - 15.4 %    RDW-SD 40.3 37.0 - 54.0 fl    MPV 9.8 6.0 - 12.0 fL    Platelets 260 140 - 450 10*3/mm3    Neutrophil % 86.7 (H) 42.7 - 76.0 %    Lymphocyte % 7.3 (L) 19.6 - 45.3 %    Monocyte % 5.5 5.0 - 12.0 %    Eosinophil % 0.1 (L) 0.3 - 6.2 %    Basophil % 0.2 0.0 - 1.5 %    Immature Grans % 0.2 0.0 - 0.5 %    Neutrophils, Absolute 9.34 (H) 1.70 - 7.00 10*3/mm3    Lymphocytes, Absolute 0.79 0.70 - 3.10 10*3/mm3    Monocytes, Absolute 0.59 0.10 - 0.90 10*3/mm3    Eosinophils, Absolute 0.01 0.00 - 0.40 10*3/mm3    Basophils, Absolute 0.02 0.00 - 0.20 10*3/mm3    Immature Grans, Absolute 0.02 0.00 - 0.05 10*3/mm3    nRBC 0.0 0.0 - 0.2 /100 WBC   Urinalysis, Microscopic Only - Urine, Clean Catch   Result Value Ref Range    RBC, UA 0-2 (A) None Seen /HPF    WBC, UA 6-12 (A) None Seen /HPF    Bacteria, UA 3+ (A) None Seen /HPF    Squamous Epithelial Cells, UA 13-20 (A) None Seen, 0-2 /HPF    Hyaline Casts, UA None Seen None Seen /LPF    Methodology Manual Light Microscopy    Pregnancy, Urine - Urine, Clean Catch   Result  Value Ref Range    HCG, Urine QL Negative Negative   Light Blue Top   Result Value Ref Range    Extra Tube hold for add-on    Green Top (Gel)   Result Value Ref Range    Extra Tube Hold for add-ons.    Lavender Top   Result Value Ref Range    Extra Tube hold for add-on    Gold Top - SST   Result Value Ref Range    Extra Tube Hold for add-ons.    Green Top (No Gel)   Result Value Ref Range    Extra Tube Hold for add-ons.          Assessment/Plan       Elen was seen today for back pain.    Diagnoses and all orders for this visit:    Chronic midline low back pain with left-sided sciatica  -     Ambulatory Referral to Physical Therapy    Lumbar disc herniation  -     Ambulatory Referral to Physical Therapy    Fatigue, unspecified type  -     Urine Culture - Urine, Urine, Clean Catch  -     Urinalysis With Microscopic - Urine, Clean Catch    Dysuria  -     Urine Culture - Urine, Urine, Clean Catch  -     Urinalysis With Microscopic - Urine, Clean Catch      Return if symptoms worsen or fail to improve.          There are no Patient Instructions on file for this visit.     Bentley Langston MD    Assessment/Plan

## 2019-07-14 LAB
APPEARANCE UR: CLEAR
BACTERIA #/AREA URNS HPF: ABNORMAL /HPF
BACTERIA UR CULT: NO GROWTH
BACTERIA UR CULT: NORMAL
BILIRUB UR QL STRIP: NEGATIVE
CASTS URNS MICRO: ABNORMAL
COLOR UR: YELLOW
EPI CELLS #/AREA URNS HPF: ABNORMAL /HPF
GLUCOSE UR QL: NEGATIVE
HGB UR QL STRIP: NEGATIVE
KETONES UR QL STRIP: NEGATIVE
LEUKOCYTE ESTERASE UR QL STRIP: (no result)
MUCOUS THREADS URNS QL MICRO: ABNORMAL /HPF
NITRITE UR QL STRIP: NEGATIVE
PH UR STRIP: 6.5 [PH] (ref 5–8)
PROT UR QL STRIP: NEGATIVE
RBC #/AREA URNS HPF: ABNORMAL /HPF
SP GR UR: 1.02 (ref 1–1.03)
UROBILINOGEN UR STRIP-MCNC: (no result) MG/DL
WBC #/AREA URNS HPF: ABNORMAL /HPF

## 2019-07-31 ENCOUNTER — TREATMENT (OUTPATIENT)
Dept: PHYSICAL THERAPY | Facility: CLINIC | Age: 24
End: 2019-07-31

## 2019-07-31 DIAGNOSIS — G89.29 CHRONIC MIDLINE LOW BACK PAIN WITH LEFT-SIDED SCIATICA: Primary | ICD-10-CM

## 2019-07-31 DIAGNOSIS — S34.3XXD CAUDA EQUINA SPINAL CORD INJURY, SUBSEQUENT ENCOUNTER: ICD-10-CM

## 2019-07-31 DIAGNOSIS — M54.50 LUMBAR PAIN: ICD-10-CM

## 2019-07-31 DIAGNOSIS — M54.42 CHRONIC MIDLINE LOW BACK PAIN WITH LEFT-SIDED SCIATICA: Primary | ICD-10-CM

## 2019-07-31 PROCEDURE — 97530 THERAPEUTIC ACTIVITIES: CPT | Performed by: PHYSICAL THERAPIST

## 2019-07-31 PROCEDURE — 97162 PT EVAL MOD COMPLEX 30 MIN: CPT | Performed by: PHYSICAL THERAPIST

## 2019-08-08 ENCOUNTER — TREATMENT (OUTPATIENT)
Dept: PHYSICAL THERAPY | Facility: CLINIC | Age: 24
End: 2019-08-08

## 2019-08-08 DIAGNOSIS — G89.29 CHRONIC MIDLINE LOW BACK PAIN WITH LEFT-SIDED SCIATICA: Primary | ICD-10-CM

## 2019-08-08 DIAGNOSIS — M54.42 CHRONIC MIDLINE LOW BACK PAIN WITH LEFT-SIDED SCIATICA: Primary | ICD-10-CM

## 2019-08-08 DIAGNOSIS — S34.3XXD CAUDA EQUINA SPINAL CORD INJURY, SUBSEQUENT ENCOUNTER: ICD-10-CM

## 2019-08-08 DIAGNOSIS — M54.50 LUMBAR PAIN: ICD-10-CM

## 2019-08-08 PROCEDURE — 97140 MANUAL THERAPY 1/> REGIONS: CPT | Performed by: PHYSICAL THERAPIST

## 2019-08-08 PROCEDURE — 97110 THERAPEUTIC EXERCISES: CPT | Performed by: PHYSICAL THERAPIST

## 2019-08-13 ENCOUNTER — TREATMENT (OUTPATIENT)
Dept: PHYSICAL THERAPY | Facility: CLINIC | Age: 24
End: 2019-08-13

## 2019-08-13 DIAGNOSIS — M54.50 LUMBAR PAIN: ICD-10-CM

## 2019-08-13 DIAGNOSIS — G89.29 CHRONIC MIDLINE LOW BACK PAIN WITH LEFT-SIDED SCIATICA: Primary | ICD-10-CM

## 2019-08-13 DIAGNOSIS — S34.3XXD CAUDA EQUINA SPINAL CORD INJURY, SUBSEQUENT ENCOUNTER: ICD-10-CM

## 2019-08-13 DIAGNOSIS — M54.42 CHRONIC MIDLINE LOW BACK PAIN WITH LEFT-SIDED SCIATICA: Primary | ICD-10-CM

## 2019-08-13 PROCEDURE — 97112 NEUROMUSCULAR REEDUCATION: CPT | Performed by: PHYSICAL THERAPIST

## 2019-08-13 PROCEDURE — 97110 THERAPEUTIC EXERCISES: CPT | Performed by: PHYSICAL THERAPIST

## 2019-08-13 PROCEDURE — 97140 MANUAL THERAPY 1/> REGIONS: CPT | Performed by: PHYSICAL THERAPIST

## 2019-08-13 NOTE — PROGRESS NOTES
Physical Therapy Daily Progress Note        Elen Cleveland reports 3/10 pain today at rest.  Pt reports that she felt pretty good after last time saying the back felt looser and able to move easier. Pt states that she has had a headache for the last few days that has gotten worse when doing exercises. Pt states that pain shoots up into the neck and head.         Objective Pt present to PT today with no distress at rest.     Pt had some pain with quadriped hip extension. Pt had no increased pain following session.       See Exercise, Manual, and Modality Logs for complete treatment.     Assessment/Plan  Pt continues to have pain in the lumbar spine and has continued hypomobility at the L2-L3 segments. Pt scoliotic curve more evident with cat/camel exercise. Pt with very atrophied spinal extensor muscles. Pt to continue with PT to help improve core stabilization and lumbar mobility.       Progress per Plan of Care  Assess reaction to hip extension.            Manual Therapy:    25     mins  51873;  Therapeutic Exercise:    17     mins  82229;     Neuromuscular Veronica:    10    mins  89501;    Therapeutic Activity:          mins  28116;     Gait Training:        ___  mins  20048;     Ultrasound:          mins  11430;    Electrical Stimulation:         mins  55144 ( );  Dry Needling          mins self-pay    Timed Treatment:   52   mins   Total Treatment:     72   mins    Arnulfo Justice, PT  Physical Therapist

## 2019-08-20 ENCOUNTER — TREATMENT (OUTPATIENT)
Dept: PHYSICAL THERAPY | Facility: CLINIC | Age: 24
End: 2019-08-20

## 2019-08-20 DIAGNOSIS — G89.29 CHRONIC MIDLINE LOW BACK PAIN WITH LEFT-SIDED SCIATICA: Primary | ICD-10-CM

## 2019-08-20 DIAGNOSIS — M54.50 LUMBAR PAIN: ICD-10-CM

## 2019-08-20 DIAGNOSIS — S34.3XXD CAUDA EQUINA SPINAL CORD INJURY, SUBSEQUENT ENCOUNTER: ICD-10-CM

## 2019-08-20 DIAGNOSIS — M54.42 CHRONIC MIDLINE LOW BACK PAIN WITH LEFT-SIDED SCIATICA: Primary | ICD-10-CM

## 2019-08-20 PROCEDURE — 97112 NEUROMUSCULAR REEDUCATION: CPT | Performed by: PHYSICAL THERAPIST

## 2019-08-20 PROCEDURE — 97110 THERAPEUTIC EXERCISES: CPT | Performed by: PHYSICAL THERAPIST

## 2019-08-20 NOTE — PROGRESS NOTES
Physical Therapy Daily Progress Note        Elen Cleveland reports 4/10 pain today at rest.  Pt reports that she worked 60 hours this weekend and felt pretty good except for one run where she did a lot of lifting. Pt reports that she has almost gotten used to the pain in the back. Pt states that she always feels looser following PT.         Objective Pt present to PT today with no distress at rest.     Pt tolerated exercises well today with no increased pain in the lumbar spine although did get a headache.       See Exercise, Manual, and Modality Logs for complete treatment.     Assessment/Plan  Pt continues to have pain and decreased mobility in the mid thoracic spine extending into the lumbar spine. Pt would benefit from PT to help increase pt hip and lumbar strength, mobility, and function to return to pain free daily and work activities.       Progress per Plan of Care  Progress as tolerated           Manual Therapy:    24     mins  63306;  Therapeutic Exercise:    30     mins  86178;     Neuromuscular Veronica:        mins  66110;    Therapeutic Activity:          mins  72273;     Gait Training:        ___  mins  09055;     Ultrasound:          mins  20891;    Electrical Stimulation:         mins  87949 ( );  Dry Needling          mins self-pay    Timed Treatment:   54   mins   Total Treatment:     69   mins    Arnulfo Justice, PT  Physical Therapist

## 2019-08-25 DIAGNOSIS — R53.83 FATIGUE, UNSPECIFIED TYPE: ICD-10-CM

## 2019-08-25 DIAGNOSIS — F41.9 ANXIETY: ICD-10-CM

## 2019-08-26 ENCOUNTER — TREATMENT (OUTPATIENT)
Dept: PHYSICAL THERAPY | Facility: CLINIC | Age: 24
End: 2019-08-26

## 2019-08-26 DIAGNOSIS — S34.3XXD CAUDA EQUINA SPINAL CORD INJURY, SUBSEQUENT ENCOUNTER: ICD-10-CM

## 2019-08-26 DIAGNOSIS — M54.42 CHRONIC MIDLINE LOW BACK PAIN WITH LEFT-SIDED SCIATICA: Primary | ICD-10-CM

## 2019-08-26 DIAGNOSIS — G89.29 CHRONIC MIDLINE LOW BACK PAIN WITH LEFT-SIDED SCIATICA: Primary | ICD-10-CM

## 2019-08-26 DIAGNOSIS — M54.50 LUMBAR PAIN: ICD-10-CM

## 2019-08-26 PROCEDURE — 97110 THERAPEUTIC EXERCISES: CPT | Performed by: PHYSICAL THERAPIST

## 2019-08-26 PROCEDURE — 97140 MANUAL THERAPY 1/> REGIONS: CPT | Performed by: PHYSICAL THERAPIST

## 2019-08-26 NOTE — PROGRESS NOTES
Physical Therapy Daily Progress Note        Elen Cleveland reports 3-4/10 pain today at rest.  Pt reports that she continue to have headaches that she thinks may be arising from the cervical spine. Pt reports that she feels better after leaving PT but pain got much worse on Thursday and she had to work this weekend which has made her sore. Pt reports that she has also had some shocks go down both her legs at random times but no tinging or numbness in the LEs.         Objective Pt present to PT today with no distress at rest.     Pt tolerated exercises well today with no increased pain in the low back with activities.       See Exercise, Manual, and Modality Logs for complete treatment.     Assessment/Plan  Pt continues to have pain in the low back with activities at work and with sitting for long periods at school. Pt to continue with PT to help improve lumbar stability and strength to improve pain free, daily function.       Progress per Plan of Care  Progress as tolerated           Manual Therapy:    28     mins  34525;  Therapeutic Exercise:    20     mins  18983;     Neuromuscular Veronica:        mins  61718;    Therapeutic Activity:          mins  49121;     Gait Training:        ___  mins  44416;     Ultrasound:          mins  11769;    Electrical Stimulation:         mins  97341 ( );  Dry Needling          mins self-pay    Timed Treatment:   48   mins   Total Treatment:     60   mins    Arnulfo Justice, PT  Physical Therapist

## 2019-08-28 ENCOUNTER — TREATMENT (OUTPATIENT)
Dept: PHYSICAL THERAPY | Facility: CLINIC | Age: 24
End: 2019-08-28

## 2019-08-28 DIAGNOSIS — G89.29 CHRONIC MIDLINE LOW BACK PAIN WITH LEFT-SIDED SCIATICA: Primary | ICD-10-CM

## 2019-08-28 DIAGNOSIS — M54.50 LUMBAR PAIN: ICD-10-CM

## 2019-08-28 DIAGNOSIS — M54.42 CHRONIC MIDLINE LOW BACK PAIN WITH LEFT-SIDED SCIATICA: Primary | ICD-10-CM

## 2019-08-28 DIAGNOSIS — S34.3XXD CAUDA EQUINA SPINAL CORD INJURY, SUBSEQUENT ENCOUNTER: ICD-10-CM

## 2019-08-28 PROCEDURE — 97140 MANUAL THERAPY 1/> REGIONS: CPT | Performed by: PHYSICAL THERAPIST

## 2019-08-28 PROCEDURE — 97110 THERAPEUTIC EXERCISES: CPT | Performed by: PHYSICAL THERAPIST

## 2019-08-28 PROCEDURE — 97112 NEUROMUSCULAR REEDUCATION: CPT | Performed by: PHYSICAL THERAPIST

## 2019-08-28 NOTE — PROGRESS NOTES
Physical Therapy Daily Progress Note        Elen Cleveland reports 0/10 pain today at rest.  Pt states that the back feels pretty good until she starts lifting or until she stretches into extension. Pt reports that he headaches are still bad but believes they are sinus related        Objective Pt present to PT today with no distress at rest.     Pt tolerated exercises today with light Bulgarian deadlifts needing to be discontinued due to pain.       See Exercise, Manual, and Modality Logs for complete treatment.     Assessment/Plan  Pt back extensors are very weak and atrophied. Pt would benefit from PT to help improve core stability and back strength to help improve lumbar function and activity tolerance.       Progress per Plan of Care  Progress as tolerated            Manual Therapy:    17     mins  25998;  Therapeutic Exercise:    14     mins  23476;     Neuromuscular Veronica:    17    mins  14238;    Therapeutic Activity:          mins  82697;     Gait Training:        ___  mins  04102;     Ultrasound:          mins  24791;    Electrical Stimulation:         mins  47415 ( );  Dry Needling          mins self-pay    Timed Treatment:   48   mins   Total Treatment:     53   mins    Arnulfo Justice, PT  Physical Therapist

## 2019-09-04 ENCOUNTER — OFFICE VISIT (OUTPATIENT)
Dept: INTERNAL MEDICINE | Facility: CLINIC | Age: 24
End: 2019-09-04

## 2019-09-04 ENCOUNTER — TREATMENT (OUTPATIENT)
Dept: PHYSICAL THERAPY | Facility: CLINIC | Age: 24
End: 2019-09-04

## 2019-09-04 VITALS
HEIGHT: 68 IN | BODY MASS INDEX: 20.31 KG/M2 | SYSTOLIC BLOOD PRESSURE: 107 MMHG | HEART RATE: 86 BPM | WEIGHT: 134 LBS | DIASTOLIC BLOOD PRESSURE: 64 MMHG | OXYGEN SATURATION: 99 % | TEMPERATURE: 97.6 F

## 2019-09-04 DIAGNOSIS — G89.29 CHRONIC MIDLINE LOW BACK PAIN WITH LEFT-SIDED SCIATICA: Primary | ICD-10-CM

## 2019-09-04 DIAGNOSIS — M54.42 CHRONIC MIDLINE LOW BACK PAIN WITH LEFT-SIDED SCIATICA: Primary | ICD-10-CM

## 2019-09-04 DIAGNOSIS — M54.50 LUMBAR PAIN: ICD-10-CM

## 2019-09-04 DIAGNOSIS — R00.2 PALPITATIONS: Primary | ICD-10-CM

## 2019-09-04 DIAGNOSIS — S34.3XXD CAUDA EQUINA SPINAL CORD INJURY, SUBSEQUENT ENCOUNTER: ICD-10-CM

## 2019-09-04 DIAGNOSIS — M26.649 TMJ ARTHRITIS: ICD-10-CM

## 2019-09-04 DIAGNOSIS — Z20.1 EXPOSURE TO TB: ICD-10-CM

## 2019-09-04 PROCEDURE — 97140 MANUAL THERAPY 1/> REGIONS: CPT | Performed by: PHYSICAL THERAPIST

## 2019-09-04 PROCEDURE — 99213 OFFICE O/P EST LOW 20 MIN: CPT | Performed by: INTERNAL MEDICINE

## 2019-09-04 PROCEDURE — 86580 TB INTRADERMAL TEST: CPT | Performed by: INTERNAL MEDICINE

## 2019-09-04 PROCEDURE — 97112 NEUROMUSCULAR REEDUCATION: CPT | Performed by: PHYSICAL THERAPIST

## 2019-09-04 NOTE — PROGRESS NOTES
"Subjective     Patient ID: Elen Cleveland is a 24 y.o. female. Patient is here for management of multiple medical problems.     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 having trouble concentrating at school      History of Present Illness       No low bp.    Feels well.   Needs tb skin test. 2 step.    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    Takes 16 hours.   Set in a specific program.    Pt in paramedic class.        The following portions of the patient's history were reviewed and updated as appropriate: allergies, current medications, past family history, past medical history, past social history, past surgical history and problem list.    Review of Systems   Constitutional: Positive for fatigue.   Psychiatric/Behavioral: Positive for dysphoric mood and sleep disturbance. The patient is nervous/anxious.    All other systems reviewed and are negative.      Current Outpatient Medications:   •  Cholecalciferol (VITAMIN D PO), Vitamin D2 50,000 unit capsule, Disp: , Rfl:   •  Elagolix Sodium (ORILISSA) 150 MG tablet, Orilissa 150 mg tablet, Disp: , Rfl:   •  fluticasone (FLONASE) 50 MCG/ACT nasal spray, 2 sprays into the nostril(s) as directed by provider Daily. 2 puffs each nostril, Disp: 1 bottle, Rfl: 0  •  norethindrone (SHAROBEL) 0.35 MG tablet, Sharobel 0.35 mg tablet  Take 1 tablet every day by oral route., Disp: , Rfl:   •  sertraline (ZOLOFT) 50 MG tablet, TAKE 1 TABLET BY MOUTH ONE TIME A DAY , Disp: 30 tablet, Rfl: 0  •  vitamin B-12 (CYANOCOBALAMIN) 500 MCG tablet, Take 1,000 mcg by mouth Daily., Disp: , Rfl:     Objective      Blood pressure 107/64, pulse 86, temperature 97.6 °F (36.4 °C), temperature source Temporal, height 172.7 cm (68\"), weight 60.8 kg (134 lb), SpO2 99 %.    Physical Exam     General Appearance:    Alert, cooperative, no distress, appears stated age   Head:    " Normocephalic, without obvious abnormality, atraumatic   Eyes:    PERRL, conjunctiva/corneas clear, EOM's intact   Ears:    Normal TM's and external ear canals, both ears   Nose:   Nares normal, septum midline, mucosa normal, no drainage   or sinus tenderness   Throat:   Lips, mucosa, and tongue normal; teeth and gums normal   Neck:   Supple, symmetrical, trachea midline, no adenopathy;        thyroid:  No enlargement/tenderness/nodules; no carotid    bruit or JVD   Back:     Symmetric, no curvature, ROM normal, no CVA tenderness   Lungs:     Clear to auscultation bilaterally, respirations unlabored   Chest wall:    No tenderness or deformity   Heart:    Regular rate and rhythm, S1 and S2 normal, no murmur,        rub or gallop   Abdomen:     Soft, non-tender, bowel sounds active all four quadrants,     no masses, no organomegaly   Extremities:   Extremities normal, atraumatic, no cyanosis or edema   Pulses:   2+ and symmetric all extremities   Skin:   Skin color, texture, turgor normal, no rashes or lesions   Lymph nodes:   Cervical, supraclavicular, and axillary nodes normal   Neurologic:   CNII-XII intact. Normal strength, sensation and reflexes       throughout      Results for orders placed or performed in visit on 07/12/19   Urine Culture - Urine, Urine, Clean Catch   Result Value Ref Range    Urine Culture Final report     Result 1 No growth    Microscopic Examination -   Result Value Ref Range    WBC, UA 6-12 (A) None Seen /HPF    RBC, UA Comment None Seen /HPF    Epithelial Cells (non renal) 0-2 /HPF    Cast Type Comment     Mucus, UA Trace /HPF    Bacteria, UA Trace (A) None Seen /HPF   Urinalysis With Microscopic - Urine, Clean Catch   Result Value Ref Range    Specific Gravity, UA 1.019 1.005 - 1.030    pH, UA 6.5 5.0 - 8.0    Color, UA Yellow     Appearance, UA Clear Clear    Leukocytes, UA See below: (A) Negative    Protein Negative Negative    Glucose, UA Negative Negative    Ketones Negative Negative     Blood, UA Negative Negative    Bilirubin, UA Negative Negative    Urobilinogen, UA Comment     Nitrite, UA Negative Negative         Assessment/Plan   Test taking skills discussed.  Sleep Hydration.    tmj problems. 0.25 klonpin.     Will change to pain gel. rx sent.        Elen was seen today for hypotension.    Diagnoses and all orders for this visit:    Palpitations    Exposure to TB  -     TB Skin Test    TMJ arthritis    repeat tb skin test 7 days.    Return in about 1 year (around 9/4/2020).          There are no Patient Instructions on file for this visit.     Bentley Langston MD    Assessment/Plan

## 2019-09-06 NOTE — PROGRESS NOTES
Physical Therapy Daily Progress Note        Elen Cleveland reports 6/10 pain today at rest.  Pt reports that she is hurting pretty bad today due to having to lift a pt into their house yesterday and barely having any rest the night before. Pt states that lifting pts, despite using proper form, hurts her back.         Objective Pt present to PT today with no distress at rest.     Pt was limited due to pain today with strengthening exercises.       See Exercise, Manual, and Modality Logs for complete treatment.     Assessment/Plan  Pt continues to have pain in the lumbar spine. Pt did have some relief with approximation sacroiliac joint and was provided with a sacral belt. Pt to continue strengthening of lumbar spine and core to help improve pain free function and activity tolerance.       Progress per Plan of Care  Assess sacral belt           Manual Therapy:    16     mins  64118;  Therapeutic Exercise:         mins  91067;     Neuromuscular Veronica:    26    mins  63000;    Therapeutic Activity:          mins  04375;     Gait Training:        ___  mins  11786;     Ultrasound:          mins  08896;    Electrical Stimulation:         mins  51060 ( );  Dry Needling          mins self-pay    Timed Treatment:   42   mins   Total Treatment:     54   mins    Arnulfo Justice, PT  Physical Therapist

## 2019-09-07 ENCOUNTER — CLINICAL SUPPORT (OUTPATIENT)
Dept: INTERNAL MEDICINE | Facility: CLINIC | Age: 24
End: 2019-09-07

## 2019-09-07 LAB
INDURATION: 0 MM (ref 0–10)
Lab: NORMAL
Lab: NORMAL
TB SKIN TEST: NEGATIVE

## 2019-09-10 ENCOUNTER — TREATMENT (OUTPATIENT)
Dept: PHYSICAL THERAPY | Facility: CLINIC | Age: 24
End: 2019-09-10

## 2019-09-10 DIAGNOSIS — G89.29 CHRONIC MIDLINE LOW BACK PAIN WITH LEFT-SIDED SCIATICA: Primary | ICD-10-CM

## 2019-09-10 DIAGNOSIS — M54.42 CHRONIC MIDLINE LOW BACK PAIN WITH LEFT-SIDED SCIATICA: Primary | ICD-10-CM

## 2019-09-10 DIAGNOSIS — S34.3XXD CAUDA EQUINA SPINAL CORD INJURY, SUBSEQUENT ENCOUNTER: ICD-10-CM

## 2019-09-10 DIAGNOSIS — M54.50 LUMBAR PAIN: ICD-10-CM

## 2019-09-10 PROCEDURE — 97110 THERAPEUTIC EXERCISES: CPT | Performed by: PHYSICAL THERAPIST

## 2019-09-10 PROCEDURE — 97140 MANUAL THERAPY 1/> REGIONS: CPT | Performed by: PHYSICAL THERAPIST

## 2019-09-10 PROCEDURE — 97112 NEUROMUSCULAR REEDUCATION: CPT | Performed by: PHYSICAL THERAPIST

## 2019-09-10 NOTE — PROGRESS NOTES
Physical Therapy Daily Progress Note        Elen Cleveland reports 6-7/10 pain today at rest.  Pt reports that the sacral belt has helped her back feel a little better although she is hurting bad today from work yesterday. Pt reports that she did not do anything out of the ordinary. Pt reports that she always feels better after PT for about a day then the pain returns.         Objective Pt present to PT today with no distress at rest.     Pt tolerated exercises well today with 2/10 pain upon leaving session today.       See Exercise, Manual, and Modality Logs for complete treatment.     Assessment/Plan  Pt to begin focusing on core and transverse abdominus strengthening to help stabilize the lumbar. Pt would benefit from core strengthening and unloading activities to help improve lumbar pain and activity tolerance.       Progress per Plan of Care  Progress as tolerated           Manual Therapy:    19     mins  97150;  Therapeutic Exercise:     12    mins  90684;     Neuromuscular Veronica:   20     mins  96628;    Therapeutic Activity:          mins  94691;     Gait Training:        ___  mins  00247;     Ultrasound:          mins  73362;    Electrical Stimulation:         mins  49857 ( );  Dry Needling          mins self-pay    Timed Treatment:   51   mins   Total Treatment:     62   mins    Arnulfo Justice, PT  Physical Therapist

## 2020-05-27 ENCOUNTER — OFFICE VISIT (OUTPATIENT)
Dept: INTERNAL MEDICINE | Facility: CLINIC | Age: 25
End: 2020-05-27

## 2020-05-27 VITALS
TEMPERATURE: 98.3 F | HEIGHT: 68 IN | DIASTOLIC BLOOD PRESSURE: 66 MMHG | HEART RATE: 93 BPM | BODY MASS INDEX: 21.22 KG/M2 | OXYGEN SATURATION: 98 % | WEIGHT: 140 LBS | SYSTOLIC BLOOD PRESSURE: 104 MMHG | RESPIRATION RATE: 16 BRPM

## 2020-05-27 DIAGNOSIS — F41.9 ANXIETY: ICD-10-CM

## 2020-05-27 DIAGNOSIS — M54.16 LUMBAR RADICULOPATHY: ICD-10-CM

## 2020-05-27 DIAGNOSIS — M51.26 LUMBAR DISC HERNIATION: Primary | ICD-10-CM

## 2020-05-27 PROCEDURE — 99214 OFFICE O/P EST MOD 30 MIN: CPT | Performed by: NURSE PRACTITIONER

## 2020-05-27 RX ORDER — METAXALONE 800 MG/1
800 TABLET ORAL 3 TIMES DAILY PRN
Qty: 20 TABLET | Refills: 0 | Status: SHIPPED | OUTPATIENT
Start: 2020-05-27 | End: 2020-10-05

## 2020-05-27 RX ORDER — NAPROXEN 500 MG/1
TABLET ORAL
COMMUNITY
Start: 2020-05-23 | End: 2022-07-11

## 2020-05-27 RX ORDER — CYCLOBENZAPRINE HCL 10 MG
TABLET ORAL
COMMUNITY
Start: 2020-05-23 | End: 2020-05-27

## 2020-05-27 RX ORDER — ESCITALOPRAM OXALATE 10 MG/1
TABLET ORAL
COMMUNITY
Start: 2020-05-15 | End: 2020-10-05

## 2020-05-28 RX ORDER — BACLOFEN 10 MG/1
10 TABLET ORAL 3 TIMES DAILY
Qty: 90 TABLET | Refills: 0 | Status: SHIPPED | OUTPATIENT
Start: 2020-05-28 | End: 2020-06-29

## 2020-06-29 RX ORDER — BACLOFEN 10 MG/1
TABLET ORAL
Qty: 90 TABLET | Refills: 1 | Status: SHIPPED | OUTPATIENT
Start: 2020-06-29 | End: 2022-07-11

## 2020-08-04 ENCOUNTER — TELEPHONE (OUTPATIENT)
Dept: INTERNAL MEDICINE | Facility: CLINIC | Age: 25
End: 2020-08-04

## 2020-08-04 NOTE — TELEPHONE ENCOUNTER
Patient states that she has a new PT and would like the order faxed to [facility] Fax No [phone].  She can be reached at [phone]

## 2020-10-05 ENCOUNTER — OFFICE VISIT (OUTPATIENT)
Dept: INTERNAL MEDICINE | Facility: CLINIC | Age: 25
End: 2020-10-05

## 2020-10-05 VITALS
HEART RATE: 85 BPM | WEIGHT: 145 LBS | SYSTOLIC BLOOD PRESSURE: 112 MMHG | HEIGHT: 68 IN | TEMPERATURE: 97.8 F | OXYGEN SATURATION: 100 % | BODY MASS INDEX: 21.98 KG/M2 | DIASTOLIC BLOOD PRESSURE: 63 MMHG | RESPIRATION RATE: 15 BRPM

## 2020-10-05 DIAGNOSIS — R10.84 GENERALIZED ABDOMINAL PAIN: ICD-10-CM

## 2020-10-05 DIAGNOSIS — Z83.3 FAMILY HISTORY OF DIABETES MELLITUS: ICD-10-CM

## 2020-10-05 DIAGNOSIS — E53.8 B12 DEFICIENCY: ICD-10-CM

## 2020-10-05 DIAGNOSIS — F41.9 ANXIETY: ICD-10-CM

## 2020-10-05 DIAGNOSIS — R53.83 FATIGUE, UNSPECIFIED TYPE: ICD-10-CM

## 2020-10-05 DIAGNOSIS — E55.9 VITAMIN D INSUFFICIENCY: ICD-10-CM

## 2020-10-05 DIAGNOSIS — G47.9 SLEEPING DIFFICULTIES: ICD-10-CM

## 2020-10-05 DIAGNOSIS — R11.0 NAUSEA: ICD-10-CM

## 2020-10-05 DIAGNOSIS — Z23 NEED FOR IMMUNIZATION AGAINST INFLUENZA: ICD-10-CM

## 2020-10-05 DIAGNOSIS — Z00.00 PHYSICAL EXAM, ANNUAL: Primary | ICD-10-CM

## 2020-10-05 PROBLEM — N80.9 ENDOMETRIOSIS: Status: ACTIVE | Noted: 2019-06-03

## 2020-10-05 PROCEDURE — 90686 IIV4 VACC NO PRSV 0.5 ML IM: CPT | Performed by: NURSE PRACTITIONER

## 2020-10-05 PROCEDURE — 99395 PREV VISIT EST AGE 18-39: CPT | Performed by: NURSE PRACTITIONER

## 2020-10-05 PROCEDURE — 90471 IMMUNIZATION ADMIN: CPT | Performed by: NURSE PRACTITIONER

## 2020-10-05 RX ORDER — ESCITALOPRAM OXALATE 20 MG/1
20 TABLET ORAL DAILY
Qty: 30 TABLET | Refills: 1 | Status: SHIPPED | OUTPATIENT
Start: 2020-10-05 | End: 2020-12-10 | Stop reason: SDUPTHER

## 2020-10-05 RX ORDER — HYDROXYZINE HYDROCHLORIDE 25 MG/1
25 TABLET, FILM COATED ORAL NIGHTLY PRN
Qty: 90 TABLET | Refills: 0 | Status: SHIPPED | OUTPATIENT
Start: 2020-10-05 | End: 2022-07-11

## 2020-10-05 RX ORDER — ONDANSETRON 4 MG/1
4 TABLET, ORALLY DISINTEGRATING ORAL EVERY 8 HOURS PRN
Qty: 15 TABLET | Refills: 1 | Status: SHIPPED | OUTPATIENT
Start: 2020-10-05
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
## 2020-10-18 ENCOUNTER — APPOINTMENT (OUTPATIENT)
Dept: CT IMAGING | Facility: HOSPITAL | Age: 25
End: 2020-10-18

## 2020-10-18 ENCOUNTER — APPOINTMENT (OUTPATIENT)
Dept: GENERAL RADIOLOGY | Facility: HOSPITAL | Age: 25
End: 2020-10-18

## 2020-10-18 ENCOUNTER — HOSPITAL ENCOUNTER (EMERGENCY)
Facility: HOSPITAL | Age: 25
Discharge: HOME | End: 2020-10-18
Attending: STUDENT IN AN ORGANIZED HEALTH CARE EDUCATION/TRAINING PROGRAM | Admitting: STUDENT IN AN ORGANIZED HEALTH CARE EDUCATION/TRAINING PROGRAM

## 2020-10-18 VITALS
DIASTOLIC BLOOD PRESSURE: 70 MMHG | TEMPERATURE: 98.5 F | SYSTOLIC BLOOD PRESSURE: 125 MMHG | HEIGHT: 68 IN | HEART RATE: 98 BPM | OXYGEN SATURATION: 99 % | WEIGHT: 153.2 LBS | RESPIRATION RATE: 18 BRPM | BODY MASS INDEX: 23.22 KG/M2

## 2020-10-18 DIAGNOSIS — S43.101A AC SEPARATION, RIGHT, INITIAL ENCOUNTER: Primary | ICD-10-CM

## 2020-10-18 LAB — HCG SERPL QL: NEGATIVE

## 2020-10-18 PROCEDURE — 84703 CHORIONIC GONADOTROPIN ASSAY: CPT | Performed by: PHYSICIAN ASSISTANT

## 2020-10-18 PROCEDURE — 25010000002 KETOROLAC TROMETHAMINE PER 15 MG: Performed by: PHYSICIAN ASSISTANT

## 2020-10-18 PROCEDURE — 96372 THER/PROPH/DIAG INJ SC/IM: CPT

## 2020-10-18 PROCEDURE — 73030 X-RAY EXAM OF SHOULDER: CPT

## 2020-10-18 PROCEDURE — 70450 CT HEAD/BRAIN W/O DYE: CPT

## 2020-10-18 PROCEDURE — 99283 EMERGENCY DEPT VISIT LOW MDM: CPT

## 2020-10-18 PROCEDURE — 36415 COLL VENOUS BLD VENIPUNCTURE: CPT

## 2020-10-18 RX ORDER — TIZANIDINE HYDROCHLORIDE 4 MG/1
4 CAPSULE, GELATIN COATED ORAL 3 TIMES DAILY
Qty: 30 CAPSULE | Refills: 0 | Status: SHIPPED | OUTPATIENT
Start: 2020-10-18 | End: 2022-07-11

## 2020-10-18 RX ORDER — KETOROLAC TROMETHAMINE 30 MG/ML
60 INJECTION, SOLUTION INTRAMUSCULAR; INTRAVENOUS ONCE
Status: COMPLETED | OUTPATIENT
Start: 2020-10-18 | End: 2020-10-18

## 2020-10-18 RX ORDER — DICLOFENAC SODIUM 75 MG/1
75 TABLET, DELAYED RELEASE ORAL 2 TIMES DAILY
Qty: 20 TABLET | Refills: 0 | Status: SHIPPED | OUTPATIENT
Start: 2020-10-18 | End: 2022-07-11

## 2020-10-18 RX ORDER — HYDROCODONE BITARTRATE AND ACETAMINOPHEN 7.5; 325 MG/1; MG/1
1 TABLET ORAL ONCE
Status: COMPLETED | OUTPATIENT
Start: 2020-10-18 | End: 2020-10-18

## 2020-10-18 RX ADMIN — HYDROCODONE BITARTRATE AND ACETAMINOPHEN 1 TABLET: 7.5; 325 TABLET ORAL at 22:53

## 2020-10-18 RX ADMIN — KETOROLAC TROMETHAMINE 60 MG: 60 INJECTION, SOLUTION INTRAMUSCULAR at 21:21

## 2020-10-19 ENCOUNTER — TRANSCRIBE ORDERS (OUTPATIENT)
Dept: PHYSICAL THERAPY | Facility: CLINIC | Age: 25
End: 2020-10-19

## 2020-10-19 DIAGNOSIS — S43.101A SEPARATION OF RIGHT ACROMIOCLAVICULAR JOINT, INITIAL ENCOUNTER: Primary | ICD-10-CM

## 2020-10-19 NOTE — ED PROVIDER NOTES
Subjective     History provided by:  Patient  Fall  Mechanism of injury: fall    Injury location:  Head/neck and shoulder/arm  Head/neck injury location:  Head  Shoulder/arm injury location:  R shoulder  Incident location:  Work  Time since incident:  1 hour  Arrived directly from scene: yes    Fall:     Fall occurred:  From a stool    Impact surface:  Hard floor    Point of impact:  Head  Suspicion of alcohol use: no    Suspicion of drug use: no    Prior to arrival data:     Bystander interventions:  None    Loss of consciousness: no      Amnesic to event: no      Immobilization:  None  Associated symptoms: headaches    Associated symptoms: no abdominal pain and no chest pain        Review of Systems   Constitutional: Negative.  Negative for fever.   Respiratory: Negative.    Cardiovascular: Negative.  Negative for chest pain.   Gastrointestinal: Negative.  Negative for abdominal pain.   Endocrine: Negative.    Genitourinary: Negative.  Negative for dysuria.   Musculoskeletal: Positive for arthralgias.   Skin: Negative.    Neurological: Positive for headaches.   Psychiatric/Behavioral: Negative.    All other systems reviewed and are negative.      Past Medical History:   Diagnosis Date   • Allergic    • Anemia    • Anxiety    • Chest pain    • Chronic headaches 2016   • Collapse    • Depression    • Heart murmur    • Hematuria    • Pneumonia 2016   • Scoliosis        Allergies   Allergen Reactions   • Shellfish Allergy Anaphylaxis   • Penicillins Hives   • Iodine Rash       Past Surgical History:   Procedure Laterality Date   • ADENOIDECTOMY     •  SECTION     • CYSTOSCOPY     • CYSTOSCOPY     • MOUTH SURGERY      wisdom teeth   • TONSILLECTOMY         Family History   Problem Relation Age of Onset   • Heart disease Mother    • Hypertension Mother    • Hyperlipidemia Mother    • Heart disease Father    • Hypertension Father    • Hyperlipidemia Father    • Liver disease Father    • Heart attack  Father    • Arthritis Maternal Grandmother    • Cancer Maternal Grandmother    • Kidney disease Maternal Grandmother    • Stroke Maternal Grandmother    • Cancer Paternal Grandmother    • Cancer Paternal Grandfather    • Stroke Paternal Grandfather    • Arthritis Paternal Grandfather    • Diabetes Paternal Grandfather    • Heart attack Paternal Grandfather        Social History     Socioeconomic History   • Marital status: Single     Spouse name: Not on file   • Number of children: Not on file   • Years of education: Not on file   • Highest education level: Not on file   Tobacco Use   • Smoking status: Never Smoker   • Smokeless tobacco: Never Used   Substance and Sexual Activity   • Alcohol use: No     Frequency: Never   • Drug use: No   • Sexual activity: Defer   Social History Narrative    Lives with her parents           Objective   Physical Exam  Vitals signs and nursing note reviewed.   Constitutional:       General: She is not in acute distress.     Appearance: She is well-developed. She is not diaphoretic.   HENT:      Head: Normocephalic and atraumatic.      Right Ear: External ear normal.      Left Ear: External ear normal.      Nose: Nose normal.   Eyes:      Extraocular Movements: Extraocular movements intact.      Conjunctiva/sclera: Conjunctivae normal.      Pupils: Pupils are equal, round, and reactive to light.   Neck:      Musculoskeletal: Normal range of motion and neck supple.      Vascular: No JVD.      Trachea: No tracheal deviation.   Cardiovascular:      Rate and Rhythm: Normal rate and regular rhythm.      Heart sounds: Normal heart sounds. No murmur.   Pulmonary:      Effort: Pulmonary effort is normal. No respiratory distress.      Breath sounds: Normal breath sounds. No wheezing.   Abdominal:      General: Bowel sounds are normal.      Palpations: Abdomen is soft.      Tenderness: There is no abdominal tenderness.   Musculoskeletal:         General: Tenderness and signs of injury present.  No deformity.      Comments: Very limited ROM to right UE.  Tenderness to palpation.  Pain with abduction and adduction.    Skin:     General: Skin is warm and dry.      Coloration: Skin is not pale.      Findings: No erythema or rash.   Neurological:      Mental Status: She is alert and oriented to person, place, and time.      Cranial Nerves: No cranial nerve deficit.   Psychiatric:         Behavior: Behavior normal.         Thought Content: Thought content normal.         Procedures           ED Course  ED Course as of Oct 18 2246   Sun Oct 18, 2020   2233 CT head rad interpreted:  No acute intracranial abnormality    [RB]      ED Course User Index  [RB] Rohit Handy II, PA                                           MDM  Number of Diagnoses or Management Options  AC separation, right, initial encounter: new and requires workup     Amount and/or Complexity of Data Reviewed  Clinical lab tests: ordered and reviewed  Tests in the radiology section of CPT®: ordered and reviewed  Independent visualization of images, tracings, or specimens: yes    Risk of Complications, Morbidity, and/or Mortality  Presenting problems: low  Diagnostic procedures: low  Management options: low    Patient Progress  Patient progress: stable      Final diagnoses:   AC separation, right, initial encounter            Rohit Handy II, PA  10/18/20 1129

## 2020-10-21 ENCOUNTER — TREATMENT (OUTPATIENT)
Dept: PHYSICAL THERAPY | Facility: CLINIC | Age: 25
End: 2020-10-21

## 2020-10-21 DIAGNOSIS — S46.911D STRAIN OF ACROMIOCLAVICULAR JOINT, RIGHT, SUBSEQUENT ENCOUNTER: Primary | ICD-10-CM

## 2020-10-21 DIAGNOSIS — M25.511 ACUTE PAIN OF RIGHT SHOULDER: ICD-10-CM

## 2020-10-21 LAB
25(OH)D3+25(OH)D2 SERPL-MCNC: 23.8 NG/ML (ref 30–100)
ALBUMIN SERPL-MCNC: 4.4 G/DL (ref 3.9–5)
ALBUMIN/GLOB SERPL: 1.8 {RATIO} (ref 1.2–2.2)
ALP SERPL-CCNC: 79 IU/L (ref 39–117)
ALT SERPL-CCNC: 7 IU/L (ref 0–32)
AMYLASE SERPL-CCNC: 61 U/L (ref 31–110)
AST SERPL-CCNC: 14 IU/L (ref 0–40)
BASOPHILS # BLD AUTO: 0 X10E3/UL (ref 0–0.2)
BASOPHILS NFR BLD AUTO: 0 %
BILIRUB SERPL-MCNC: 0.5 MG/DL (ref 0–1.2)
BUN SERPL-MCNC: 11 MG/DL (ref 6–20)
BUN/CREAT SERPL: 14 (ref 9–23)
CALCIUM SERPL-MCNC: 9.3 MG/DL (ref 8.7–10.2)
CHLORIDE SERPL-SCNC: 105 MMOL/L (ref 96–106)
CHOLEST SERPL-MCNC: 163 MG/DL (ref 100–199)
CO2 SERPL-SCNC: 25 MMOL/L (ref 20–29)
CREAT SERPL-MCNC: 0.78 MG/DL (ref 0.57–1)
EOSINOPHIL # BLD AUTO: 0.1 X10E3/UL (ref 0–0.4)
EOSINOPHIL NFR BLD AUTO: 3 %
ERYTHROCYTE [DISTWIDTH] IN BLOOD BY AUTOMATED COUNT: 11.7 % (ref 11.7–15.4)
FERRITIN SERPL-MCNC: 19 NG/ML (ref 15–150)
GLOBULIN SER CALC-MCNC: 2.4 G/DL (ref 1.5–4.5)
GLUCOSE SERPL-MCNC: 118 MG/DL (ref 65–99)
H PYLORI IGA SER-ACNC: <9 UNITS (ref 0–8.9)
H PYLORI IGG SER IA-ACNC: 0.5 INDEX VALUE (ref 0–0.79)
H PYLORI IGM SER-ACNC: 12.8 UNITS (ref 0–8.9)
HBA1C MFR BLD: 5.2 % (ref 4.8–5.6)
HCT VFR BLD AUTO: 39.1 % (ref 34–46.6)
HDLC SERPL-MCNC: 47 MG/DL
HGB BLD-MCNC: 13.1 G/DL (ref 11.1–15.9)
IMM GRANULOCYTES # BLD AUTO: 0 X10E3/UL (ref 0–0.1)
IMM GRANULOCYTES NFR BLD AUTO: 1 %
IRON SATN MFR SERPL: 45 % (ref 15–55)
IRON SERPL-MCNC: 151 UG/DL (ref 27–159)
LDLC SERPL CALC-MCNC: 104 MG/DL (ref 0–99)
LIPASE SERPL-CCNC: 27 U/L (ref 14–72)
LYMPHOCYTES # BLD AUTO: 1.5 X10E3/UL (ref 0.7–3.1)
LYMPHOCYTES NFR BLD AUTO: 29 %
MCH RBC QN AUTO: 30.1 PG (ref 26.6–33)
MCHC RBC AUTO-ENTMCNC: 33.5 G/DL (ref 31.5–35.7)
MCV RBC AUTO: 90 FL (ref 79–97)
MONOCYTES # BLD AUTO: 0.3 X10E3/UL (ref 0.1–0.9)
MONOCYTES NFR BLD AUTO: 6 %
NEUTROPHILS # BLD AUTO: 3.2 X10E3/UL (ref 1.4–7)
NEUTROPHILS NFR BLD AUTO: 61 %
PLATELET # BLD AUTO: 252 X10E3/UL (ref 150–450)
POTASSIUM SERPL-SCNC: 3.8 MMOL/L (ref 3.5–5.2)
PROT SERPL-MCNC: 6.8 G/DL (ref 6–8.5)
RBC # BLD AUTO: 4.35 X10E6/UL (ref 3.77–5.28)
SODIUM SERPL-SCNC: 141 MMOL/L (ref 134–144)
T4 FREE SERPL-MCNC: 1.07 NG/DL (ref 0.82–1.77)
TIBC SERPL-MCNC: 337 UG/DL (ref 250–450)
TRIGL SERPL-MCNC: 59 MG/DL (ref 0–149)
TSH SERPL DL<=0.005 MIU/L-ACNC: 0.88 UIU/ML (ref 0.45–4.5)
UIBC SERPL-MCNC: 186 UG/DL (ref 131–425)
VIT B12 SERPL-MCNC: 785 PG/ML (ref 232–1245)
VLDLC SERPL CALC-MCNC: 12 MG/DL (ref 5–40)
WBC # BLD AUTO: 5.3 X10E3/UL (ref 3.4–10.8)

## 2020-10-21 PROCEDURE — A9999 DME SUPPLY OR ACCESSORY, NOS: HCPCS | Performed by: PHYSICAL THERAPIST

## 2020-10-21 PROCEDURE — 97161 PT EVAL LOW COMPLEX 20 MIN: CPT | Performed by: PHYSICAL THERAPIST

## 2020-10-21 PROCEDURE — 97110 THERAPEUTIC EXERCISES: CPT | Performed by: PHYSICAL THERAPIST

## 2020-10-21 NOTE — PROGRESS NOTES
Physical Therapy Initial Evaluation and Plan of Care      Patient: Elen Cleveland   : 1995  Diagnosis/ICD-10 Code:  Arthralgia of right acromioclavicular joint [M25.511]  Referring practitioner: Ilsa Hoskins*    Subjective Evaluation    History of Present Illness  Date of onset: 10/18/2020  Mechanism of injury: Pt reports falling on R shoulder at work on  and having subsequent AC joint sprain. Pt reports abduction and overhead movement causes shoulder pain. Pt states that support and rest helps relieve the pain.       Patient Occupation: EMS Pain  Current pain ratin  At best pain ratin  At worst pain ratin  Quality: burning and sharp  Relieving factors: rest and support  Aggravating factors: movement, outstretched reach and overhead activity    Social Support  Lives with: young children    Hand dominance: right    Diagnostic Tests  X-ray: abnormal    Patient Goals  Patient goals for therapy: decreased pain, increased motion, increased strength and return to work             Objective          Tenderness     Right Shoulder  Tenderness in the AC joint.     Active Range of Motion     Right Shoulder   Flexion: 90 degrees     Passive Range of Motion     Right Shoulder   Flexion: 120 degrees   External rotation 90°: 80 degrees   Internal rotation 90°: 80 degrees     Tests     Right Shoulder   Positive AC shear.           Assessment & Plan     Assessment  Impairments: abnormal or restricted ROM, activity intolerance, impaired physical strength, lacks appropriate home exercise program and pain with function  Assessment details: Pt is a 25 year old female that presents to PT with AC join strain after falling on shoulder. Pt with tenderness over AC joint with step off sign noted in R shoulder compared to L. Pt with decreased strength and motion due to pain. Pt would benefit from PT to help improve the above deficits.   Prognosis: good  Prognosis details: Short term Goals (2  weeks)  1. Pt will demonstrate independence with HEP.2  2. Pt will have full PROM of the R shoulder without pain  3. Pt will increase AROM of the R shoulder in flexion to 120 degrees.     Long term Goals (6 weeks)  1. Pt will have full AROM with no greater than 1/10 pain  2. Pt will be able to lift 40 lbs without pain in the R shoulder  3. Pt will present with 4+/5 strength in the R shoulder with flexion, abduction, ER, and IR  Functional Limitations: carrying objects, lifting, sleeping, pushing, uncomfortable because of pain, moving in bed, reaching behind back and reaching overhead  Plan  Therapy options: will be seen for skilled physical therapy services  Planned modality interventions: cryotherapy and thermotherapy (hydrocollator packs)  Planned therapy interventions: body mechanics training, fine motor coordination training, flexibility, gait training, home exercise program, joint mobilization, manual therapy, motor coordination training, neuromuscular re-education, postural training, soft tissue mobilization, spinal/joint mobilization, strengthening, stretching and therapeutic activities  Frequency: 2x week  Treatment plan discussed with: patient  Plan details: Pt to be seen 1-2x per week for 6-8 weeks        Manual Therapy:         mins  14766;  Therapeutic Exercise:    14     mins  47826;     Neuromuscular Veronica:        mins  80689;    Therapeutic Activity:          mins  61100;     Gait Training:           mins  96299;     Ultrasound:          mins  52950;    Electrical Stimulation:         mins  60311 ( );  Dry Needling          mins self-pay    Timed Treatment:  14    mins   Total Treatment:     50   mins    PT SIGNATURE: Arnulfo Justice PT   DATE TREATMENT INITIATED: 10/21/2020    Initial Certification  Certification Period: 1/19/2021  I certify that the therapy services are furnished while this patient is under my care.  The services outlined above are required by this patient, and will be reviewed  every 90 days.     PHYSICIAN: Ilsa Hoskins, APRN      DATE:     Please sign and return via fax to [phone].. Thank you, [facility].

## 2020-10-22 ENCOUNTER — HOSPITAL ENCOUNTER (OUTPATIENT)
Dept: MRI IMAGING | Facility: HOSPITAL | Age: 25
End: 2020-10-22

## 2020-10-22 ENCOUNTER — TRANSCRIBE ORDERS (OUTPATIENT)
Dept: ADMINISTRATIVE | Facility: HOSPITAL | Age: 25
End: 2020-10-22

## 2020-10-22 DIAGNOSIS — R10.84 GENERALIZED ABDOMINAL PAIN: Primary | ICD-10-CM

## 2020-10-22 DIAGNOSIS — S43.101A ACROMIOCLAVICULAR JOINT SEPARATION, RIGHT, INITIAL ENCOUNTER: Primary | ICD-10-CM

## 2020-10-22 DIAGNOSIS — R76.8 HELICOBACTER PYLORI ANTIBODY POSITIVE: ICD-10-CM

## 2020-10-26 ENCOUNTER — APPOINTMENT (OUTPATIENT)
Dept: MRI IMAGING | Facility: HOSPITAL | Age: 25
End: 2020-10-26

## 2020-10-27 ENCOUNTER — HOSPITAL ENCOUNTER (OUTPATIENT)
Dept: MRI IMAGING | Facility: HOSPITAL | Age: 25
Discharge: HOME | End: 2020-10-27
Admitting: NURSE PRACTITIONER

## 2020-10-27 DIAGNOSIS — J20.9 ACUTE BRONCHITIS, UNSPECIFIED ORGANISM: ICD-10-CM

## 2020-10-27 DIAGNOSIS — S43.101A ACROMIOCLAVICULAR JOINT SEPARATION, RIGHT, INITIAL ENCOUNTER: ICD-10-CM

## 2020-10-27 PROCEDURE — 73221 MRI JOINT UPR EXTREM W/O DYE: CPT

## 2020-10-27 RX ORDER — ALBUTEROL SULFATE 90 UG/1
AEROSOL, METERED RESPIRATORY (INHALATION)
Qty: 6.7 G | Refills: 0 | OUTPATIENT
Start: 2020-10-27

## 2020-10-28 ENCOUNTER — TREATMENT (OUTPATIENT)
Dept: PHYSICAL THERAPY | Facility: CLINIC | Age: 25
End: 2020-10-28

## 2020-10-28 DIAGNOSIS — S46.911D STRAIN OF ACROMIOCLAVICULAR JOINT, RIGHT, SUBSEQUENT ENCOUNTER: Primary | ICD-10-CM

## 2020-10-28 DIAGNOSIS — M25.511 ACUTE PAIN OF RIGHT SHOULDER: ICD-10-CM

## 2020-10-28 PROCEDURE — 97110 THERAPEUTIC EXERCISES: CPT | Performed by: PHYSICAL THERAPIST

## 2020-10-28 PROCEDURE — 97140 MANUAL THERAPY 1/> REGIONS: CPT | Performed by: PHYSICAL THERAPIST

## 2020-10-28 PROCEDURE — 97033 APP MDLTY 1+IONTPHRSIS EA 15: CPT | Performed by: PHYSICAL THERAPIST

## 2020-10-28 NOTE — PROGRESS NOTES
Physical Therapy Daily Progress Note      Visit #: 2    Elen Cleveland reports 3-4 /10 pain today at rest.  Pt reports that she has been working on mobility exercises at the house although still has limited motion. Pt reports getting an MRI yesterday. No tears or lesions noted with MRI per report.         Objective Pt present to PT today with no distress at rest.     Pt with moderate tenderness over R AC joint.     Pt tolerated activities well today although R abduction caused pain when AROM performed.       See Exercise, Manual, and Modality Logs for complete treatment.     Assessment/Plan  Pt continues to have pain with overhead movements and end range rotations below shoulder. Pt would benefit from PT to help increase R shoulder mobility, strength, and stability.       Progress per Plan of Care  Progress as tolerated    Visit Diagnosis:    ICD-10-CM ICD-9-CM   1. Strain of acromioclavicular joint, right, subsequent encounter  S46.911D V58.89     840.0   2. Acute pain of right shoulder  M25.511 719.41            Manual Therapy:    12     mins  06627;  Therapeutic Exercise:    25     mins  40448;     Neuromuscular Veronica:        mins  70614;    Therapeutic Activity:          mins  95547;     Gait Training:           mins  92734;     Ultrasound:          mins  81893;    Electrical Stimulation:         mins  78800 ( );  Dry Needling          mins self-pay  Iontophoresis    15      mins [id number]      Timed Treatment:   52   mins   Total Treatment:     56   mins    Arnulfo Justice, PT  Physical Therapist

## 2020-11-04 ENCOUNTER — TELEPHONE (OUTPATIENT)
Dept: ORTHOPEDICS | Facility: OTHER | Age: 25
End: 2020-11-04

## 2020-11-04 ENCOUNTER — OFFICE VISIT (OUTPATIENT)
Dept: PSYCHIATRY | Facility: CLINIC | Age: 25
End: 2020-11-04

## 2020-11-04 DIAGNOSIS — F43.89 OTHER REACTIONS TO SEVERE STRESS: ICD-10-CM

## 2020-11-04 DIAGNOSIS — F33.0 MILD EPISODE OF RECURRENT MAJOR DEPRESSIVE DISORDER (HCC): Primary | ICD-10-CM

## 2020-11-04 PROCEDURE — 90791 PSYCH DIAGNOSTIC EVALUATION: CPT | Performed by: SOCIAL WORKER
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
## 2020-12-10 DIAGNOSIS — F41.9 ANXIETY: ICD-10-CM

## 2020-12-10 RX ORDER — ESCITALOPRAM OXALATE 20 MG/1
20 TABLET ORAL DAILY
Qty: 90 TABLET | Refills: 3 | Status: SHIPPED | OUTPATIENT
Start: 2020-12-10 | End: 2022-07-11

## 2021-02-27 ENCOUNTER — APPOINTMENT (OUTPATIENT)
Dept: GENERAL RADIOLOGY | Facility: HOSPITAL | Age: 26
End: 2021-02-27

## 2021-02-27 ENCOUNTER — HOSPITAL ENCOUNTER (EMERGENCY)
Facility: HOSPITAL | Age: 26
Discharge: HOME | End: 2021-02-27
Attending: EMERGENCY MEDICINE | Admitting: EMERGENCY MEDICINE

## 2021-02-27 VITALS
RESPIRATION RATE: 17 BRPM | SYSTOLIC BLOOD PRESSURE: 109 MMHG | WEIGHT: 154 LBS | DIASTOLIC BLOOD PRESSURE: 65 MMHG | HEIGHT: 68 IN | OXYGEN SATURATION: 99 % | HEART RATE: 82 BPM | BODY MASS INDEX: 23.34 KG/M2 | TEMPERATURE: 98.5 F

## 2021-02-27 DIAGNOSIS — R00.2 PALPITATIONS: ICD-10-CM

## 2021-02-27 DIAGNOSIS — R42 EPISODE OF DIZZINESS: Primary | ICD-10-CM

## 2021-02-27 LAB
ALBUMIN SERPL-MCNC: 4.8 G/DL (ref 3.5–5.2)
ALBUMIN/GLOB SERPL: 1.6 G/DL
ALP SERPL-CCNC: 95 U/L (ref 39–117)
ALT SERPL W P-5'-P-CCNC: 11 U/L (ref 1–33)
ANION GAP SERPL CALCULATED.3IONS-SCNC: 11.8 MMOL/L (ref 5–15)
AST SERPL-CCNC: 16 U/L (ref 1–32)
B-HCG UR QL: NEGATIVE
BACTERIA UR QL AUTO: ABNORMAL /HPF
BASOPHILS # BLD AUTO: 0.05 10*3/MM3 (ref 0–0.2)
BASOPHILS NFR BLD AUTO: 0.8 % (ref 0–1.5)
BILIRUB SERPL-MCNC: 0.3 MG/DL (ref 0–1.2)
BILIRUB UR QL STRIP: NEGATIVE
BUN SERPL-MCNC: 12 MG/DL (ref 6–20)
BUN/CREAT SERPL: 16.7 (ref 7–25)
CALCIUM SPEC-SCNC: 10 MG/DL (ref 8.6–10.5)
CHLORIDE SERPL-SCNC: 104 MMOL/L (ref 98–107)
CLARITY UR: CLEAR
CO2 SERPL-SCNC: 24.2 MMOL/L (ref 22–29)
COLOR UR: YELLOW
CREAT SERPL-MCNC: 0.72 MG/DL (ref 0.57–1)
D DIMER PPP FEU-MCNC: 0.44 MCGFEU/ML (ref 0–0.57)
DEPRECATED RDW RBC AUTO: 38.7 FL (ref 37–54)
EOSINOPHIL # BLD AUTO: 0.14 10*3/MM3 (ref 0–0.4)
EOSINOPHIL NFR BLD AUTO: 2.1 % (ref 0.3–6.2)
ERYTHROCYTE [DISTWIDTH] IN BLOOD BY AUTOMATED COUNT: 11.8 % (ref 12.3–15.4)
GFR SERPL CREATININE-BSD FRML MDRD: 99 ML/MIN/1.73
GLOBULIN UR ELPH-MCNC: 3 GM/DL
GLUCOSE SERPL-MCNC: 101 MG/DL (ref 65–99)
GLUCOSE UR STRIP-MCNC: NEGATIVE MG/DL
HCT VFR BLD AUTO: 42.6 % (ref 34–46.6)
HGB BLD-MCNC: 14.3 G/DL (ref 12–15.9)
HGB UR QL STRIP.AUTO: NEGATIVE
HOLD SPECIMEN: NORMAL
HOLD SPECIMEN: NORMAL
HYALINE CASTS UR QL AUTO: ABNORMAL /LPF
IMM GRANULOCYTES # BLD AUTO: 0.02 10*3/MM3 (ref 0–0.05)
IMM GRANULOCYTES NFR BLD AUTO: 0.3 % (ref 0–0.5)
KETONES UR QL STRIP: NEGATIVE
LEUKOCYTE ESTERASE UR QL STRIP.AUTO: ABNORMAL
LYMPHOCYTES # BLD AUTO: 2.46 10*3/MM3 (ref 0.7–3.1)
LYMPHOCYTES NFR BLD AUTO: 36.9 % (ref 19.6–45.3)
MAGNESIUM SERPL-MCNC: 1.8 MG/DL (ref 1.6–2.6)
MCH RBC QN AUTO: 30.1 PG (ref 26.6–33)
MCHC RBC AUTO-ENTMCNC: 33.6 G/DL (ref 31.5–35.7)
MCV RBC AUTO: 89.7 FL (ref 79–97)
MONOCYTES # BLD AUTO: 0.53 10*3/MM3 (ref 0.1–0.9)
MONOCYTES NFR BLD AUTO: 8 % (ref 5–12)
NEUTROPHILS NFR BLD AUTO: 3.46 10*3/MM3 (ref 1.7–7)
NEUTROPHILS NFR BLD AUTO: 51.9 % (ref 42.7–76)
NITRITE UR QL STRIP: NEGATIVE
NRBC BLD AUTO-RTO: 0 /100 WBC (ref 0–0.2)
NT-PROBNP SERPL-MCNC: 15.2 PG/ML (ref 0–450)
PH UR STRIP.AUTO: 8 [PH] (ref 5–8)
PLATELET # BLD AUTO: 236 10*3/MM3 (ref 140–450)
PMV BLD AUTO: 9.5 FL (ref 6–12)
POTASSIUM SERPL-SCNC: 4 MMOL/L (ref 3.5–5.2)
PROT SERPL-MCNC: 7.8 G/DL (ref 6–8.5)
PROT UR QL STRIP: NEGATIVE
RBC # BLD AUTO: 4.75 10*6/MM3 (ref 3.77–5.28)
RBC # UR: ABNORMAL /HPF
REF LAB TEST METHOD: ABNORMAL
SODIUM SERPL-SCNC: 140 MMOL/L (ref 136–145)
SP GR UR STRIP: 1.01 (ref 1–1.03)
SQUAMOUS #/AREA URNS HPF: ABNORMAL /HPF
TROPONIN T SERPL-MCNC: <0.01 NG/ML (ref 0–0.03)
UROBILINOGEN UR QL STRIP: ABNORMAL
WBC # BLD AUTO: 6.66 10*3/MM3 (ref 3.4–10.8)
WBC UR QL AUTO: ABNORMAL /HPF
WHOLE BLOOD HOLD SPECIMEN: NORMAL
WHOLE BLOOD HOLD SPECIMEN: NORMAL

## 2021-02-27 PROCEDURE — 81001 URINALYSIS AUTO W/SCOPE: CPT | Performed by: PHYSICIAN ASSISTANT

## 2021-02-27 PROCEDURE — 80053 COMPREHEN METABOLIC PANEL: CPT | Performed by: PHYSICIAN ASSISTANT

## 2021-02-27 PROCEDURE — 84484 ASSAY OF TROPONIN QUANT: CPT | Performed by: PHYSICIAN ASSISTANT

## 2021-02-27 PROCEDURE — 83735 ASSAY OF MAGNESIUM: CPT | Performed by: PHYSICIAN ASSISTANT

## 2021-02-27 PROCEDURE — 93005 ELECTROCARDIOGRAM TRACING: CPT

## 2021-02-27 PROCEDURE — 99283 EMERGENCY DEPT VISIT LOW MDM: CPT

## 2021-02-27 PROCEDURE — 81025 URINE PREGNANCY TEST: CPT | Performed by: PHYSICIAN ASSISTANT

## 2021-02-27 PROCEDURE — 85025 COMPLETE CBC W/AUTO DIFF WBC: CPT | Performed by: PHYSICIAN ASSISTANT

## 2021-02-27 PROCEDURE — 71045 X-RAY EXAM CHEST 1 VIEW: CPT

## 2021-02-27 PROCEDURE — 83880 ASSAY OF NATRIURETIC PEPTIDE: CPT | Performed by: PHYSICIAN ASSISTANT

## 2021-02-27 PROCEDURE — 85379 FIBRIN DEGRADATION QUANT: CPT | Performed by: PHYSICIAN ASSISTANT

## 2021-02-27 RX ORDER — VENLAFAXINE 75 MG/1
75 TABLET ORAL 2 TIMES DAILY
COMMUNITY
End: 2022-07-11

## 2021-02-27 RX ORDER — SODIUM CHLORIDE 0.9 % (FLUSH) 0.9 %
10 SYRINGE (ML) INJECTION AS NEEDED
Status: DISCONTINUED | OUTPATIENT
Start: 2021-02-27 | End: 2021-02-27 | Stop reason: HOSPADM

## 2021-02-27 RX ADMIN — SODIUM CHLORIDE 500 ML: 9 INJECTION, SOLUTION INTRAVENOUS at 13:57

## 2021-04-12 ENCOUNTER — TRANSCRIBE ORDERS (OUTPATIENT)
Dept: ADMINISTRATIVE | Facility: HOSPITAL | Age: 26
End: 2021-04-12

## 2021-05-27 ENCOUNTER — TRANSCRIBE ORDERS (OUTPATIENT)
Dept: ADMINISTRATIVE | Facility: HOSPITAL | Age: 26
End: 2021-05-27

## 2021-05-27 DIAGNOSIS — R10.2 CHRONIC FEMALE PELVIC PAIN: Primary | ICD-10-CM

## 2021-05-27 DIAGNOSIS — G89.29 CHRONIC FEMALE PELVIC PAIN: Primary | ICD-10-CM

## 2021-06-08 ENCOUNTER — APPOINTMENT (OUTPATIENT)
Dept: ULTRASOUND IMAGING | Facility: HOSPITAL | Age: 26
End: 2021-06-08

## 2022-07-11 ENCOUNTER — OFFICE VISIT (OUTPATIENT)
Dept: INTERNAL MEDICINE | Facility: CLINIC | Age: 27
End: 2022-07-11

## 2022-07-11 VITALS
BODY MASS INDEX: 27.28 KG/M2 | WEIGHT: 180 LBS | HEART RATE: 95 BPM | TEMPERATURE: 98 F | HEIGHT: 68 IN | SYSTOLIC BLOOD PRESSURE: 107 MMHG | RESPIRATION RATE: 16 BRPM | DIASTOLIC BLOOD PRESSURE: 65 MMHG | OXYGEN SATURATION: 98 %

## 2022-07-11 DIAGNOSIS — Z13.228 SCREENING FOR ENDOCRINE, NUTRITIONAL, METABOLIC AND IMMUNITY DISORDER: ICD-10-CM

## 2022-07-11 DIAGNOSIS — R79.89 ELEVATED PROLACTIN LEVEL: ICD-10-CM

## 2022-07-11 DIAGNOSIS — Z13.0 SCREENING FOR ENDOCRINE, NUTRITIONAL, METABOLIC AND IMMUNITY DISORDER: ICD-10-CM

## 2022-07-11 DIAGNOSIS — Z13.21 SCREENING FOR ENDOCRINE, NUTRITIONAL, METABOLIC AND IMMUNITY DISORDER: ICD-10-CM

## 2022-07-11 DIAGNOSIS — E55.9 VITAMIN D INSUFFICIENCY: ICD-10-CM

## 2022-07-11 DIAGNOSIS — Z00.00 PHYSICAL EXAM, ANNUAL: Primary | ICD-10-CM

## 2022-07-11 DIAGNOSIS — Z13.29 SCREENING FOR ENDOCRINE, NUTRITIONAL, METABOLIC AND IMMUNITY DISORDER: ICD-10-CM

## 2022-07-11 DIAGNOSIS — F33.0 MILD EPISODE OF RECURRENT MAJOR DEPRESSIVE DISORDER: ICD-10-CM

## 2022-07-11 DIAGNOSIS — F41.9 ANXIETY: ICD-10-CM

## 2022-07-11 PROBLEM — F41.8 MIXED ANXIETY AND DEPRESSIVE DISORDER: Status: ACTIVE | Noted: 2021-04-12

## 2022-07-11 PROCEDURE — 99395 PREV VISIT EST AGE 18-39: CPT | Performed by: NURSE PRACTITIONER

## 2022-07-11 RX ORDER — VENLAFAXINE HYDROCHLORIDE 150 MG/1
CAPSULE, EXTENDED RELEASE ORAL
COMMUNITY
End: 2022-07-11 | Stop reason: SDUPTHER

## 2022-07-11 RX ORDER — ERGOCALCIFEROL 1.25 MG/1
50000 CAPSULE ORAL WEEKLY
Qty: 12 CAPSULE | Refills: 0 | Status: SHIPPED | OUTPATIENT
Start: 2022-07-11

## 2022-07-11 RX ORDER — BUSPIRONE HYDROCHLORIDE 5 MG/1
5 TABLET ORAL 3 TIMES DAILY
Qty: 90 TABLET | Refills: 1 | Status: SHIPPED | OUTPATIENT
Start: 2022-07-11 | End: 2022-09-15 | Stop reason: SDUPTHER

## 2022-07-11 RX ORDER — DEXTROAMPHETAMINE SACCHARATE, AMPHETAMINE ASPARTATE, DEXTROAMPHETAMINE SULFATE AND AMPHETAMINE SULFATE 2.5; 2.5; 2.5; 2.5 MG/1; MG/1; MG/1; MG/1
TABLET ORAL
COMMUNITY

## 2022-07-11 RX ORDER — VENLAFAXINE HYDROCHLORIDE 150 MG/1
150 CAPSULE, EXTENDED RELEASE ORAL DAILY
Qty: 90 CAPSULE | Refills: 3 | Status: SHIPPED | OUTPATIENT
Start: 2022-07-11
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
## 2022-07-20 LAB
ALBUMIN SERPL-MCNC: 3.9 G/DL (ref 3.5–5.2)
ALBUMIN/GLOB SERPL: 1.3 G/DL
ALP SERPL-CCNC: 92 U/L (ref 39–117)
ALT SERPL-CCNC: 11 U/L (ref 1–33)
AST SERPL-CCNC: 13 U/L (ref 1–32)
BASOPHILS # BLD AUTO: 0.01 10*3/MM3 (ref 0–0.2)
BASOPHILS NFR BLD AUTO: 0.2 % (ref 0–1.5)
BILIRUB SERPL-MCNC: 0.4 MG/DL (ref 0–1.2)
BUN SERPL-MCNC: 10 MG/DL (ref 6–20)
BUN/CREAT SERPL: 12.8 (ref 7–25)
CALCIUM SERPL-MCNC: 9.1 MG/DL (ref 8.6–10.5)
CHLORIDE SERPL-SCNC: 105 MMOL/L (ref 98–107)
CHOLEST SERPL-MCNC: 172 MG/DL (ref 0–200)
CO2 SERPL-SCNC: 26 MMOL/L (ref 22–29)
CREAT SERPL-MCNC: 0.78 MG/DL (ref 0.57–1)
EGFRCR SERPLBLD CKD-EPI 2021: 106.9 ML/MIN/1.73
EOSINOPHIL # BLD AUTO: 0 10*3/MM3 (ref 0–0.4)
EOSINOPHIL NFR BLD AUTO: 0 % (ref 0.3–6.2)
ERYTHROCYTE [DISTWIDTH] IN BLOOD BY AUTOMATED COUNT: 11.7 % (ref 12.3–15.4)
FERRITIN SERPL-MCNC: 16.4 NG/ML (ref 13–150)
GLOBULIN SER CALC-MCNC: 3 GM/DL
GLUCOSE SERPL-MCNC: 94 MG/DL (ref 65–99)
HBA1C MFR BLD: 5.2 % (ref 4.8–5.6)
HCT VFR BLD AUTO: 38.9 % (ref 34–46.6)
HDLC SERPL-MCNC: 46 MG/DL (ref 40–60)
HGB BLD-MCNC: 12.9 G/DL (ref 12–15.9)
IMM GRANULOCYTES # BLD AUTO: 0.02 10*3/MM3 (ref 0–0.05)
IMM GRANULOCYTES NFR BLD AUTO: 0.3 % (ref 0–0.5)
IRON SATN MFR SERPL: 17 % (ref 20–50)
IRON SERPL-MCNC: 79 MCG/DL (ref 37–145)
LDLC SERPL CALC-MCNC: 114 MG/DL (ref 0–100)
LYMPHOCYTES # BLD AUTO: 1.86 10*3/MM3 (ref 0.7–3.1)
LYMPHOCYTES NFR BLD AUTO: 30.3 % (ref 19.6–45.3)
MCH RBC QN AUTO: 29.1 PG (ref 26.6–33)
MCHC RBC AUTO-ENTMCNC: 33.2 G/DL (ref 31.5–35.7)
MCV RBC AUTO: 87.8 FL (ref 79–97)
MONOCYTES # BLD AUTO: 0.49 10*3/MM3 (ref 0.1–0.9)
MONOCYTES NFR BLD AUTO: 8 % (ref 5–12)
NEUTROPHILS # BLD AUTO: 3.75 10*3/MM3 (ref 1.7–7)
NEUTROPHILS NFR BLD AUTO: 61.2 % (ref 42.7–76)
NRBC BLD AUTO-RTO: 0 /100 WBC (ref 0–0.2)
PLATELET # BLD AUTO: 275 10*3/MM3 (ref 140–450)
POTASSIUM SERPL-SCNC: 4.3 MMOL/L (ref 3.5–5.2)
PROLACTIN SERPL-MCNC: 9.9 NG/ML (ref 4.8–23.3)
PROT SERPL-MCNC: 6.9 G/DL (ref 6–8.5)
RBC # BLD AUTO: 4.43 10*6/MM3 (ref 3.77–5.28)
SODIUM SERPL-SCNC: 141 MMOL/L (ref 136–145)
TIBC SERPL-MCNC: 472 MCG/DL
TRIGL SERPL-MCNC: 60 MG/DL (ref 0–150)
UIBC SERPL-MCNC: 393 MCG/DL (ref 112–346)
VLDLC SERPL CALC-MCNC: 12 MG/DL (ref 5–40)
WBC # BLD AUTO: 6.13 10*3/MM3 (ref 3.4–10.8)

## 2022-07-22 NOTE — PROGRESS NOTES
Please contact patient and let her know that labs look okay with exception of elevated LDL which is the bad cholesterol and it went up from 10 4-1 14 recommend dietary modification and exercise to improve.  Patient iron level is in normal range but on the lower end of normalBut iron saturation is low recommend taking iron daily with vitamin C to increase absorption.

## 2022-08-31 ENCOUNTER — HOSPITAL ENCOUNTER (EMERGENCY)
Facility: HOSPITAL | Age: 27
Discharge: HOME | End: 2022-08-31
Attending: EMERGENCY MEDICINE | Admitting: EMERGENCY MEDICINE

## 2022-08-31 ENCOUNTER — APPOINTMENT (OUTPATIENT)
Dept: CT IMAGING | Facility: HOSPITAL | Age: 27
End: 2022-08-31

## 2022-08-31 ENCOUNTER — OFFICE VISIT (OUTPATIENT)
Dept: INTERNAL MEDICINE | Facility: CLINIC | Age: 27
End: 2022-08-31

## 2022-08-31 VITALS
WEIGHT: 179 LBS | HEART RATE: 102 BPM | HEIGHT: 68 IN | TEMPERATURE: 98.6 F | OXYGEN SATURATION: 98 % | BODY MASS INDEX: 27.13 KG/M2 | RESPIRATION RATE: 18 BRPM | DIASTOLIC BLOOD PRESSURE: 78 MMHG | SYSTOLIC BLOOD PRESSURE: 118 MMHG

## 2022-08-31 VITALS
HEIGHT: 68 IN | OXYGEN SATURATION: 98 % | DIASTOLIC BLOOD PRESSURE: 64 MMHG | SYSTOLIC BLOOD PRESSURE: 105 MMHG | BODY MASS INDEX: 27.13 KG/M2 | RESPIRATION RATE: 16 BRPM | TEMPERATURE: 97.5 F | WEIGHT: 179 LBS | HEART RATE: 109 BPM

## 2022-08-31 DIAGNOSIS — M54.2 ACUTE NECK PAIN: Primary | ICD-10-CM

## 2022-08-31 DIAGNOSIS — M54.2 NECK PAIN: Primary | ICD-10-CM

## 2022-08-31 LAB
ALBUMIN SERPL-MCNC: 4.5 G/DL (ref 3.5–5.2)
ALBUMIN/GLOB SERPL: 1.5 G/DL
ALP SERPL-CCNC: 100 U/L (ref 39–117)
ALT SERPL W P-5'-P-CCNC: 12 U/L (ref 1–33)
ANION GAP SERPL CALCULATED.3IONS-SCNC: 9.8 MMOL/L (ref 5–15)
AST SERPL-CCNC: 25 U/L (ref 1–32)
B-HCG UR QL: NEGATIVE
BASOPHILS # BLD AUTO: 0.01 10*3/MM3 (ref 0–0.2)
BASOPHILS NFR BLD AUTO: 0.1 % (ref 0–1.5)
BILIRUB SERPL-MCNC: 0.3 MG/DL (ref 0–1.2)
BUN SERPL-MCNC: 8 MG/DL (ref 6–20)
BUN/CREAT SERPL: 10.8 (ref 7–25)
CALCIUM SPEC-SCNC: 9.5 MG/DL (ref 8.6–10.5)
CHLORIDE SERPL-SCNC: 101 MMOL/L (ref 98–107)
CO2 SERPL-SCNC: 25.2 MMOL/L (ref 22–29)
CREAT SERPL-MCNC: 0.74 MG/DL (ref 0.57–1)
DEPRECATED RDW RBC AUTO: 38.8 FL (ref 37–54)
EGFRCR SERPLBLD CKD-EPI 2021: 113.9 ML/MIN/1.73
EOSINOPHIL # BLD AUTO: 0 10*3/MM3 (ref 0–0.4)
EOSINOPHIL NFR BLD AUTO: 0 % (ref 0.3–6.2)
ERYTHROCYTE [DISTWIDTH] IN BLOOD BY AUTOMATED COUNT: 12.2 % (ref 12.3–15.4)
GLOBULIN UR ELPH-MCNC: 3 GM/DL
GLUCOSE SERPL-MCNC: 80 MG/DL (ref 65–99)
HCT VFR BLD AUTO: 42.5 % (ref 34–46.6)
HGB BLD-MCNC: 14.3 G/DL (ref 12–15.9)
IMM GRANULOCYTES # BLD AUTO: 0.01 10*3/MM3 (ref 0–0.05)
IMM GRANULOCYTES NFR BLD AUTO: 0.1 % (ref 0–0.5)
LYMPHOCYTES # BLD AUTO: 1.78 10*3/MM3 (ref 0.7–3.1)
LYMPHOCYTES NFR BLD AUTO: 23.1 % (ref 19.6–45.3)
MCH RBC QN AUTO: 29.3 PG (ref 26.6–33)
MCHC RBC AUTO-ENTMCNC: 33.6 G/DL (ref 31.5–35.7)
MCV RBC AUTO: 87.1 FL (ref 79–97)
MONOCYTES # BLD AUTO: 0.65 10*3/MM3 (ref 0.1–0.9)
MONOCYTES NFR BLD AUTO: 8.4 % (ref 5–12)
NEUTROPHILS NFR BLD AUTO: 5.25 10*3/MM3 (ref 1.7–7)
NEUTROPHILS NFR BLD AUTO: 68.3 % (ref 42.7–76)
NRBC BLD AUTO-RTO: 0 /100 WBC (ref 0–0.2)
PLATELET # BLD AUTO: 237 10*3/MM3 (ref 140–450)
PMV BLD AUTO: 9.8 FL (ref 6–12)
POTASSIUM SERPL-SCNC: 4.9 MMOL/L (ref 3.5–5.2)
PROT SERPL-MCNC: 7.5 G/DL (ref 6–8.5)
RBC # BLD AUTO: 4.88 10*6/MM3 (ref 3.77–5.28)
SODIUM SERPL-SCNC: 136 MMOL/L (ref 136–145)
WBC NRBC COR # BLD: 7.7 10*3/MM3 (ref 3.4–10.8)

## 2022-08-31 PROCEDURE — 70496 CT ANGIOGRAPHY HEAD: CPT

## 2022-08-31 PROCEDURE — 81025 URINE PREGNANCY TEST: CPT | Performed by: EMERGENCY MEDICINE

## 2022-08-31 PROCEDURE — 93005 ELECTROCARDIOGRAM TRACING: CPT

## 2022-08-31 PROCEDURE — 96374 THER/PROPH/DIAG INJ IV PUSH: CPT

## 2022-08-31 PROCEDURE — 70498 CT ANGIOGRAPHY NECK: CPT

## 2022-08-31 PROCEDURE — 99283 EMERGENCY DEPT VISIT LOW MDM: CPT

## 2022-08-31 PROCEDURE — 99214 OFFICE O/P EST MOD 30 MIN: CPT | Performed by: INTERNAL MEDICINE

## 2022-08-31 PROCEDURE — 96375 TX/PRO/DX INJ NEW DRUG ADDON: CPT

## 2022-08-31 PROCEDURE — 85025 COMPLETE CBC W/AUTO DIFF WBC: CPT | Performed by: EMERGENCY MEDICINE

## 2022-08-31 PROCEDURE — 25010000002 DIPHENHYDRAMINE PER 50 MG: Performed by: EMERGENCY MEDICINE

## 2022-08-31 PROCEDURE — 25010000002 IOPAMIDOL 61 % SOLUTION: Performed by: EMERGENCY MEDICINE

## 2022-08-31 PROCEDURE — 80053 COMPREHEN METABOLIC PANEL: CPT | Performed by: EMERGENCY MEDICINE

## 2022-08-31 PROCEDURE — 25010000002 METHYLPREDNISOLONE PER 125 MG: Performed by: EMERGENCY MEDICINE

## 2022-08-31 RX ORDER — METHYLPREDNISOLONE SODIUM SUCCINATE 125 MG/2ML
80 INJECTION, POWDER, LYOPHILIZED, FOR SOLUTION INTRAMUSCULAR; INTRAVENOUS ONCE
Status: COMPLETED | OUTPATIENT
Start: 2022-08-31 | End: 2022-08-31

## 2022-08-31 RX ORDER — CYCLOBENZAPRINE HCL 5 MG
5 TABLET ORAL 3 TIMES DAILY PRN
Qty: 15 TABLET | Refills: 0 | Status: SHIPPED | OUTPATIENT
Start: 2022-08-31

## 2022-08-31 RX ORDER — DIPHENHYDRAMINE HYDROCHLORIDE 50 MG/ML
25 INJECTION INTRAMUSCULAR; INTRAVENOUS ONCE
Status: COMPLETED | OUTPATIENT
Start: 2022-08-31 | End: 2022-08-31

## 2022-08-31 RX ORDER — SODIUM CHLORIDE 0.9 % (FLUSH) 0.9 %
10 SYRINGE (ML) INJECTION AS NEEDED
Status: DISCONTINUED | OUTPATIENT
Start: 2022-08-31 | End: 2022-08-31 | Stop reason: HOSPADM

## 2022-08-31 RX ORDER — IBUPROFEN 800 MG/1
800 TABLET ORAL ONCE
Status: COMPLETED | OUTPATIENT
Start: 2022-08-31 | End: 2022-08-31

## 2022-08-31 RX ORDER — CYCLOBENZAPRINE HCL 10 MG
10 TABLET ORAL ONCE
Status: COMPLETED | OUTPATIENT
Start: 2022-08-31 | End: 2022-08-31

## 2022-08-31 RX ADMIN — CYCLOBENZAPRINE 10 MG: 10 TABLET, FILM COATED ORAL at 21:08

## 2022-08-31 RX ADMIN — DIPHENHYDRAMINE HYDROCHLORIDE 25 MG: 50 INJECTION INTRAMUSCULAR; INTRAVENOUS at 18:44

## 2022-08-31 RX ADMIN — METHYLPREDNISOLONE SODIUM SUCCINATE 80 MG: 125 INJECTION, POWDER, FOR SOLUTION INTRAMUSCULAR; INTRAVENOUS at 18:43

## 2022-08-31 RX ADMIN — IOPAMIDOL 100 ML: 612 INJECTION, SOLUTION INTRAVENOUS at 19:37

## 2022-08-31 RX ADMIN — IBUPROFEN 800 MG: 800 TABLET, FILM COATED ORAL at 21:08

## 2022-09-15 ENCOUNTER — OFFICE VISIT (OUTPATIENT)
Dept: INTERNAL MEDICINE | Facility: CLINIC | Age: 27
End: 2022-09-15

## 2022-09-15 VITALS
WEIGHT: 199 LBS | TEMPERATURE: 98 F | HEIGHT: 69 IN | SYSTOLIC BLOOD PRESSURE: 111 MMHG | RESPIRATION RATE: 15 BRPM | OXYGEN SATURATION: 100 % | DIASTOLIC BLOOD PRESSURE: 71 MMHG | HEART RATE: 95 BPM | BODY MASS INDEX: 29.47 KG/M2

## 2022-09-15 DIAGNOSIS — F41.9 ANXIETY: ICD-10-CM

## 2022-09-15 DIAGNOSIS — Z11.1 SCREENING-PULMONARY TB: ICD-10-CM

## 2022-09-15 DIAGNOSIS — Z01.84 IMMUNITY STATUS TESTING: Primary | ICD-10-CM

## 2022-09-15 PROCEDURE — 99213 OFFICE O/P EST LOW 20 MIN: CPT | Performed by: NURSE PRACTITIONER

## 2022-09-15 RX ORDER — BUSPIRONE HYDROCHLORIDE 10 MG/1
10 TABLET ORAL 3 TIMES DAILY
Qty: 90 TABLET | Refills: 1 | Status: SHIPPED | OUTPATIENT
Start: 2022-09-15 | End: 2023-03-01

## 2022-09-21 LAB
GAMMA INTERFERON BACKGROUND BLD IA-ACNC: 0.01 IU/ML
M TB IFN-G BLD-IMP: NEGATIVE
M TB IFN-G CD4+ BCKGRND COR BLD-ACNC: 0.05 IU/ML
M TB IFN-G CD4+CD8+ BCKGRND COR BLD-ACNC: 0.06 IU/ML
MITOGEN IGNF BLD-ACNC: 9.84 IU/ML
QUANTIFERON INCUBATION: NORMAL
SERVICE CMNT-IMP: NORMAL
VZV IGG SER IA-ACNC: 239 INDEX
VZV IGM SER IA-ACNC: <0.91 INDEX (ref 0–0.9)

## 2022-10-02 NOTE — PROGRESS NOTES
Chief Complaint / Reason:      Chief Complaint   Patient presents with   • Anxiety     And needs a t-spot       Subjective     HPI  Patient presents today with complaints of anxiety denies SI or HI.  She also is requesting a T spot to be performed.  Denies any side effects to current medication regimen.  History taken from: patient    PMH/FH/Social History were reviewed and updated appropriately in the electronic medical record.   Past Medical History:   Diagnosis Date   • ADHD (attention deficit hyperactivity disorder)    • Allergic    • Anemia    • Anxiety    • Chest pain    • Chronic headaches 2016   • Collapse    • Depression    • Heart murmur    • Hematuria    • Pneumonia 2016   • Scoliosis      Past Surgical History:   Procedure Laterality Date   • ADENOIDECTOMY     •  SECTION     • CYSTOSCOPY     • CYSTOSCOPY     • MOUTH SURGERY      wisdom teeth   • TONSILLECTOMY       Social History     Socioeconomic History   • Marital status:    Tobacco Use   • Smoking status: Never Smoker   • Smokeless tobacco: Never Used   Vaping Use   • Vaping Use: Never used   Substance and Sexual Activity   • Alcohol use: No   • Drug use: No   • Sexual activity: Defer     Family History   Problem Relation Age of Onset   • Heart disease Mother    • Hypertension Mother    • Hyperlipidemia Mother    • Heart disease Father    • Hypertension Father    • Hyperlipidemia Father    • Liver disease Father    • Heart attack Father    • Arthritis Maternal Grandmother    • Cancer Maternal Grandmother    • Kidney disease Maternal Grandmother    • Stroke Maternal Grandmother    • Cancer Paternal Grandmother    • Cancer Paternal Grandfather    • Stroke Paternal Grandfather    • Arthritis Paternal Grandfather    • Diabetes Paternal Grandfather    • Heart attack Paternal Grandfather        Review of Systems:   Review of Systems      All other systems were reviewed and are negative.  Exceptions are noted in the subjective  or above.      Objective     Vital Signs  Vitals:    09/15/22 1732   BP: 111/71   Pulse: 95   Resp: 15   Temp: 98 °F (36.7 °C)   SpO2: 100%       Body mass index is 29.39 kg/m².    Physical Exam  Vitals and nursing note reviewed.   Constitutional:       General: She is not in acute distress.     Appearance: She is well-developed.   Cardiovascular:      Rate and Rhythm: Normal rate and regular rhythm.      Pulses: Normal pulses.      Heart sounds: Normal heart sounds.   Pulmonary:      Effort: Pulmonary effort is normal.      Breath sounds: Normal breath sounds. No wheezing.   Chest:      Chest wall: No tenderness.   Skin:     General: Skin is warm and dry.      Capillary Refill: Capillary refill takes less than 2 seconds.      Coloration: Skin is not pale.      Findings: No erythema or rash.   Neurological:      Mental Status: She is alert and oriented to person, place, and time.   Psychiatric:         Behavior: Behavior normal.         Thought Content: Thought content normal.         Judgment: Judgment normal.              Results Review:    I reviewed the patient's previous clinical results.       Medication Review:     Current Outpatient Medications:   •  amphetamine-dextroamphetamine (ADDERALL) 10 MG tablet, Adderall 10 mg tablet  Take 1 tablet every day by oral route., Disp: , Rfl:   •  busPIRone (BUSPAR) 10 MG tablet, Take 1 tablet by mouth 3 (Three) Times a Day., Disp: 90 tablet, Rfl: 1  •  cyclobenzaprine (FLEXERIL) 5 MG tablet, Take 1 tablet by mouth 3 (Three) Times a Day As Needed for Muscle Spasms., Disp: 15 tablet, Rfl: 0  •  ibuprofen (ADVIL,MOTRIN) 800 MG tablet, ibuprofen 800 mg tablet, Disp: , Rfl:   •  ondansetron ODT (Zofran ODT) 4 MG disintegrating tablet, Place 1 tablet on the tongue Every 8 (Eight) Hours As Needed for Nausea., Disp: 15 tablet, Rfl: 1  •  venlafaxine XR (EFFEXOR-XR) 150 MG 24 hr capsule, Take 1 capsule by mouth Daily., Disp: 90 capsule, Rfl: 3  •  vitamin D (ERGOCALCIFEROL) 1.25  MG (63252 UT) capsule capsule, Take 1 capsule by mouth 1 (One) Time Per Week., Disp: 12 capsule, Rfl: 0    Diagnoses and all orders for this visit:    Immunity status testing  -     Varicella Zoster Antibodies, IgG / IgM    Anxiety  -     busPIRone (BUSPAR) 10 MG tablet; Take 1 tablet by mouth 3 (Three) Times a Day.  Discussed nonpharmacological interventions.  Screening-pulmonary TB  -     QuantiFERON TB Gold          Return in about 4 weeks (around 10/13/2022), or if symptoms worsen or fail to improve.    Negin Broussard, APRN  09/15/2022

## 2023-03-01 ENCOUNTER — LAB (OUTPATIENT)
Dept: LAB | Facility: HOSPITAL | Age: 28
End: 2023-03-01
Payer: COMMERCIAL

## 2023-03-01 ENCOUNTER — TRANSCRIBE ORDERS (OUTPATIENT)
Dept: LAB | Facility: HOSPITAL | Age: 28
End: 2023-03-01
Payer: COMMERCIAL

## 2023-03-01 DIAGNOSIS — E28.2 POLYCYSTIC OVARIES: ICD-10-CM

## 2023-03-01 DIAGNOSIS — F41.9 ANXIETY: ICD-10-CM

## 2023-03-01 DIAGNOSIS — E28.2 POLYCYSTIC OVARIES: Primary | ICD-10-CM

## 2023-03-01 LAB
ALBUMIN SERPL-MCNC: 4.4 G/DL (ref 3.5–5.2)
ALBUMIN/GLOB SERPL: 1.5 G/DL
ALP SERPL-CCNC: 102 U/L (ref 39–117)
ALT SERPL W P-5'-P-CCNC: 13 U/L (ref 1–33)
ANION GAP SERPL CALCULATED.3IONS-SCNC: 9 MMOL/L (ref 5–15)
AST SERPL-CCNC: 17 U/L (ref 1–32)
BILIRUB SERPL-MCNC: 0.2 MG/DL (ref 0–1.2)
BUN SERPL-MCNC: 8 MG/DL (ref 6–20)
BUN/CREAT SERPL: 12.1 (ref 7–25)
CALCIUM SPEC-SCNC: 9.3 MG/DL (ref 8.6–10.5)
CHLORIDE SERPL-SCNC: 101 MMOL/L (ref 98–107)
CO2 SERPL-SCNC: 29 MMOL/L (ref 22–29)
CREAT SERPL-MCNC: 0.66 MG/DL (ref 0.57–1)
DEPRECATED RDW RBC AUTO: 36.8 FL (ref 37–54)
EGFRCR SERPLBLD CKD-EPI 2021: 123.5 ML/MIN/1.73
ERYTHROCYTE [DISTWIDTH] IN BLOOD BY AUTOMATED COUNT: 12 % (ref 12.3–15.4)
GLOBULIN UR ELPH-MCNC: 2.9 GM/DL
GLUCOSE SERPL-MCNC: 88 MG/DL (ref 65–99)
HCT VFR BLD AUTO: 36.3 % (ref 34–46.6)
HGB BLD-MCNC: 12.1 G/DL (ref 12–15.9)
MCH RBC QN AUTO: 28.5 PG (ref 26.6–33)
MCHC RBC AUTO-ENTMCNC: 33.3 G/DL (ref 31.5–35.7)
MCV RBC AUTO: 85.6 FL (ref 79–97)
PLATELET # BLD AUTO: 273 10*3/MM3 (ref 140–450)
PMV BLD AUTO: 9.8 FL (ref 6–12)
POTASSIUM SERPL-SCNC: 4 MMOL/L (ref 3.5–5.2)
PROT SERPL-MCNC: 7.3 G/DL (ref 6–8.5)
RBC # BLD AUTO: 4.24 10*6/MM3 (ref 3.77–5.28)
SODIUM SERPL-SCNC: 139 MMOL/L (ref 136–145)
TSH SERPL DL<=0.05 MIU/L-ACNC: 1 UIU/ML (ref 0.27–4.2)
WBC NRBC COR # BLD: 6.7 10*3/MM3 (ref 3.4–10.8)

## 2023-03-01 PROCEDURE — 83001 ASSAY OF GONADOTROPIN (FSH): CPT

## 2023-03-01 PROCEDURE — 36415 COLL VENOUS BLD VENIPUNCTURE: CPT

## 2023-03-01 PROCEDURE — 83498 ASY HYDROXYPROGESTERONE 17-D: CPT

## 2023-03-01 PROCEDURE — 84403 ASSAY OF TOTAL TESTOSTERONE: CPT

## 2023-03-01 PROCEDURE — 82670 ASSAY OF TOTAL ESTRADIOL: CPT

## 2023-03-01 PROCEDURE — 83002 ASSAY OF GONADOTROPIN (LH): CPT

## 2023-03-01 PROCEDURE — 82627 DEHYDROEPIANDROSTERONE: CPT

## 2023-03-01 PROCEDURE — 83525 ASSAY OF INSULIN: CPT

## 2023-03-01 PROCEDURE — 84146 ASSAY OF PROLACTIN: CPT

## 2023-03-01 PROCEDURE — 84402 ASSAY OF FREE TESTOSTERONE: CPT

## 2023-03-01 PROCEDURE — 80050 GENERAL HEALTH PANEL: CPT

## 2023-03-01 RX ORDER — BUSPIRONE HYDROCHLORIDE 10 MG/1
TABLET ORAL
Qty: 90 TABLET | Refills: 1 | Status: SHIPPED | OUTPATIENT
Start: 2023-03-01

## 2023-03-01 NOTE — TELEPHONE ENCOUNTER
Rx Refill Note  Requested Prescriptions     Pending Prescriptions Disp Refills   • busPIRone (BUSPAR) 10 MG tablet [Pharmacy Med Name: busPIRone HCL 10 MG TABLET] 90 tablet 1     Sig: TAKE ONE TABLET BY MOUTH THREE TIMES A DAY      Last office visit with prescribing clinician: 9/15/2022   Last telemedicine visit with prescribing clinician:   Next office visit with prescribing clinician: 3/10/2023                         Would you like a call back once the refill request has been completed: [] Yes [] No    If the office needs to give you a call back, can they leave a voicemail: [] Yes [] No    Arnaldo Faria MA  03/01/23, 18:12 EST

## 2023-03-02 LAB
ESTRADIOL SERPL HS-MCNC: 42.3 PG/ML
FSH SERPL-ACNC: 5.78 MIU/ML
LH SERPL-ACNC: 22.6 MIU/ML
PROLACTIN SERPL-MCNC: 33 NG/ML (ref 4.79–23.3)

## 2023-03-03 LAB — DHEA-S SERPL-MCNC: 34.4 UG/DL (ref 84.8–378)

## 2023-03-08 LAB
TESTOST FREE SERPL-MCNC: 0.2 PG/ML (ref 0–4.2)
TESTOST SERPL-MCNC: 3 NG/DL (ref 13–71)

## 2023-03-09 LAB — INSULIN SERPL-ACNC: 21 UIU/ML

## 2023-03-10 LAB — 17OHP SERPL-MCNC: 32 NG/DL

## 2023-03-22 ENCOUNTER — APPOINTMENT (OUTPATIENT)
Dept: ULTRASOUND IMAGING | Facility: HOSPITAL | Age: 28
End: 2023-03-22
Payer: COMMERCIAL

## 2023-03-22 ENCOUNTER — HOSPITAL ENCOUNTER (EMERGENCY)
Facility: HOSPITAL | Age: 28
Discharge: HOME | End: 2023-03-22
Attending: EMERGENCY MEDICINE | Admitting: EMERGENCY MEDICINE
Payer: COMMERCIAL

## 2023-03-22 VITALS
HEIGHT: 69 IN | HEART RATE: 111 BPM | RESPIRATION RATE: 14 BRPM | OXYGEN SATURATION: 97 % | TEMPERATURE: 98.6 F | WEIGHT: 199 LBS | SYSTOLIC BLOOD PRESSURE: 109 MMHG | DIASTOLIC BLOOD PRESSURE: 80 MMHG | BODY MASS INDEX: 29.47 KG/M2

## 2023-03-22 DIAGNOSIS — N83.202 LEFT OVARIAN CYST: Primary | ICD-10-CM

## 2023-03-22 DIAGNOSIS — N93.9 VAGINAL BLEEDING: ICD-10-CM

## 2023-03-22 LAB
ABO GROUP BLD: NORMAL
ABO GROUP BLD: NORMAL
ALBUMIN SERPL-MCNC: 4.4 G/DL (ref 3.5–5.2)
ALBUMIN/GLOB SERPL: 1.4 G/DL
ALP SERPL-CCNC: 104 U/L (ref 39–117)
ALT SERPL W P-5'-P-CCNC: 9 U/L (ref 1–33)
ANION GAP SERPL CALCULATED.3IONS-SCNC: 12.6 MMOL/L (ref 5–15)
AST SERPL-CCNC: 15 U/L (ref 1–32)
BACTERIA UR QL AUTO: ABNORMAL /HPF
BASOPHILS # BLD AUTO: 0.01 10*3/MM3 (ref 0–0.2)
BASOPHILS NFR BLD AUTO: 0.2 % (ref 0–1.5)
BILIRUB SERPL-MCNC: 0.4 MG/DL (ref 0–1.2)
BILIRUB UR QL STRIP: ABNORMAL
BLD GP AB SCN SERPL QL: NEGATIVE
BUN SERPL-MCNC: 9 MG/DL (ref 6–20)
BUN/CREAT SERPL: 11.4 (ref 7–25)
CALCIUM SPEC-SCNC: 9.1 MG/DL (ref 8.6–10.5)
CHLORIDE SERPL-SCNC: 102 MMOL/L (ref 98–107)
CLARITY UR: ABNORMAL
CO2 SERPL-SCNC: 23.4 MMOL/L (ref 22–29)
COLOR UR: ABNORMAL
CREAT SERPL-MCNC: 0.79 MG/DL (ref 0.57–1)
CRP SERPL-MCNC: 0.75 MG/DL (ref 0–0.5)
DEPRECATED RDW RBC AUTO: 36 FL (ref 37–54)
EGFRCR SERPLBLD CKD-EPI 2021: 105.3 ML/MIN/1.73
EOSINOPHIL # BLD AUTO: 0 10*3/MM3 (ref 0–0.4)
EOSINOPHIL NFR BLD AUTO: 0 % (ref 0.3–6.2)
ERYTHROCYTE [DISTWIDTH] IN BLOOD BY AUTOMATED COUNT: 11.9 % (ref 12.3–15.4)
ERYTHROCYTE [SEDIMENTATION RATE] IN BLOOD: 25 MM/HR (ref 0–20)
GLOBULIN UR ELPH-MCNC: 3.1 GM/DL
GLUCOSE SERPL-MCNC: 137 MG/DL (ref 65–99)
GLUCOSE UR STRIP-MCNC: NEGATIVE MG/DL
HCG INTACT+B SERPL-ACNC: <0.1 MIU/ML
HCT VFR BLD AUTO: 36.5 % (ref 34–46.6)
HGB BLD-MCNC: 12 G/DL (ref 12–15.9)
HGB UR QL STRIP.AUTO: ABNORMAL
HYALINE CASTS UR QL AUTO: ABNORMAL /LPF
IMM GRANULOCYTES # BLD AUTO: 0.02 10*3/MM3 (ref 0–0.05)
IMM GRANULOCYTES NFR BLD AUTO: 0.3 % (ref 0–0.5)
KETONES UR QL STRIP: NEGATIVE
LEUKOCYTE ESTERASE UR QL STRIP.AUTO: ABNORMAL
LYMPHOCYTES # BLD AUTO: 1.86 10*3/MM3 (ref 0.7–3.1)
LYMPHOCYTES NFR BLD AUTO: 28.2 % (ref 19.6–45.3)
MCH RBC QN AUTO: 27.8 PG (ref 26.6–33)
MCHC RBC AUTO-ENTMCNC: 32.9 G/DL (ref 31.5–35.7)
MCV RBC AUTO: 84.5 FL (ref 79–97)
MONOCYTES # BLD AUTO: 0.4 10*3/MM3 (ref 0.1–0.9)
MONOCYTES NFR BLD AUTO: 6.1 % (ref 5–12)
NEUTROPHILS NFR BLD AUTO: 4.3 10*3/MM3 (ref 1.7–7)
NEUTROPHILS NFR BLD AUTO: 65.2 % (ref 42.7–76)
NITRITE UR QL STRIP: NEGATIVE
NRBC BLD AUTO-RTO: 0 /100 WBC (ref 0–0.2)
PH UR STRIP.AUTO: 7 [PH] (ref 5–8)
PLATELET # BLD AUTO: 321 10*3/MM3 (ref 140–450)
PMV BLD AUTO: 9.4 FL (ref 6–12)
POTASSIUM SERPL-SCNC: 4.1 MMOL/L (ref 3.5–5.2)
PROT SERPL-MCNC: 7.5 G/DL (ref 6–8.5)
PROT UR QL STRIP: ABNORMAL
RBC # BLD AUTO: 4.32 10*6/MM3 (ref 3.77–5.28)
RBC # UR STRIP: ABNORMAL /HPF
REF LAB TEST METHOD: ABNORMAL
RH BLD: POSITIVE
RH BLD: POSITIVE
SODIUM SERPL-SCNC: 138 MMOL/L (ref 136–145)
SP GR UR STRIP: 1.02 (ref 1–1.03)
SQUAMOUS #/AREA URNS HPF: ABNORMAL /HPF
T&S EXPIRATION DATE: NORMAL
UROBILINOGEN UR QL STRIP: ABNORMAL
WBC # UR STRIP: ABNORMAL /HPF
WBC NRBC COR # BLD: 6.59 10*3/MM3 (ref 3.4–10.8)

## 2023-03-22 PROCEDURE — 86901 BLOOD TYPING SEROLOGIC RH(D): CPT | Performed by: PHYSICIAN ASSISTANT

## 2023-03-22 PROCEDURE — 80053 COMPREHEN METABOLIC PANEL: CPT | Performed by: PHYSICIAN ASSISTANT

## 2023-03-22 PROCEDURE — 85025 COMPLETE CBC W/AUTO DIFF WBC: CPT | Performed by: PHYSICIAN ASSISTANT

## 2023-03-22 PROCEDURE — 86900 BLOOD TYPING SEROLOGIC ABO: CPT

## 2023-03-22 PROCEDURE — 99283 EMERGENCY DEPT VISIT LOW MDM: CPT

## 2023-03-22 PROCEDURE — 85652 RBC SED RATE AUTOMATED: CPT | Performed by: PHYSICIAN ASSISTANT

## 2023-03-22 PROCEDURE — 36415 COLL VENOUS BLD VENIPUNCTURE: CPT

## 2023-03-22 PROCEDURE — 86901 BLOOD TYPING SEROLOGIC RH(D): CPT

## 2023-03-22 PROCEDURE — 86140 C-REACTIVE PROTEIN: CPT | Performed by: PHYSICIAN ASSISTANT

## 2023-03-22 PROCEDURE — 84702 CHORIONIC GONADOTROPIN TEST: CPT | Performed by: PHYSICIAN ASSISTANT

## 2023-03-22 PROCEDURE — 86900 BLOOD TYPING SEROLOGIC ABO: CPT | Performed by: PHYSICIAN ASSISTANT

## 2023-03-22 PROCEDURE — 76856 US EXAM PELVIC COMPLETE: CPT

## 2023-03-22 PROCEDURE — 86850 RBC ANTIBODY SCREEN: CPT | Performed by: PHYSICIAN ASSISTANT

## 2023-03-22 PROCEDURE — 81001 URINALYSIS AUTO W/SCOPE: CPT | Performed by: PHYSICIAN ASSISTANT

## 2023-03-22 RX ORDER — INDOMETHACIN 25 MG/1
25 CAPSULE ORAL 2 TIMES DAILY WITH MEALS
Qty: 20 CAPSULE | Refills: 0 | Status: SHIPPED | OUTPATIENT
Start: 2023-03-22

## 2023-03-22 RX ORDER — MEDROXYPROGESTERONE ACETATE 10 MG/1
10 TABLET ORAL DAILY
Qty: 7 TABLET | Refills: 0 | Status: SHIPPED | OUTPATIENT
Start: 2023-03-22 | End: 2023-03-29

## 2023-03-22 NOTE — ED PROVIDER NOTES
Subjective   History of Present Illness  This is a 27-year-old female who presents to the emergency department with complaint of vaginal bleeding x1 month.  Patient states she has had worsening abdominal cramping starting today.  Denies dizziness, lightheadedness, weakness.  No reported fever, chills, body aches.    History provided by:  Patient   used: No    Vaginal Bleeding  Quality:  Bright red  Severity:  Moderate  Onset quality:  Gradual  Duration:  2 days  Timing:  Intermittent  Progression:  Worsening  Chronicity:  New  Possible pregnancy: no    Context: not after intercourse, not after urination, not at rest, not during intercourse, not during urination and not genital trauma    Relieved by:  Nothing  Worsened by:  Nothing  Ineffective treatments:  None tried  Associated symptoms: abdominal pain    Associated symptoms: no back pain, no dyspareunia, no dysuria, no fatigue, no fever, no nausea and no vaginal discharge    Risk factors: no bleeding disorder, no hx of ectopic pregnancy, no hx of endometriosis, no gynecological surgery, does not have multiple partners, no ovarian cysts, no ovarian torsion, no PID, no prior miscarriage, no STD, no STD exposure and no terminated pregnancies        Review of Systems   Constitutional: Negative for activity change, appetite change, chills, fatigue and fever.   Eyes: Negative for pain, redness and itching.   Respiratory: Negative.  Negative for cough, choking and chest tightness.    Cardiovascular: Negative for chest pain and leg swelling.   Gastrointestinal: Positive for abdominal distention and abdominal pain. Negative for nausea.   Genitourinary: Positive for vaginal bleeding. Negative for dyspareunia, dysuria and vaginal discharge.   Musculoskeletal: Negative for back pain, gait problem, joint swelling, myalgias and neck pain.   Skin: Negative for color change, pallor and wound.   Neurological: Negative.  Negative for seizures, facial asymmetry,  speech difficulty, light-headedness and numbness.   Hematological: Negative.    Psychiatric/Behavioral: Negative.  Negative for confusion, decreased concentration and hallucinations. The patient is not nervous/anxious and is not hyperactive.    All other systems reviewed and are negative.      Past Medical History:   Diagnosis Date   • ADHD (attention deficit hyperactivity disorder)    • Allergic    • Anemia    • Anxiety    • Chest pain    • Chronic headaches 2016   • Collapse    • Depression    • Heart murmur    • Hematuria    • Pneumonia 2016   • Scoliosis        Allergies   Allergen Reactions   • Shellfish Allergy Anaphylaxis   • Penicillins Hives   • Iodine Rash       Past Surgical History:   Procedure Laterality Date   • ADENOIDECTOMY     •  SECTION     • CYSTOSCOPY     • CYSTOSCOPY     • MOUTH SURGERY      wisdom teeth   • TONSILLECTOMY         Family History   Problem Relation Age of Onset   • Heart disease Mother    • Hypertension Mother    • Hyperlipidemia Mother    • Heart disease Father    • Hypertension Father    • Hyperlipidemia Father    • Liver disease Father    • Heart attack Father    • Arthritis Maternal Grandmother    • Cancer Maternal Grandmother    • Kidney disease Maternal Grandmother    • Stroke Maternal Grandmother    • Cancer Paternal Grandmother    • Cancer Paternal Grandfather    • Stroke Paternal Grandfather    • Arthritis Paternal Grandfather    • Diabetes Paternal Grandfather    • Heart attack Paternal Grandfather        Social History     Socioeconomic History   • Marital status:    Tobacco Use   • Smoking status: Never   • Smokeless tobacco: Never   Vaping Use   • Vaping Use: Never used   Substance and Sexual Activity   • Alcohol use: No   • Drug use: No   • Sexual activity: Defer           Objective   Physical Exam  Vitals and nursing note reviewed.   Constitutional:       General: She is not in acute distress.     Appearance: Normal appearance. She is  normal weight. She is not ill-appearing, toxic-appearing or diaphoretic.   HENT:      Head: Normocephalic and atraumatic.      Right Ear: Tympanic membrane, ear canal and external ear normal.      Left Ear: Tympanic membrane, ear canal and external ear normal.      Nose: Nose normal. No congestion or rhinorrhea.      Mouth/Throat:      Mouth: Mucous membranes are moist.      Pharynx: Oropharynx is clear. No oropharyngeal exudate or posterior oropharyngeal erythema.   Eyes:      General: No scleral icterus.        Right eye: No discharge.         Left eye: No discharge.      Extraocular Movements: Extraocular movements intact.      Conjunctiva/sclera: Conjunctivae normal.      Pupils: Pupils are equal, round, and reactive to light.   Cardiovascular:      Rate and Rhythm: Normal rate.      Pulses: Normal pulses.      Heart sounds: Normal heart sounds. No murmur heard.    No gallop.   Pulmonary:      Effort: Pulmonary effort is normal. No respiratory distress.      Breath sounds: Normal breath sounds. No stridor. No wheezing, rhonchi or rales.   Abdominal:      General: Abdomen is flat. Bowel sounds are normal. There is no distension.      Palpations: Abdomen is soft. There is no mass.      Tenderness: There is no abdominal tenderness. There is no right CVA tenderness, left CVA tenderness, guarding or rebound.      Hernia: No hernia is present.   Musculoskeletal:         General: No swelling, tenderness, deformity or signs of injury. Normal range of motion.      Cervical back: Normal range of motion and neck supple. No rigidity or tenderness.      Right lower leg: No edema.      Left lower leg: No edema.   Lymphadenopathy:      Cervical: No cervical adenopathy.   Skin:     General: Skin is warm and dry.      Coloration: Skin is not jaundiced or pale.      Findings: No bruising, erythema, lesion or rash.   Neurological:      General: No focal deficit present.      Mental Status: She is alert and oriented to person,  place, and time. Mental status is at baseline.      Cranial Nerves: No cranial nerve deficit.      Sensory: No sensory deficit.      Motor: No weakness.      Coordination: Coordination normal.      Gait: Gait normal.   Psychiatric:         Mood and Affect: Mood normal.         Behavior: Behavior normal.         Thought Content: Thought content normal.         Judgment: Judgment normal.         Procedures           ED Course  ED Course as of 23 1527   Wed Mar 22, 2023   1509 MPRESSION:     1. Normal sonographic appearance to the uterus and right ovary for age.  2. 3 cm exophytic left ovarian cyst versus parovarian cyst. Consider  follow-up exam in 6 or 10 weeks.    [BH]      ED Course User Index  [BH] Porfirio Doe PA-C                                           Medical Decision Making    ED COURSE / MEDICAL DECISION MAKIN-year-old female who presents to the emergency department chief complaint lower abdominal pain, vaginal bleeding worsening over the past several days.    Differential diagnosis includes vaginal pregnancy, vaginal discharge, ovarian cyst, hemorrhagic cyst among other etiologies.    Blood work, cmp, cbc, US was ordered for further evaluation of the patient's presentation.    Chart review including any outside testing was performed    Patient was treated with Provera for vaginal bleeding, anti-inflammatories for ovarian cyst    Patient advised follow-up with OB/GYN.    Plan for disposition is discharge    DECISION TO DISCHARGE: see ED care timeline     FINAL IMPRESSION:   1 -- ovarian cyst  2 --vaginal bleeding    3 --     Left ovarian cyst: acute illness or injury  Vaginal bleeding: acute illness or injury  Amount and/or Complexity of Data Reviewed  Labs: ordered.  Radiology: ordered.      Risk  Prescription drug management.          Final diagnoses:   Left ovarian cyst   Vaginal bleeding       ED Disposition  ED Disposition     ED Disposition   Discharge    Condition   Stable     Comment   --             Negin Broussard, APRN  [address] [facility] [address]  [place] [id number]  [phone]    Call in 1 day           Medication List      New Prescriptions    indomethacin 25 MG capsule  Commonly known as: INDOCIN  Take 1 capsule by mouth 2 (Two) Times a Day With Meals.     medroxyPROGESTERone 10 MG tablet  Commonly known as: Provera  Take 1 tablet by mouth Daily for 7 days.           Where to Get Your Medications      These medications were sent to [facility] [id number] - [place], [place] - [facility] [address] AT [facility] - [phone] [facility] [phone]   [address], [facility] [address]    Phone: [phone]   · indomethacin 25 MG capsule  · medroxyPROGESTERone 10 MG tablet          Porfirio Doe PA-C  03/22/23 1526       Porfirio Doe PA-C  03/22/23 1521

## 2023-03-30 ENCOUNTER — OFFICE VISIT (OUTPATIENT)
Dept: OBSTETRICS AND GYNECOLOGY | Facility: CLINIC | Age: 28
End: 2023-03-30
Payer: COMMERCIAL

## 2023-03-30 VITALS — WEIGHT: 192.4 LBS | DIASTOLIC BLOOD PRESSURE: 70 MMHG | SYSTOLIC BLOOD PRESSURE: 110 MMHG | BODY MASS INDEX: 28.41 KG/M2

## 2023-03-30 DIAGNOSIS — N92.1 MENORRHAGIA WITH IRREGULAR CYCLE: Primary | ICD-10-CM

## 2023-03-30 DIAGNOSIS — N94.6 DYSMENORRHEA: ICD-10-CM

## 2023-03-30 RX ORDER — MEDROXYPROGESTERONE ACETATE 10 MG/1
TABLET ORAL
Qty: 10 TABLET | Refills: 6 | Status: SHIPPED | OUTPATIENT
Start: 2023-03-30

## 2023-03-30 NOTE — PROGRESS NOTES
Subjective   Chief Complaint   Patient presents with   • Vaginal Bleeding     New Patient for ER follow up. Has had heavy bleeding. TVS done in ER     Elen Cristofer Oates is a 27 y.o. year old  (C/S x 1)  Patient's last menstrual period was 03/15/2023.  She presents to be seen because of AUB, menses are typically regular 28-30 days, lastings about 4-5 days though heavy and painful, the past year has been off--had mirena for 2 years but was getting migraines with aura--these imrpoved with removal--really starting in the fall aub with heaviness kicked in. Skipped November/Dec/.  And then has had prolonged intermittent heavy bleeding since.  Has done 2 rounds of Provera which has tapered her bleeding and did not trigger headaches.    OTHER COMPLAINTS:  Nothing else    The following portions of the patient's history were reviewed and updated as appropriate:  She  has a past medical history of Abnormal ECG, Abnormal Pap smear of cervix, ADHD (attention deficit hyperactivity disorder), Allergic, Anemia, Anxiety, Cervical dysplasia, Chest pain, Chlamydia, Chronic headaches (2016), Collapse, Depression, Endometriosis, Heart murmur, Hematuria, History of transfusion, Hyperemesis gravidarum, Migraine, Ovarian cyst, Pneumonia (2016), Polycystic ovary syndrome, Scoliosis, and Urinary tract infection.  She does not have any pertinent problems on file.  She  has a past surgical history that includes Tonsillectomy; Adenoidectomy; Cystoscopy; Cystoscopy; Mouth surgery;  section; and Marlin tooth extraction.  Her family history includes Arthritis in her maternal grandmother and paternal grandfather; Cancer in her maternal grandmother, paternal grandfather, and paternal grandmother; Diabetes in her paternal grandfather; Heart attack in her father and paternal grandfather; Heart disease in her father and mother; Hyperlipidemia in her father and mother; Hypertension in her father and mother; Kidney disease  in her maternal grandmother; Liver disease in her father; Stroke in her maternal grandmother and paternal grandfather.  She  reports that she has never smoked. She has never used smokeless tobacco. She reports that she does not drink alcohol and does not use drugs.  Current Outpatient Medications   Medication Sig Dispense Refill   • busPIRone (BUSPAR) 10 MG tablet TAKE ONE TABLET BY MOUTH THREE TIMES A DAY 90 tablet 1   • venlafaxine XR (EFFEXOR-XR) 150 MG 24 hr capsule Take 1 capsule by mouth Daily. 90 capsule 3   • amphetamine-dextroamphetamine (ADDERALL) 10 MG tablet Adderall 10 mg tablet   Take 1 tablet every day by oral route. (Patient not taking: Reported on 3/30/2023)     • cyclobenzaprine (FLEXERIL) 5 MG tablet Take 1 tablet by mouth 3 (Three) Times a Day As Needed for Muscle Spasms. (Patient not taking: Reported on 3/30/2023) 15 tablet 0   • ibuprofen (ADVIL,MOTRIN) 800 MG tablet ibuprofen 800 mg tablet (Patient not taking: Reported on 3/30/2023)     • indomethacin (INDOCIN) 25 MG capsule Take 1 capsule by mouth 2 (Two) Times a Day With Meals. (Patient not taking: Reported on 3/30/2023) 20 capsule 0   • ondansetron ODT (Zofran ODT) 4 MG disintegrating tablet Place 1 tablet on the tongue Every 8 (Eight) Hours As Needed for Nausea. (Patient not taking: Reported on 3/30/2023) 15 tablet 1   • vitamin D (ERGOCALCIFEROL) 1.25 MG (71031 UT) capsule capsule Take 1 capsule by mouth 1 (One) Time Per Week. (Patient not taking: Reported on 3/30/2023) 12 capsule 0     No current facility-administered medications for this visit.     Current Outpatient Medications on File Prior to Visit   Medication Sig   • busPIRone (BUSPAR) 10 MG tablet TAKE ONE TABLET BY MOUTH THREE TIMES A DAY   • venlafaxine XR (EFFEXOR-XR) 150 MG 24 hr capsule Take 1 capsule by mouth Daily.   • amphetamine-dextroamphetamine (ADDERALL) 10 MG tablet Adderall 10 mg tablet   Take 1 tablet every day by oral route. (Patient not taking: Reported on  3/30/2023)   • cyclobenzaprine (FLEXERIL) 5 MG tablet Take 1 tablet by mouth 3 (Three) Times a Day As Needed for Muscle Spasms. (Patient not taking: Reported on 3/30/2023)   • ibuprofen (ADVIL,MOTRIN) 800 MG tablet ibuprofen 800 mg tablet (Patient not taking: Reported on 3/30/2023)   • indomethacin (INDOCIN) 25 MG capsule Take 1 capsule by mouth 2 (Two) Times a Day With Meals. (Patient not taking: Reported on 3/30/2023)   • [] medroxyPROGESTERone (Provera) 10 MG tablet Take 1 tablet by mouth Daily for 7 days.   • ondansetron ODT (Zofran ODT) 4 MG disintegrating tablet Place 1 tablet on the tongue Every 8 (Eight) Hours As Needed for Nausea. (Patient not taking: Reported on 3/30/2023)   • vitamin D (ERGOCALCIFEROL) 1.25 MG (31119 UT) capsule capsule Take 1 capsule by mouth 1 (One) Time Per Week. (Patient not taking: Reported on 3/30/2023)     No current facility-administered medications on file prior to visit.     She is allergic to shellfish allergy, penicillins, and iodine.    Social History    Tobacco Use      Smoking status: Never      Smokeless tobacco: Never    Review of Systems  Consitutional POS: nothing reported    NEG: anorexia or night sweats   Gastointestinal POS: nothing reported    NEG: bloating, change in bowel habits, melena or reflux symptoms   Genitourinary POS: nothing reported    NEG: dysuria or hematuria   Integument POS: nothing reported    NEG: moles that are changing in size, shape, color or rashes   Breast POS: nothing reported    NEG: persistent breast lump, skin dimpling or nipple discharge         Respiratory: negative  Cardiovascular: negative          Objective   /70   Wt 87.3 kg (192 lb 6.4 oz)   LMP 03/15/2023   BMI 28.41 kg/m²     General:  well developed; well nourished  no acute distress   Skin:  No suspicious lesions seen   Thyroid: not examined   Lungs:  breathing is unlabored   Heart:  Not performed.   Breasts:  Not performed.   Abdomen: soft, non-tender; no  masses  no umbilical or inguinal hernias are present  no hepato-splenomegaly   Pelvis: Not performed.     Psychiatric: Alert and oriented ×3, mood and affect appropriate  HEENT: Atraumatic, normocephalic, normal scleral icterus  Extremities: 2+ pulses bilaterally, no edema      Lab Review   CBC, CMP, PRL, TSH and PCO labs and insulin    Imaging   Pelvic ultrasound images independantly reviewed - Uterus is retroflexed.  Endometrium 8 to 10 mm.  Simple right paratubal cyst.  Bilateral ovaries demonstrate a classic PCO appearance with no solid masses.  No free fluid        Assessment   1. PCO appearing ovaries  2. Menometrorrhagia: This is not long-term.  Most recently patient skipped several months and has had this prolonged heavy bleeding.  Her CBC is normal.  PCO labs are normal but her fasting insulin is mildly increased.  3. Dysmenorrhea     Plan   1. Options discussed at length with the patient.  At this time we will start with cyclic Provera q. 35 days if no menses.  2. Metformin 500 mg twice daily  3. Diet exercise discussed at length  4. We will reassess in 4 months here.  We did discuss the potential for diagnostic laparoscopy evaluation of endometriosis as well as hysteroscopy and D&C    No orders of the defined types were placed in this encounter.         This note was electronically signed.      March 30, 2023

## 2023-04-16 ENCOUNTER — APPOINTMENT (OUTPATIENT)
Dept: GENERAL RADIOLOGY | Facility: HOSPITAL | Age: 28
End: 2023-04-16
Payer: COMMERCIAL

## 2023-04-16 ENCOUNTER — HOSPITAL ENCOUNTER (EMERGENCY)
Facility: HOSPITAL | Age: 28
Discharge: HOME | End: 2023-04-16
Attending: EMERGENCY MEDICINE | Admitting: EMERGENCY MEDICINE
Payer: COMMERCIAL

## 2023-04-16 VITALS
DIASTOLIC BLOOD PRESSURE: 68 MMHG | BODY MASS INDEX: 28.79 KG/M2 | WEIGHT: 190 LBS | HEART RATE: 100 BPM | TEMPERATURE: 98.1 F | OXYGEN SATURATION: 97 % | HEIGHT: 68 IN | SYSTOLIC BLOOD PRESSURE: 104 MMHG | RESPIRATION RATE: 17 BRPM

## 2023-04-16 DIAGNOSIS — R55 VASOVAGAL SYNCOPE: ICD-10-CM

## 2023-04-16 DIAGNOSIS — R55 SYNCOPE AND COLLAPSE: Primary | ICD-10-CM

## 2023-04-16 DIAGNOSIS — F41.9 ANXIETY AND DEPRESSION: ICD-10-CM

## 2023-04-16 DIAGNOSIS — F32.A ANXIETY AND DEPRESSION: ICD-10-CM

## 2023-04-16 DIAGNOSIS — Z86.2 HISTORY OF ANEMIA: ICD-10-CM

## 2023-04-16 DIAGNOSIS — Z78.9 CAFFEINE USE: ICD-10-CM

## 2023-04-16 DIAGNOSIS — E83.42 HYPOMAGNESEMIA: ICD-10-CM

## 2023-04-16 LAB
ALBUMIN SERPL-MCNC: 4.5 G/DL (ref 3.5–5.2)
ALBUMIN/GLOB SERPL: 1.5 G/DL
ALP SERPL-CCNC: 102 U/L (ref 39–117)
ALT SERPL W P-5'-P-CCNC: 13 U/L (ref 1–33)
ANION GAP SERPL CALCULATED.3IONS-SCNC: 12 MMOL/L (ref 5–15)
AST SERPL-CCNC: 18 U/L (ref 1–32)
BASOPHILS # BLD AUTO: 0.01 10*3/MM3 (ref 0–0.2)
BASOPHILS NFR BLD AUTO: 0.1 % (ref 0–1.5)
BILIRUB SERPL-MCNC: 0.2 MG/DL (ref 0–1.2)
BILIRUB UR QL STRIP: NEGATIVE
BUN SERPL-MCNC: 11 MG/DL (ref 6–20)
BUN/CREAT SERPL: 13.4 (ref 7–25)
CALCIUM SPEC-SCNC: 9.3 MG/DL (ref 8.6–10.5)
CHLORIDE SERPL-SCNC: 101 MMOL/L (ref 98–107)
CLARITY UR: CLEAR
CO2 SERPL-SCNC: 26 MMOL/L (ref 22–29)
COLOR UR: YELLOW
CREAT SERPL-MCNC: 0.82 MG/DL (ref 0.57–1)
D DIMER PPP FEU-MCNC: 0.4 MCGFEU/ML (ref 0–0.5)
DEPRECATED RDW RBC AUTO: 36.1 FL (ref 37–54)
EGFRCR SERPLBLD CKD-EPI 2021: 100.1 ML/MIN/1.73
EOSINOPHIL # BLD AUTO: 0 10*3/MM3 (ref 0–0.4)
EOSINOPHIL NFR BLD AUTO: 0 % (ref 0.3–6.2)
ERYTHROCYTE [DISTWIDTH] IN BLOOD BY AUTOMATED COUNT: 11.9 % (ref 12.3–15.4)
GLOBULIN UR ELPH-MCNC: 3.1 GM/DL
GLUCOSE SERPL-MCNC: 135 MG/DL (ref 65–99)
GLUCOSE UR STRIP-MCNC: NEGATIVE MG/DL
HCT VFR BLD AUTO: 37 % (ref 34–46.6)
HGB BLD-MCNC: 11.7 G/DL (ref 12–15.9)
HGB UR QL STRIP.AUTO: NEGATIVE
HOLD SPECIMEN: NORMAL
IMM GRANULOCYTES # BLD AUTO: 0.04 10*3/MM3 (ref 0–0.05)
IMM GRANULOCYTES NFR BLD AUTO: 0.4 % (ref 0–0.5)
KETONES UR QL STRIP: ABNORMAL
LEUKOCYTE ESTERASE UR QL STRIP.AUTO: NEGATIVE
LIPASE SERPL-CCNC: 33 U/L (ref 13–60)
LYMPHOCYTES # BLD AUTO: 2.5 10*3/MM3 (ref 0.7–3.1)
LYMPHOCYTES NFR BLD AUTO: 23.3 % (ref 19.6–45.3)
MAGNESIUM SERPL-MCNC: 1.5 MG/DL (ref 1.6–2.6)
MCH RBC QN AUTO: 26.9 PG (ref 26.6–33)
MCHC RBC AUTO-ENTMCNC: 31.6 G/DL (ref 31.5–35.7)
MCV RBC AUTO: 85.1 FL (ref 79–97)
MONOCYTES # BLD AUTO: 0.67 10*3/MM3 (ref 0.1–0.9)
MONOCYTES NFR BLD AUTO: 6.3 % (ref 5–12)
NEUTROPHILS NFR BLD AUTO: 69.9 % (ref 42.7–76)
NEUTROPHILS NFR BLD AUTO: 7.49 10*3/MM3 (ref 1.7–7)
NITRITE UR QL STRIP: NEGATIVE
NRBC BLD AUTO-RTO: 0 /100 WBC (ref 0–0.2)
PH UR STRIP.AUTO: 5.5 [PH] (ref 5–8)
PLATELET # BLD AUTO: 327 10*3/MM3 (ref 140–450)
PMV BLD AUTO: 9.2 FL (ref 6–12)
POTASSIUM SERPL-SCNC: 3.6 MMOL/L (ref 3.5–5.2)
PROT SERPL-MCNC: 7.6 G/DL (ref 6–8.5)
PROT UR QL STRIP: NEGATIVE
RBC # BLD AUTO: 4.35 10*6/MM3 (ref 3.77–5.28)
SODIUM SERPL-SCNC: 139 MMOL/L (ref 136–145)
SP GR UR STRIP: 1.03 (ref 1–1.03)
T4 FREE SERPL-MCNC: 1.04 NG/DL (ref 0.93–1.7)
TROPONIN T SERPL HS-MCNC: <6 NG/L
TSH SERPL DL<=0.05 MIU/L-ACNC: 2.12 UIU/ML (ref 0.27–4.2)
UROBILINOGEN UR QL STRIP: ABNORMAL
WBC NRBC COR # BLD: 10.71 10*3/MM3 (ref 3.4–10.8)
WHOLE BLOOD HOLD COAG: NORMAL
WHOLE BLOOD HOLD SPECIMEN: NORMAL

## 2023-04-16 PROCEDURE — 84484 ASSAY OF TROPONIN QUANT: CPT | Performed by: PHYSICIAN ASSISTANT

## 2023-04-16 PROCEDURE — 96365 THER/PROPH/DIAG IV INF INIT: CPT

## 2023-04-16 PROCEDURE — 83735 ASSAY OF MAGNESIUM: CPT | Performed by: PHYSICIAN ASSISTANT

## 2023-04-16 PROCEDURE — 93005 ELECTROCARDIOGRAM TRACING: CPT | Performed by: EMERGENCY MEDICINE

## 2023-04-16 PROCEDURE — 80050 GENERAL HEALTH PANEL: CPT | Performed by: EMERGENCY MEDICINE

## 2023-04-16 PROCEDURE — 71045 X-RAY EXAM CHEST 1 VIEW: CPT

## 2023-04-16 PROCEDURE — 25010000002 MAGNESIUM SULFATE 2 GM/50ML SOLUTION: Performed by: PHYSICIAN ASSISTANT

## 2023-04-16 PROCEDURE — 83690 ASSAY OF LIPASE: CPT | Performed by: PHYSICIAN ASSISTANT

## 2023-04-16 PROCEDURE — 99284 EMERGENCY DEPT VISIT MOD MDM: CPT

## 2023-04-16 PROCEDURE — 84439 ASSAY OF FREE THYROXINE: CPT | Performed by: PHYSICIAN ASSISTANT

## 2023-04-16 PROCEDURE — 81003 URINALYSIS AUTO W/O SCOPE: CPT | Performed by: EMERGENCY MEDICINE

## 2023-04-16 PROCEDURE — 85379 FIBRIN DEGRADATION QUANT: CPT | Performed by: PHYSICIAN ASSISTANT

## 2023-04-16 RX ORDER — SODIUM CHLORIDE 0.9 % (FLUSH) 0.9 %
10 SYRINGE (ML) INJECTION AS NEEDED
Status: DISCONTINUED | OUTPATIENT
Start: 2023-04-16 | End: 2023-04-17 | Stop reason: HOSPADM

## 2023-04-16 RX ORDER — MAGNESIUM SULFATE HEPTAHYDRATE 40 MG/ML
2 INJECTION, SOLUTION INTRAVENOUS ONCE
Status: COMPLETED | OUTPATIENT
Start: 2023-04-16 | End: 2023-04-16

## 2023-04-16 RX ADMIN — MAGNESIUM SULFATE HEPTAHYDRATE 2 G: 2 INJECTION, SOLUTION INTRAVENOUS at 20:28

## 2023-04-16 RX ADMIN — SODIUM CHLORIDE 1000 ML: 9 INJECTION, SOLUTION INTRAVENOUS at 19:56

## 2023-04-16 NOTE — ED PROVIDER NOTES
Subjective   History of Present Illness  This is a 28-year-old female that presents the ER after syncopal episode that occurred approximately 30 minutes prior to arrival.  Patient, , and son were at [employer] and patient had sudden onset of dizziness, tunnel vision, and nausea with dry heaves.  Patient tried to get to the floor and was on all fours according to .  She was diaphoretic and clammy and then had a syncopal episode.  Patient's  helped her to the ground and there was no injury.  He laid her in recovery position and she came to and continue to have dry heaves.  Patient denies any chest pain or shortness of breath.  She denies any recent symptoms of illness.  Patient says that she has history of syncope that started 6 to 7 years ago.  She had thorough cardiac work-up at [facility] and was diagnosed with vasovagal syncope.  She was started on midodrine and metoprolol.  She tells me that echocardiogram showed dilated cardiomyopathy.  After she had her child 5 years ago, symptoms seemed to resolve.  She was told that she could stop her medications.  Approximately 1 year ago, she started having intermittent syncope, which was most noted during having a bowel movement.   states that patient drinks increased amounts of Dr. Pepper and Janay 8.  Patient also has history of anemia from heavy menses.  No other concerns at this time.  Past medical history is significant for anemia, vasovagal syncope, chronic headaches, endometriosis, Ménière's disease, anxiety/depression, ADHD, and vitamin D deficiency.    History provided by:  Patient and spouse  Syncope  Episode history:  Single  Chronicity:  Recurrent  Context: normal activity (Shopping at [employer])    Context comment:  History of syncope that started 6-7 years ago.  Had thorough cardiac work up at [facility] and dx with vasovagal syncope.  Started on Midodrine and Metoprolol.  Echo showed dilated  cardiomypathy.  After having her child, sxs resolved.  Witnessed: yes (Pt's  witnessed episode at [facility])    Relieved by:  Nothing  Worsened by:  Posture  Ineffective treatments:  None tried  Associated symptoms: diaphoresis, dizziness, nausea and vomiting (Dry heaves)    Associated symptoms: no anxiety, no chest pain, no confusion, no difficulty breathing, no fever, no focal sensory loss, no focal weakness, no headaches, no malaise/fatigue, no palpitations, no recent fall, no recent injury, no shortness of breath, no visual change and no weakness    Risk factors comment:  Patient had her 5 year old son and sxs of syncope resolved for the most part.  Syncope started again last year, especially when having a BM.  Recent initiation of Metformin and pt having some increased indigestion.      Review of Systems   Constitutional: Positive for appetite change and diaphoresis. Negative for fever and malaise/fatigue.   HENT: Negative.  Negative for congestion, ear pain, postnasal drip, rhinorrhea, sinus pressure, sinus pain, sneezing and sore throat.    Eyes: Negative.  Negative for visual disturbance.   Respiratory: Negative.  Negative for chest tightness and shortness of breath.    Cardiovascular: Positive for syncope. Negative for chest pain and palpitations.   Gastrointestinal: Positive for nausea and vomiting (Dry heaves). Negative for abdominal pain, constipation and diarrhea.   Genitourinary: Negative.  Negative for dysuria, flank pain, frequency and urgency.        History of heavy menses, as well as anemia.   Musculoskeletal: Negative.  Negative for arthralgias and back pain.   Neurological: Positive for dizziness and syncope (Syncopal episode just prior to arrival.  History of vasovagal syncope.). Negative for focal weakness, weakness and headaches.   Psychiatric/Behavioral: Negative for confusion.   All other systems reviewed and are negative.      Past Medical History:   Diagnosis Date   • Abnormal ECG     • Abnormal Pap smear of cervix    • ADHD (attention deficit hyperactivity disorder)    • Allergic    • Anemia    • Anxiety    • Cervical dysplasia    • Chest pain    • Chlamydia    • Chronic headaches 2016   • Collapse    • Depression    • Endometriosis    • Heart murmur    • Hematuria    • History of transfusion    • Hyperemesis gravidarum    • Migraine    • Ovarian cyst    • Pneumonia 2016   • Polycystic ovary syndrome    • Scoliosis    • Urinary tract infection        Allergies   Allergen Reactions   • Shellfish Allergy Anaphylaxis   • Penicillins Hives   • Iodine Rash       Past Surgical History:   Procedure Laterality Date   • ADENOIDECTOMY     •  SECTION     • CYSTOSCOPY     • CYSTOSCOPY     • MOUTH SURGERY      wisdom teeth   • TONSILLECTOMY     • WISDOM TOOTH EXTRACTION         Family History   Problem Relation Age of Onset   • Heart disease Mother    • Hypertension Mother    • Hyperlipidemia Mother    • Heart disease Father    • Hypertension Father    • Hyperlipidemia Father    • Liver disease Father    • Heart attack Father    • Arthritis Maternal Grandmother    • Cancer Maternal Grandmother    • Kidney disease Maternal Grandmother    • Stroke Maternal Grandmother    • Cancer Paternal Grandmother    • Cancer Paternal Grandfather    • Stroke Paternal Grandfather    • Arthritis Paternal Grandfather    • Diabetes Paternal Grandfather    • Heart attack Paternal Grandfather        Social History     Socioeconomic History   • Marital status:    Tobacco Use   • Smoking status: Never   • Smokeless tobacco: Never   Vaping Use   • Vaping Use: Never used   Substance and Sexual Activity   • Alcohol use: No   • Drug use: No   • Sexual activity: Yes     Partners: Male     Birth control/protection: None           Objective   Physical Exam  Vitals and nursing note reviewed.   Constitutional:       General: She is not in acute distress.     Appearance: Normal appearance. She is not  ill-appearing, toxic-appearing or diaphoretic.      Comments: No acute sign of pain or distress.  Nontoxic.   HENT:      Head: Normocephalic and atraumatic.      Nose: Nose normal.      Mouth/Throat:      Mouth: Mucous membranes are moist.      Pharynx: Oropharynx is clear.   Eyes:      Extraocular Movements: Extraocular movements intact.      Conjunctiva/sclera: Conjunctivae normal.      Pupils: Pupils are equal, round, and reactive to light.   Neck:      Thyroid: No thyromegaly.      Comments: No thyromegaly  Cardiovascular:      Rate and Rhythm: Regular rhythm. Tachycardia present.  No extrasystoles are present.     Pulses: Normal pulses.      Heart sounds: Normal heart sounds.      Comments: Mild tachycardia.  No ectopy.  No murmurs appreciated.  Pulmonary:      Effort: Pulmonary effort is normal.      Breath sounds: Normal breath sounds.      Comments: Lungs are clear to auscultation bilaterally  Abdominal:      General: Bowel sounds are normal.      Palpations: Abdomen is soft.   Musculoskeletal:         General: Normal range of motion.      Cervical back: Normal range of motion and neck supple.      Right lower leg: No edema.      Left lower leg: No edema.   Skin:     General: Skin is warm and dry.      Coloration: Skin is pale.      Comments: Mild pallor noted.   Neurological:      General: No focal deficit present.      Mental Status: She is alert and oriented to person, place, and time.      Cranial Nerves: Cranial nerves 2-12 are intact.      Sensory: Sensation is intact.      Motor: Motor function is intact.      Coordination: Coordination is intact.      Comments: Neuro intact and nonfocal         Procedures           ED Course  ED Course as of 04/16/23 2239   Sun Apr 16, 2023 2010 EKG shows sinus tachycardia with a rate of 102.  No evidence of ectopy, arrhythmia, or ischemia.  CBC reveals normal white blood cell count at 10.71.  H&H is stable at 11.7 and 37.  Chemistries were completely normal.   Serum glucose is 135.  Magnesium level is 1.5, lipase is 33.  Urinalysis reveals trace ketones but no acute infectious process.  Orthostatics revealed lying blood pressure 113/65 with heart rate 104, sitting blood pressure 122/78 with heart rate 106, standing blood pressure is 131/83 with heart rate 125.  Patient did not have any drop in blood pressure but heart rate increased 19 points and patient reported dizziness and nausea with standing.  We will give 2 g of magnesium sulfate IV and continue with IV fluids. [FC]   2105 Patient started having some increased chest tightness with initiation of IV magnesium, so this was discontinued.  TSH is 2.12 and free T4 is 1.04. [FC]   2145 Reviewed the case in detail with Dr. Doan, ER attending physician.  Patient is tachycardic.  With syncope, we will add on D-dimer to patient's blood work already drawn for further assessment.  O2 sat is 95 to 99% on room air and patient denies any chest pain or shortness of breath other than some tightness after receiving IV magnesium. [FC]   2226 D-dimer is normal at 0.40.  Upon reassessment, patient feeling better after IV fluid bolus.  Heart rate is in the 90s.  We will refer patient closely to our syncope clinic for further assessment.  She has longstanding history of vasovagal syncope.  Recommend patient to increase fluids and avoid sugary drinks like Dr. Pepper and Janay-Lu.  Return to the ER if any worsening symptoms. [FC]   2231 Patient had reported that she was told that she had dilated cardiomyopathy prior to her son being born 5 years ago.  The last echocardiogram I see in epic was from March, 2017 and impression showed normal EF at 52% and no mention of cardiomyopathy or valvular abnormality. [FC]      ED Course User Index  [FC] Arline Paul PA-C            Recent Results (from the past 24 hour(s))   ECG 12 Lead ED Triage Standing Order; Weak / Dizzy / AMS    Collection Time: 04/16/23  7:26 PM   Result Value Ref Range    QT  Interval 338 ms    QTC Interval 440 ms   Comprehensive Metabolic Panel    Collection Time: 04/16/23  7:32 PM    Specimen: Blood   Result Value Ref Range    Glucose 135 (H) 65 - 99 mg/dL    BUN 11 6 - 20 mg/dL    Creatinine 0.82 0.57 - 1.00 mg/dL    Sodium 139 136 - 145 mmol/L    Potassium 3.6 3.5 - 5.2 mmol/L    Chloride 101 98 - 107 mmol/L    CO2 26.0 22.0 - 29.0 mmol/L    Calcium 9.3 8.6 - 10.5 mg/dL    Total Protein 7.6 6.0 - 8.5 g/dL    Albumin 4.5 3.5 - 5.2 g/dL    ALT (SGPT) 13 1 - 33 U/L    AST (SGOT) 18 1 - 32 U/L    Alkaline Phosphatase 102 39 - 117 U/L    Total Bilirubin 0.2 0.0 - 1.2 mg/dL    Globulin 3.1 gm/dL    A/G Ratio 1.5 g/dL    BUN/Creatinine Ratio 13.4 7.0 - 25.0    Anion Gap 12.0 5.0 - 15.0 mmol/L    eGFR 100.1 >60.0 mL/min/1.73   Green Top (Gel)    Collection Time: 04/16/23  7:32 PM   Result Value Ref Range    Extra Tube Hold for add-ons.    Lavender Top    Collection Time: 04/16/23  7:32 PM   Result Value Ref Range    Extra Tube hold for add-on    Gold Top - SST    Collection Time: 04/16/23  7:32 PM   Result Value Ref Range    Extra Tube Hold for add-ons.    Light Blue Top    Collection Time: 04/16/23  7:32 PM   Result Value Ref Range    Extra Tube Hold for add-ons.    CBC Auto Differential    Collection Time: 04/16/23  7:32 PM    Specimen: Blood   Result Value Ref Range    WBC 10.71 3.40 - 10.80 10*3/mm3    RBC 4.35 3.77 - 5.28 10*6/mm3    Hemoglobin 11.7 (L) 12.0 - 15.9 g/dL    Hematocrit 37.0 34.0 - 46.6 %    MCV 85.1 79.0 - 97.0 fL    MCH 26.9 26.6 - 33.0 pg    MCHC 31.6 31.5 - 35.7 g/dL    RDW 11.9 (L) 12.3 - 15.4 %    RDW-SD 36.1 (L) 37.0 - 54.0 fl    MPV 9.2 6.0 - 12.0 fL    Platelets 327 140 - 450 10*3/mm3    Neutrophil % 69.9 42.7 - 76.0 %    Lymphocyte % 23.3 19.6 - 45.3 %    Monocyte % 6.3 5.0 - 12.0 %    Eosinophil % 0.0 (L) 0.3 - 6.2 %    Basophil % 0.1 0.0 - 1.5 %    Immature Grans % 0.4 0.0 - 0.5 %    Neutrophils, Absolute 7.49 (H) 1.70 - 7.00 10*3/mm3    Lymphocytes, Absolute  2.50 0.70 - 3.10 10*3/mm3    Monocytes, Absolute 0.67 0.10 - 0.90 10*3/mm3    Eosinophils, Absolute 0.00 0.00 - 0.40 10*3/mm3    Basophils, Absolute 0.01 0.00 - 0.20 10*3/mm3    Immature Grans, Absolute 0.04 0.00 - 0.05 10*3/mm3    nRBC 0.0 0.0 - 0.2 /100 WBC   Lipase    Collection Time: 04/16/23  7:32 PM    Specimen: Blood   Result Value Ref Range    Lipase 33 13 - 60 U/L   Magnesium    Collection Time: 04/16/23  7:32 PM    Specimen: Blood   Result Value Ref Range    Magnesium 1.5 (L) 1.6 - 2.6 mg/dL   Single High Sensitivity Troponin T    Collection Time: 04/16/23  7:32 PM    Specimen: Blood   Result Value Ref Range    HS Troponin T <6 <10 ng/L   TSH    Collection Time: 04/16/23  7:32 PM    Specimen: Blood   Result Value Ref Range    TSH 2.120 0.270 - 4.200 uIU/mL   T4, Free    Collection Time: 04/16/23  7:32 PM    Specimen: Blood   Result Value Ref Range    Free T4 1.04 0.93 - 1.70 ng/dL   D-dimer, Quantitative    Collection Time: 04/16/23  7:32 PM    Specimen: Blood   Result Value Ref Range    D-Dimer, Quantitative 0.40 0.00 - 0.50 MCGFEU/mL   Urinalysis With Microscopic If Indicated (No Culture) - Urine, Clean Catch    Collection Time: 04/16/23  7:54 PM    Specimen: Urine, Clean Catch   Result Value Ref Range    Color, UA Yellow Yellow, Straw    Appearance, UA Clear Clear    pH, UA 5.5 5.0 - 8.0    Specific Gravity, UA 1.026 1.001 - 1.030    Glucose, UA Negative Negative    Ketones, UA Trace (A) Negative    Bilirubin, UA Negative Negative    Blood, UA Negative Negative    Protein, UA Negative Negative    Leuk Esterase, UA Negative Negative    Nitrite, UA Negative Negative    Urobilinogen, UA 0.2 E.U./dL 0.2 - 1.0 E.U./dL     Note: In addition to lab results from this visit, the labs listed above may include labs taken at another facility or during a different encounter within the last 24 hours. Please correlate lab times with ED admission and discharge times for further clarification of the services performed  during this visit.    XR Chest 1 View   Final Result      1.  Normal chest       Electronically signed by:  Misael Brower M.D.     4/16/2023 8:32 PM Mountain Time        Vitals:    04/16/23 1949 04/16/23 2000 04/16/23 2100 04/16/23 2200   BP: 131/83 123/80 119/60 104/68   Patient Position: Standing      Pulse: (!) 125 105 104 100   Resp:       Temp:       TempSrc:       SpO2:  99% 95% 97%   Weight:       Height:         Medications   sodium chloride 0.9 % flush 10 mL (has no administration in time range)   sodium chloride 0.9 % bolus 1,000 mL (0 mL Intravenous Stopped 4/16/23 2236)   magnesium sulfate 2g/50 mL (PREMIX) infusion (0 g Intravenous Stopped 4/16/23 2057)     ECG/EMG Results (last 24 hours)     Procedure Component Value Units Date/Time    ECG 12 Lead ED Triage Standing Order; Weak / Dizzy / AMS [[id number]] Collected: 04/16/23 1926     Updated: 04/16/23 1927     QT Interval 338 ms      QTC Interval 440 ms     Narrative:      Test Reason : ED Triage Standing Order~  Blood Pressure :   */*   mmHG  Vent. Rate : 102 BPM     Atrial Rate : 102 BPM     P-R Int : 138 ms          QRS Dur :  72 ms      QT Int : 338 ms       P-R-T Axes :  66  34  46 degrees     QTc Int : 440 ms    Sinus tachycardia  Otherwise normal ECG  When compared with ECG of 05-FEB-2017 14:31,  No significant change was found    Referred By: EDMD           Confirmed By:         ECG 12 Lead ED Triage Standing Order; Weak / Dizzy / AMS   Preliminary Result   Test Reason : ED Triage Standing Order~   Blood Pressure :   */*   mmHG   Vent. Rate : 102 BPM     Atrial Rate : 102 BPM      P-R Int : 138 ms          QRS Dur :  72 ms       QT Int : 338 ms       P-R-T Axes :  66  34  46 degrees      QTc Int : 440 ms      Sinus tachycardia   Otherwise normal ECG   When compared with ECG of 05-FEB-2017 14:31,   No significant change was found      Referred By: EDMD           Confirmed By:                                             MDM    Final  diagnoses:   Syncope and collapse   Vasovagal syncope   Hypomagnesemia   History of anemia   Caffeine use   Anxiety and depression       ED Disposition  ED Disposition     ED Disposition   Discharge    Condition   Stable    Comment   --             Negin Broussard, APRN  [address]  [facility] [address] [phone]    Schedule an appointment as soon as possible for a visit in 2 days  Close PCP follow-up for recheck    [facility] Emergency Department  [address] [address] [phone]    If symptoms worsen    [facility] CARDIOLOGY  [address]  [facility] [address] [phone]             Medication List      No changes were made to your prescriptions during this visit.          Arline Paul PA-C  04/16/23 7252

## 2023-04-16 NOTE — Clinical Note
[facility] EMERGENCY DEPARTMENT  [address] [address]  Phone: [phone]    Elen Oates was seen and treated in our emergency department on 4/16/2023.  She may return to work on 04/18/2023.         Thank you for choosing [facility].    Arline Paul, NELLIE

## 2023-04-17 NOTE — DISCHARGE INSTRUCTIONS
ER evaluation revealed normal CBC and chemistries other than mild decrease in magnesium at level of 1.5.  Normal level is 1.6.  We also checked hemoglobin and hematocrit.  Hemoglobin is 11.7 with normal level of 12.  EKG and troponin were within normal limits.  Patient had some tachycardia and this improved with IV fluids.  Recommend patient to decrease intake of soda and increase water intake.  With history of vasovagal syncope and increased frequency of syncope, we will refer patient closely to our syncope clinic.  Encourage fluids and rest.  Return to the ER if worsening symptoms.

## 2023-04-19 LAB
QT INTERVAL: 338 MS
QTC INTERVAL: 440 MS

## 2023-05-05 ENCOUNTER — OFFICE VISIT (OUTPATIENT)
Dept: INTERNAL MEDICINE | Facility: CLINIC | Age: 28
End: 2023-05-05
Payer: COMMERCIAL

## 2023-05-05 ENCOUNTER — HOSPITAL ENCOUNTER (OUTPATIENT)
Dept: CARDIOLOGY | Facility: HOSPITAL | Age: 28
Discharge: HOME | End: 2023-05-05
Payer: COMMERCIAL

## 2023-05-05 ENCOUNTER — OFFICE VISIT (OUTPATIENT)
Dept: CARDIOLOGY | Facility: HOSPITAL | Age: 28
End: 2023-05-05
Payer: COMMERCIAL

## 2023-05-05 VITALS
TEMPERATURE: 96.9 F | DIASTOLIC BLOOD PRESSURE: 62 MMHG | WEIGHT: 192 LBS | SYSTOLIC BLOOD PRESSURE: 110 MMHG | HEART RATE: 105 BPM | OXYGEN SATURATION: 98 % | HEIGHT: 68 IN | BODY MASS INDEX: 29.1 KG/M2

## 2023-05-05 VITALS
BODY MASS INDEX: 29.7 KG/M2 | OXYGEN SATURATION: 97 % | SYSTOLIC BLOOD PRESSURE: 115 MMHG | RESPIRATION RATE: 20 BRPM | DIASTOLIC BLOOD PRESSURE: 74 MMHG | HEART RATE: 104 BPM | TEMPERATURE: 97.4 F | HEIGHT: 68 IN | WEIGHT: 196 LBS

## 2023-05-05 DIAGNOSIS — G47.9 SLEEP DISTURBANCE: ICD-10-CM

## 2023-05-05 DIAGNOSIS — R00.2 PALPITATIONS: ICD-10-CM

## 2023-05-05 DIAGNOSIS — R55 SYNCOPE AND COLLAPSE: ICD-10-CM

## 2023-05-05 DIAGNOSIS — R55 VASOVAGAL SYNCOPE: Primary | ICD-10-CM

## 2023-05-05 DIAGNOSIS — R55 SYNCOPE AND COLLAPSE: Primary | ICD-10-CM

## 2023-05-05 DIAGNOSIS — R06.83 SNORING: ICD-10-CM

## 2023-05-05 RX ORDER — PROPRANOLOL HYDROCHLORIDE 10 MG/1
10 TABLET ORAL 2 TIMES DAILY
Qty: 60 TABLET | Refills: 3 | Status: SHIPPED | OUTPATIENT
Start: 2023-05-05

## 2023-05-05 RX ORDER — MIDODRINE HYDROCHLORIDE 2.5 MG/1
2.5 TABLET ORAL 2 TIMES DAILY
Qty: 60 TABLET | Refills: 3 | Status: SHIPPED | OUTPATIENT
Start: 2023-05-05

## 2023-05-05 NOTE — PROGRESS NOTES
"[facility], [facility] Heart and Vascular    Chief Complaint  Loss of Consciousness and Establish Care    Subjective    History of Present Illness {CC  Problem List  Visit  Diagnosis   Encounters  Notes  Medications  Labs  Result Review Imaging  Media :23}     Elen Oates presents to [facility] CARDIOLOGY for   History of Present Illness     28-year-old female presented to [facility] ED on 4/16/2023 with complaints of syncope.  Sudden onset of dizziness, nausea, \"dry heeves\", syncope while shopping in [employer].  Associated diaphoresis.    Denies chest pain, pressure, dyspnea.    History of syncope age 19 -20 with vasovagal syncope/orthostatic syncope.    Had been on metoprolol and midodrine. During pregnancy her s/s improved. BB and midodrine was stopped. Her third trimester her s/s worsened again. Reports hx of echo and reports DCM meds restarted with improved s/s.  Episodes improved for 1-2 years.  Restarted again with BMs. This last episoded was sudden and no known triggers.      Patient drinks caffeine regularly.  History of anemia and heavy menses.  History of chronic headaches, endometriosis, Ménière's disease, anxiety/depression, ADHD, vitamin D deficiency.    During her ED visit magnesium level was low.  Given magnesium replacement.  Given IV fluids.  Patient noted to have chest discomfort with IV infusion and it was discontinued.  A few days later she developed chest and back discomfort and went to  and CXR showed possible PNA.     Palpitations daily.  Pt reports \"thumps and pauses that are painful\".      Increased dyspnea since covid last year.     With standing too fast will get dizziness, nausea.   But can occur at sitting as well.      Pt reports weight gain.      Poor quality of sleep, snoring, wakes with dyspnea, CP, palpitations and gasping.     Objective     Vital Signs:   Vitals:    05/05/23 1528 05/05/23 1530 05/05/23 1531 " "  BP: 117/69 115/75 115/74   BP Location: Right arm Left arm Left arm   Patient Position: Sitting Standing Sitting   Cuff Size: Adult Adult Adult   Pulse: 104 116 104   Resp:   20   Temp:   97.4 °F (36.3 °C)   TempSrc:   Temporal   SpO2: 98% 98% 97%   Weight:   88.9 kg (196 lb)   Height:   172.7 cm (68\")     Body mass index is 29.8 kg/m².  Physical Exam  Vitals reviewed.   Constitutional:       General: She is not in acute distress.     Appearance: Normal appearance.   Cardiovascular:      Rate and Rhythm: Normal rate and regular rhythm.      Heart sounds: Normal heart sounds.   Pulmonary:      Effort: Pulmonary effort is normal.      Breath sounds: Normal breath sounds.   Musculoskeletal:      Right lower leg: No edema.      Left lower leg: No edema.   Skin:     General: Skin is warm and dry.   Neurological:      Mental Status: She is alert.   Psychiatric:         Mood and Affect: Mood normal.         Behavior: Behavior is cooperative.              Result Review  Data Reviewed:{ Labs  Result Review  Imaging  Med Tab  Media :23}   Echocardiogram 2017: EF 52% no concerning valvular disease    Treadmill stress test 3/16/2017: No ischemia    48-hour Holter 2017: Average heart rate 72 bpm, predominant rhythm sinus.  Rare PACs.  Patient reported symptoms associated with sinus.    Admission on 04/19/2023, Discharged on 04/19/2023   Component Date Value Ref Range Status   • Color 04/19/2023 Yellow   Final   • Clarity, UA 04/19/2023 Cloudy (A)   Final   • Glucose, UA 04/19/2023 Negative  mg/dL Final   • Bilirubin 04/19/2023 Negative   Final   • Ketones, UA 04/19/2023 Negative   Final   • Specific Gravity  04/19/2023 1.020  1.005 - 1.030 Final   • Blood, UA 04/19/2023 Negative   Final   • pH, Urine 04/19/2023 7.0  5.0 - 8.0 Final   • Protein, POC 04/19/2023 Negative  mg/dL Final   • Urobilinogen, UA 04/19/2023 1 E.U./dL (A)   Final   • Nitrite, UA 04/19/2023 Negative   Final   • Leukocytes 04/19/2023 Small (1+) (A)   " Final   • HCG, Urine, QL 04/19/2023 Negative   Final   • Lot Number 04/19/2023 [id number]   Final   • Internal Positive Control 04/19/2023 Passed   Final   • Internal Negative Control 04/19/2023 Passed   Final   • Expiration Date 04/19/2023 1,[id number]   Final   • Urine Culture 04/19/2023 Final report   Final   • Result 1 04/19/2023 Comment   Final    Mixed urogenital laury  25,000-50,000 colony forming units per mL     Lab Results   Component Value Date    WBC 10.71 04/16/2023    HGB 11.7 (L) 04/16/2023    HCT 37.0 04/16/2023    MCV 85.1 04/16/2023     04/16/2023     Lab Results   Component Value Date    GLUCOSE 135 (H) 04/16/2023    CALCIUM 9.3 04/16/2023     04/16/2023    K 3.6 04/16/2023    CO2 26.0 04/16/2023     04/16/2023    BUN 11 04/16/2023    CREATININE 0.82 04/16/2023    EGFRRESULT 106.9 07/19/2022    EGFR 100.1 04/16/2023    BCR 13.4 04/16/2023    ANIONGAP 12.0 04/16/2023     Lab Results   Component Value Date    TSH 2.120 04/16/2023     Lab Results   Component Value Date    CHLPL 172 07/19/2022    CHLPL 163 10/20/2020    CHLPL 160 01/23/2019     Lab Results   Component Value Date    TRIG 60 07/19/2022    TRIG 59 10/20/2020    TRIG 96 01/23/2019     Lab Results   Component Value Date    HDL 46 07/19/2022    HDL 47 10/20/2020    HDL 39 (L) 01/23/2019     Lab Results   Component Value Date     (H) 07/19/2022     (H) 10/20/2020     (H) 01/23/2019                   Assessment and Plan {CC Problem List  Visit Diagnosis  ROS  Review (Popup)  Health Maintenance  Quality  BestPractice  Medications  SmartSets  SnapShot Encounters  Media :23}   1. Syncope and collapse    - Adult Transthoracic Echo Complete W/ Cont if Necessary Per Protocol; Future    - Mobile Cardiac Outpatient Telemetry; Future    - midodrine (PROAMATINE) 2.5 MG tablet; Take 1 tablet by mouth 2 (Two) Times a Day.  Dispense: 60 tablet; Refill: 3  - propranolol (INDERAL) 10 MG tablet; Take 1 tablet  by mouth 2 (Two) Times a Day.  Dispense: 60 tablet; Refill: 3    2. Palpitations    - Adult Transthoracic Echo Complete W/ Cont if Necessary Per Protocol; Future  - Mobile Cardiac Outpatient Telemetry; Future  - midodrine (PROAMATINE) 2.5 MG tablet; Take 1 tablet by mouth 2 (Two) Times a Day.  Dispense: 60 tablet; Refill: 3  - propranolol (INDERAL) 10 MG tablet; Take 1 tablet by mouth 2 (Two) Times a Day.  Dispense: 60 tablet; Refill: 3    3. Snoring    - Ambulatory Referral to Sleep Medicine    4. Sleep disturbance    - Ambulatory Referral to Sleep Medicine          Follow Up {Instructions Charge Capture  Follow-up Communications :23}   Return in about 2 weeks (around 5/19/2023), or if symptoms worsen or fail to improve, for Video visit, near syncope/syncope/dizziness, palpitations.    Patient was given instructions and counseling regarding her condition or for health maintenance advice. Please see specific information pulled into the AVS if appropriate.  Patient was instructed to call the [facility] with any questions, concerns, or worsening symptoms.

## 2023-05-05 NOTE — PROGRESS NOTES
[facility] Heart Monitor Documentation    Elen Oates  1995  [id number]  05/05/23      [] ZIO XT Patch  Model [id number] Prescribed for N/A Days    · Serial Number: (N + 9 Digits) N   · Apply-By Date on Box:   · USPS Tracking Number:   · USPS Tracking        [] [employer] MINI PLUS Mobile Cardiac Telemetry  Model BGMINIPLUS Prescribed for 30 Days    · Serial Number: (BGM + 7 Digits) [id number]  · Shipped-By Date on Box: 413511  · UPS Tracking Number: [id number]  · UPS Tracking      [] [employer] MINI Holter Monitor  Model BGMINIEL Prescribed for N/A Days    · Serial Number: (7 Digits)   · Shipped-By Date on Box:   · UPS Tracking Number: [id number]  · UPS Tracking        This monitor was applied to the patient's chest and checked for proper functioning.  Ms. Elen Oates was instructed in the proper use of this monitor.  She was given the opportunity to ask questions and left the office with the device 's instruction manual.    Elvi Bourgeois MA, 16:05 EDT, 05/05/23                  [facility]

## 2023-05-05 NOTE — PROGRESS NOTES
"Subjective  Elen Cristofer Oates is a 28 y.o. female    HPI coming in for follow-up recent episode of lightheadedness and syncope.  In a patient who has had similar episodes in the past.  She denies preceding chest pain or palpitation no recent nausea vomiting or diarrhea.  She is not on any new medication.  Has had extensive work-up done for this in the past.  She has been advised to stay well-hydrated.  Denies illicit drug use or significant alcohol use    The following portions of the patient's history were reviewed and updated as appropriate: allergies, current medications, past family history, past medical history, past social history, past surgical history, and problem list.     Review of Systems   Constitutional: Negative.  Negative for activity change, appetite change, fatigue and fever.   HENT: Negative for congestion, ear discharge, ear pain and trouble swallowing.    Eyes: Negative for photophobia and visual disturbance.   Respiratory: Negative for cough and shortness of breath.    Cardiovascular: Negative for chest pain and palpitations.   Gastrointestinal: Negative for abdominal distention, abdominal pain, constipation, diarrhea, nausea and vomiting.   Endocrine: Negative.    Genitourinary: Negative for dysuria, hematuria and urgency.   Musculoskeletal: Negative for arthralgias, back pain, joint swelling and myalgias.   Skin: Negative for color change and rash.   Allergic/Immunologic: Negative.    Neurological: Negative for dizziness, weakness, light-headedness and headaches.   Hematological: Negative for adenopathy. Does not bruise/bleed easily.   Psychiatric/Behavioral: Negative for agitation, confusion and dysphoric mood. The patient is not nervous/anxious.        Visit Vitals  /62   Pulse 105   Temp 96.9 °F (36.1 °C)   Ht 172.7 cm (68\")   Wt 87.1 kg (192 lb)   LMP 04/12/2023 (Approximate)   SpO2 98%   BMI 29.19 kg/m²       Objective  Physical Exam  Constitutional:       General: She is not in " acute distress.     Appearance: She is well-developed.   HENT:      Nose: Nose normal.   Eyes:      General: No scleral icterus.     Conjunctiva/sclera: Conjunctivae normal.   Neck:      Thyroid: No thyromegaly.      Trachea: No tracheal deviation.   Cardiovascular:      Rate and Rhythm: Normal rate and regular rhythm.      Heart sounds: No murmur heard.    No friction rub.   Pulmonary:      Effort: No respiratory distress.      Breath sounds: No wheezing or rales.   Abdominal:      General: There is no distension.      Palpations: Abdomen is soft. There is no mass.      Tenderness: There is no abdominal tenderness. There is no guarding.   Musculoskeletal:         General: No deformity. Normal range of motion.   Lymphadenopathy:      Cervical: No cervical adenopathy.   Skin:     General: Skin is warm and dry.      Findings: No erythema or rash.   Neurological:      Mental Status: She is alert and oriented to person, place, and time.      Cranial Nerves: No cranial nerve deficit.      Coordination: Coordination normal.      Deep Tendon Reflexes: Reflexes are normal and symmetric.   Psychiatric:         Behavior: Behavior normal.         Thought Content: Thought content normal.         Judgment: Judgment normal.         Diagnoses and all orders for this visit:    Vasovagal syncope encouraged adequate hydration has an appointment with cardiology.  Medications reviewed

## 2023-05-09 ENCOUNTER — OFFICE VISIT (OUTPATIENT)
Dept: GASTROENTEROLOGY | Facility: CLINIC | Age: 28
End: 2023-05-09
Payer: COMMERCIAL

## 2023-05-09 ENCOUNTER — PATIENT ROUNDING (BHMG ONLY) (OUTPATIENT)
Dept: GASTROENTEROLOGY | Facility: CLINIC | Age: 28
End: 2023-05-09
Payer: COMMERCIAL

## 2023-05-09 VITALS
OXYGEN SATURATION: 98 % | BODY MASS INDEX: 29.5 KG/M2 | WEIGHT: 194 LBS | HEART RATE: 96 BPM | SYSTOLIC BLOOD PRESSURE: 100 MMHG | DIASTOLIC BLOOD PRESSURE: 60 MMHG

## 2023-05-09 DIAGNOSIS — R10.84 GENERALIZED ABDOMINAL PAIN: ICD-10-CM

## 2023-05-09 DIAGNOSIS — R11.0 NAUSEA: ICD-10-CM

## 2023-05-09 DIAGNOSIS — K21.9 GASTROESOPHAGEAL REFLUX DISEASE, UNSPECIFIED WHETHER ESOPHAGITIS PRESENT: ICD-10-CM

## 2023-05-09 DIAGNOSIS — R55 SYNCOPE, UNSPECIFIED SYNCOPE TYPE: ICD-10-CM

## 2023-05-09 DIAGNOSIS — R19.4 CHANGE IN BOWEL HABITS: ICD-10-CM

## 2023-05-09 DIAGNOSIS — K59.04 CHRONIC IDIOPATHIC CONSTIPATION: Primary | ICD-10-CM

## 2023-05-09 RX ORDER — FAMOTIDINE 40 MG/1
40 TABLET, FILM COATED ORAL 2 TIMES DAILY
Qty: 60 TABLET | Refills: 3 | Status: SHIPPED | OUTPATIENT
Start: 2023-05-09

## 2023-05-09 RX ORDER — SODIUM CHLORIDE 9 MG/ML
30 INJECTION, SOLUTION INTRAVENOUS CONTINUOUS PRN
OUTPATIENT
Start: 2023-05-09

## 2023-05-09 RX ORDER — SODIUM, POTASSIUM,MAG SULFATES 17.5-3.13G
SOLUTION, RECONSTITUTED, ORAL ORAL
Qty: 354 ML | Refills: 0 | Status: SHIPPED | OUTPATIENT
Start: 2023-05-09

## 2023-05-09 NOTE — PROGRESS NOTES
May 9, 2023    Hello, may I speak with Elen Oates?    My name is Shelbie COOK    I am  with [facility]  [address]  [place] [place] [facility] [address] [phone].    Before we get started may I verify your date of birth? 1995    I am calling to officially welcome you to our practice and ask about your recent visit. Is this a good time to talk?     Tell me about your visit with us. What things went well?        We're always looking for ways to make our patients' experiences even better. Do you have recommendations on ways we may improve?      Overall were you satisfied with your first visit to our practice?        I appreciate you taking the time to speak with me today. Is there anything else I can do for you?       Thank you, and have a great day.

## 2023-05-09 NOTE — PROGRESS NOTES
New Patient Consult      Date: 2023   Patient Name: Elen Oates  MRN: [id number]  : 1995     Primary Care Provider: Negin Broussard APRN  Referring Provider: Melva    Chief Complaint   Patient presents with   • Constipation     History of Present Illness: Elen Oates is a 28 y.o. female who is here today as a consultation with Gastroenterology for evaluation of Constipation.    Has BMs every 1.5 weeks to 2 weeks. She has tried miralax, magnesium citrate, stool softeners, increasing fiber, probiotics, enemas all without complete relief. She has been prescribed linzess in the past but told it was not covered. She has had more than 10 syncopal episodes in association with bowel movements in the past. She has had constipation and abdominal pain for approx 7 years now. Prior to this, she had regular daily bowel habits, even had diarrhea in the past. She had her first syncopal episode around the time she was diagnosed with H pylori infection with labs but was not treated. Never had EGD or colonoscopy.     Has menorrhagia. Has had recent labs with PCP. She follows with GYN, has suspected PCOS. She was placed on metformin and provera for this. Has gained 65 lbs in the past 3 years, before her pregnancy she weighed 110 lbs. Has tried losing weight without success.      Also following with cardiology, wearing heart monitor now. They think she has POTS per her report.     Has had increased reflux which is very severe, takes otc prilsoec, last taken approx 1 week ago. Has noticed reflux worse with worse constipation. She has taken benadryl which helps her nausea, takes a nap which helps.     Subjective      Past Medical History:   Diagnosis Date   • Abnormal ECG    • Abnormal Pap smear of cervix    • ADHD (attention deficit hyperactivity disorder)    • Allergic    • Anemia    • Anxiety    • Cervical dysplasia    • Chest pain    • Chlamydia    • Chronic headaches 2016   • Collapse     • Depression    • Endometriosis    • Heart murmur    • Hematuria    • History of transfusion    • Hyperemesis gravidarum    • Migraine    • Ovarian cyst    • Pneumonia 2016   • Polycystic ovary syndrome    • Scoliosis    • Urinary tract infection      Past Surgical History:   Procedure Laterality Date   • ADENOIDECTOMY     •  SECTION     • CYSTOSCOPY     • CYSTOSCOPY     • MOUTH SURGERY      wisdom teeth   • TONSILLECTOMY     • WISDOM TOOTH EXTRACTION       Family History   Problem Relation Age of Onset   • Heart disease Mother    • Hypertension Mother    • Hyperlipidemia Mother    • Heart disease Father    • Hypertension Father    • Hyperlipidemia Father    • Liver disease Father    • Heart attack Father    • Arthritis Maternal Grandmother    • Cancer Maternal Grandmother    • Kidney disease Maternal Grandmother    • Stroke Maternal Grandmother    • Cancer Paternal Grandmother    • Cancer Paternal Grandfather    • Stroke Paternal Grandfather    • Arthritis Paternal Grandfather    • Diabetes Paternal Grandfather    • Heart attack Paternal Grandfather      Social History     Socioeconomic History   • Marital status:    Tobacco Use   • Smoking status: Never   • Smokeless tobacco: Never   Vaping Use   • Vaping Use: Never used   Substance and Sexual Activity   • Alcohol use: No   • Drug use: No   • Sexual activity: Yes     Partners: Male     Birth control/protection: None     Current Outpatient Medications:   •  busPIRone (BUSPAR) 10 MG tablet, TAKE ONE TABLET BY MOUTH THREE TIMES A DAY, Disp: 90 tablet, Rfl: 1  •  metFORMIN (Glucophage) 500 MG tablet, One po qday x 2 weeks then BID thereafter, Disp: 30 tablet, Rfl: 11  •  Probiotic Product (PROBIOTIC BLEND PO), Take  by mouth., Disp: , Rfl:   •  venlafaxine XR (EFFEXOR-XR) 150 MG 24 hr capsule, Take 1 capsule by mouth Daily., Disp: 90 capsule, Rfl: 3    Allergies   Allergen Reactions   • Shellfish Allergy Anaphylaxis   • Penicillins Hives   •  Iodine Rash     The following portions of the patient's history were reviewed and updated as appropriate: allergies, current medications, past family history, past medical history, past social history, past surgical history and problem list.    Objective     Physical Exam  Vitals reviewed.   Constitutional:       General: She is not in acute distress.     Appearance: Normal appearance. She is well-developed. She is not ill-appearing or diaphoretic.   HENT:      Head: Normocephalic and atraumatic.      Right Ear: External ear normal.      Left Ear: External ear normal.      Nose: Nose normal.   Eyes:      General: No scleral icterus.        Right eye: No discharge.         Left eye: No discharge.      Conjunctiva/sclera: Conjunctivae normal.   Neck:      Vascular: No JVD.   Cardiovascular:      Rate and Rhythm: Regular rhythm. Tachycardia present.      Heart sounds: Normal heart sounds. No murmur heard.    No friction rub. No gallop.      Comments: Wearing heart monitor  Pulmonary:      Effort: Pulmonary effort is normal. No respiratory distress.      Breath sounds: Normal breath sounds. No wheezing or rales.   Chest:      Chest wall: No tenderness.   Abdominal:      General: Bowel sounds are normal. There is no distension.      Palpations: Abdomen is soft. There is no mass.      Tenderness: There is abdominal tenderness (epigastric, periumbilical and bilateral lower quadrants, moderate). There is no guarding.   Musculoskeletal:         General: No deformity. Normal range of motion.      Cervical back: Normal range of motion.   Skin:     General: Skin is warm and dry.      Findings: No erythema or rash.      Comments: tattoos   Neurological:      Mental Status: She is alert and oriented to person, place, and time.      Coordination: Coordination normal.   Psychiatric:         Mood and Affect: Mood normal.         Behavior: Behavior normal.         Thought Content: Thought content normal.         Judgment: Judgment  normal.       Vitals:    05/09/23 1026   BP: 100/60   Pulse: 96   SpO2: 98%   Weight: 88 kg (194 lb)     Results Review:   I have reviewed the patient's new clinical and imaging results.    • Glucose 04/16/2023 135 (H)  65 - 99 mg/dL Final   • BUN 04/16/2023 11  6 - 20 mg/dL Final   • Creatinine 04/16/2023 0.82  0.57 - 1.00 mg/dL Final   • Sodium 04/16/2023 139  136 - 145 mmol/L Final   • Potassium 04/16/2023 3.6  3.5 - 5.2 mmol/L Final    Slight hemolysis detected by analyzer. Results may be affected.   • Chloride 04/16/2023 101  98 - 107 mmol/L Final   • CO2 04/16/2023 26.0  22.0 - 29.0 mmol/L Final   • Calcium 04/16/2023 9.3  8.6 - 10.5 mg/dL Final   • Total Protein 04/16/2023 7.6  6.0 - 8.5 g/dL Final   • Albumin 04/16/2023 4.5  3.5 - 5.2 g/dL Final   • ALT (SGPT) 04/16/2023 13  1 - 33 U/L Final   • AST (SGOT) 04/16/2023 18  1 - 32 U/L Final   • Alkaline Phosphatase 04/16/2023 102  39 - 117 U/L Final   • Total Bilirubin 04/16/2023 0.2  0.0 - 1.2 mg/dL Final   • Globulin 04/16/2023 3.1  gm/dL Final    Calculated Result   • A/G Ratio 04/16/2023 1.5  g/dL Final   • BUN/Creatinine Ratio 04/16/2023 13.4  7.0 - 25.0 Final   • Anion Gap 04/16/2023 12.0  5.0 - 15.0 mmol/L Final   • eGFR 04/16/2023 100.1  >60.0 mL/min/1.73 Final   • WBC 04/16/2023 10.71  3.40 - 10.80 10*3/mm3 Final   • RBC 04/16/2023 4.35  3.77 - 5.28 10*6/mm3 Final   • Hemoglobin 04/16/2023 11.7 (L)  12.0 - 15.9 g/dL Final   • Hematocrit 04/16/2023 37.0  34.0 - 46.6 % Final   • MCV 04/16/2023 85.1  79.0 - 97.0 fL Final   • MCH 04/16/2023 26.9  26.6 - 33.0 pg Final   • MCHC 04/16/2023 31.6  31.5 - 35.7 g/dL Final   • RDW 04/16/2023 11.9 (L)  12.3 - 15.4 % Final   • RDW-SD 04/16/2023 36.1 (L)  37.0 - 54.0 fl Final   • MPV 04/16/2023 9.2  6.0 - 12.0 fL Final   • Platelets 04/16/2023 327  140 - 450 10*3/mm3 Final   • Lipase 04/16/2023 33  13 - 60 U/L Final   • Magnesium 04/16/2023 1.5 (L)  1.6 - 2.6 mg/dL Final   • HS Troponin T 04/16/2023 <6  <10 ng/L  Final   • TSH 04/16/2023 2.120  0.270 - 4.200 uIU/mL Final   • Free T4 04/16/2023 1.04  0.93 - 1.70 ng/dL Final   • D-Dimer, Quantitative 04/16/2023 0.40  0.00 - 0.50 MCGFEU/mL Final   • TSH 03/01/2023 0.998  0.270 - 4.200 uIU/mL Final      CTAP with contrast 2019  FINDINGS:  The lung bases are clear.  The liver, spleen, adrenal glands,  kidneys, pancreas and visualized portions of the bowel are  unremarkable.  There is a mildly prominent and heterogeneous  appearance in the region of the lower uterine segment in the  cervix.  No significant osseous abnormalities are identified.  IMPRESSION:  Appearance of the lower uterine segment and cervix is  nonspecific.  Follow-up with pelvic ultrasound is suggested for  better characterization.    Assessment / Plan      1. Chronic idiopathic constipation  2. Change in bowel habits  3. Generalized abdominal pain  4. Nausea  5. Syncope, unspecified syncope type  Approximately 7 years ago, she had a change in bowel habits from regular daily stools and intermittent diarrhea for now severe constipation.  She is only having a bowel movement every 10 to 14 days.  She has hard stools and straining.  Also with generalized abdominal pain and tenderness on exam.  She has associated nausea with bowel movements and has had more than 10 syncopal episodes associated with a bowel movement.  No prior EGD or colonoscopy.  No recent abdominal imaging.  Recent CBC showed decreased hemoglobin but admits menorrhagia, liver enzymes normal, TSH normal, CMP normal.  Has been told she has H. pylori based on serum testing in the past, no prior treatment.  Already following with cardiology and gynecology. Based on history, suspect IBS with constipation but other etiologies to rule out.    Labs today  H pylori breath test  CTAP without contrast (reported allergy)  Start Linzess 145 mcg once daily, samples given  EGD and colonoscopy will be arranged  Keep follow-ups with cardiology    - Tissue  Transglutaminase, IgA; Future  - IgA; Future  - H. Pylori Breath Test - , Lung; Future    - CT abdomen pelvis wo contrast; Future    - linaclotide (Linzess) 145 MCG capsule capsule; Take 1 capsule by mouth Daily. 30 minutes before meals on an empty stomach.  Dispense: 30 capsule; Refill: 5    She will have an esophagogastroduodenoscopy and colonoscopy performed with monitored anesthesia care. The indications, technique, alternatives and potential risk and complications were discussed with the patient including but not limited to bleeding, bowel perforations, missing lesions and anesthetic complications. The patient understands and wishes to proceed with the procedure and has given their verbal consent. Written patient education information was given to the patient. She should follow up in the office after this procedure to discuss the results and further recommendations can be made at that time. The patient will call if they have further questions before procedure.  - Case Request  - sodium-potassium-magnesium sulfates (SUPREP) 17.5-3.13-1.6 GM/177ML solution oral solution; Take 2 bottles by mouth 1 (One) Time for 1 dose. Please see prep instructions from office  Dispense: 354 mL; Refill: 0    6. Gastroesophageal reflux disease, unspecified whether esophagitis present  She notices worse reflux symptoms when her constipation is worse.  No current daily reflux medications are taken but she does take over-the-counter Prilosec as needed with good relief.  She was told she had H. pylori based on serum testing approximately treatment was done.  No prior EGD.  No current dysphagia.    Start Pepcid 40 mg up to twice daily for reflux  Do not take any PPIs for the next 1 week (last took 1 week ago to prepare for H. pylori breath test)  EGD will be arranged    - famotidine (PEPCID) 40 MG tablet; Take 1 tablet by mouth 2 (Two) Times a Day.  Dispense: 60 tablet; Refill: 3      Follow Up:   Return for follow up after procedure to  discuss results.    Emy Mcmanus PA-C  [facility] [place]  5/9/2023  16:32 EDT    Dictated Utilizing Dragon Dictation: Part of this note may be an electronic transcription/translation of spoken language to printed text using the Dragon Dictation System.

## 2023-05-11 PROBLEM — K21.9 GASTROESOPHAGEAL REFLUX DISEASE: Status: ACTIVE | Noted: 2023-05-11

## 2023-05-11 PROBLEM — R10.84 GENERALIZED ABDOMINAL PAIN: Status: ACTIVE | Noted: 2023-05-11

## 2023-05-11 PROBLEM — R11.0 NAUSEA: Status: ACTIVE | Noted: 2023-05-11

## 2023-05-11 PROBLEM — R19.4 CHANGE IN BOWEL HABITS: Status: ACTIVE | Noted: 2023-05-11

## 2023-05-24 ENCOUNTER — HOSPITAL ENCOUNTER (OUTPATIENT)
Dept: CT IMAGING | Facility: HOSPITAL | Age: 28
Discharge: HOME | End: 2023-05-24
Admitting: PHYSICIAN ASSISTANT
Payer: COMMERCIAL

## 2023-05-24 ENCOUNTER — PATIENT MESSAGE (OUTPATIENT)
Dept: GASTROENTEROLOGY | Facility: CLINIC | Age: 28
End: 2023-05-24
Payer: COMMERCIAL

## 2023-05-24 DIAGNOSIS — R11.0 NAUSEA: ICD-10-CM

## 2023-05-24 DIAGNOSIS — R11.0 NAUSEA: Primary | ICD-10-CM

## 2023-05-24 DIAGNOSIS — R19.4 CHANGE IN BOWEL HABITS: ICD-10-CM

## 2023-05-24 DIAGNOSIS — R10.84 GENERALIZED ABDOMINAL PAIN: ICD-10-CM

## 2023-05-24 PROCEDURE — 74176 CT ABD & PELVIS W/O CONTRAST: CPT

## 2023-05-25 RX ORDER — PROMETHAZINE HYDROCHLORIDE 12.5 MG/1
6.25 TABLET ORAL EVERY 6 HOURS PRN
Qty: 30 TABLET | Refills: 1 | Status: SHIPPED | OUTPATIENT
Start: 2023-05-25

## 2023-05-31 ENCOUNTER — OFFICE VISIT (OUTPATIENT)
Dept: INTERNAL MEDICINE | Facility: CLINIC | Age: 28
End: 2023-05-31
Payer: COMMERCIAL

## 2023-05-31 VITALS
HEIGHT: 68 IN | SYSTOLIC BLOOD PRESSURE: 106 MMHG | RESPIRATION RATE: 18 BRPM | DIASTOLIC BLOOD PRESSURE: 71 MMHG | TEMPERATURE: 97.5 F | BODY MASS INDEX: 29.1 KG/M2 | WEIGHT: 192 LBS | HEART RATE: 98 BPM | OXYGEN SATURATION: 98 %

## 2023-05-31 DIAGNOSIS — R70.0 ELEVATED SED RATE: ICD-10-CM

## 2023-05-31 DIAGNOSIS — R53.82 CHRONIC FATIGUE: ICD-10-CM

## 2023-05-31 DIAGNOSIS — R79.82 ELEVATED C-REACTIVE PROTEIN (CRP): ICD-10-CM

## 2023-05-31 DIAGNOSIS — M25.50 MULTIPLE JOINT PAIN: Primary | ICD-10-CM

## 2023-06-09 RX ORDER — SCOLOPAMINE TRANSDERMAL SYSTEM 1 MG/1
1 PATCH, EXTENDED RELEASE TRANSDERMAL
Qty: 10 EACH | Refills: 0 | Status: SHIPPED | OUTPATIENT
Start: 2023-06-09

## 2023-06-19 NOTE — PROGRESS NOTES
Chief Complaint / Reason:      Chief Complaint   Patient presents with   • Abstract     Discuss recent GI visit, referral the rheumatology        Subjective     HPI  Patient presents today with multiple complaints and states that she would like to have a referral to rheumatology and does have joint pain.  She states that she did see GI and they are concerned that she may have autoimmune disorder and she does have chronic fatigue.  Patient denies SI or HI.  Patient did have elevated sed rate and CRP.  History taken from: patient    PMH/FH/Social History were reviewed and updated appropriately in the electronic medical record.   Past Medical History:   Diagnosis Date   • Abnormal ECG    • Abnormal Pap smear of cervix    • ADHD (attention deficit hyperactivity disorder)    • Allergic    • Anemia    • Anxiety    • Cervical dysplasia    • Chest pain    • Chlamydia    • Chronic headaches 2016   • Collapse    • Depression    • Endometriosis    • Gastroesophageal reflux disease 2023   • Heart murmur    • Hematuria    • History of transfusion    • Hyperemesis gravidarum    • Migraine    • Ovarian cyst    • Pneumonia 2016   • Polycystic ovary syndrome    • Scoliosis    • Urinary tract infection      Past Surgical History:   Procedure Laterality Date   • ADENOIDECTOMY     •  SECTION     • CYSTOSCOPY     • CYSTOSCOPY     • MOUTH SURGERY      wisdom teeth   • TONSILLECTOMY     • WISDOM TOOTH EXTRACTION       Social History     Socioeconomic History   • Marital status:    Tobacco Use   • Smoking status: Never   • Smokeless tobacco: Never   Vaping Use   • Vaping Use: Never used   Substance and Sexual Activity   • Alcohol use: No   • Drug use: No   • Sexual activity: Yes     Partners: Male     Birth control/protection: None     Family History   Problem Relation Age of Onset   • Heart disease Mother    • Hypertension Mother    • Hyperlipidemia Mother    • Heart disease Father    • Hypertension  Father    • Hyperlipidemia Father    • Liver disease Father    • Heart attack Father    • Arthritis Maternal Grandmother    • Cancer Maternal Grandmother    • Kidney disease Maternal Grandmother    • Stroke Maternal Grandmother    • Cancer Paternal Grandmother    • Cancer Paternal Grandfather    • Stroke Paternal Grandfather    • Arthritis Paternal Grandfather    • Diabetes Paternal Grandfather    • Heart attack Paternal Grandfather        Review of Systems:   Review of Systems      All other systems were reviewed and are negative.  Exceptions are noted in the subjective or above.      Objective     Vital Signs  Vitals:    05/31/23 1705   BP: 106/71   Pulse: 98   Resp: 18   Temp: 97.5 °F (36.4 °C)   SpO2: 98%       Body mass index is 29.19 kg/m².    Physical Exam  Vitals and nursing note reviewed.   Constitutional:       General: She is not in acute distress.     Appearance: She is well-developed.   Cardiovascular:      Rate and Rhythm: Normal rate and regular rhythm.      Pulses: Normal pulses.      Heart sounds: Normal heart sounds.   Pulmonary:      Effort: Pulmonary effort is normal.      Breath sounds: Normal breath sounds. No wheezing.   Chest:      Chest wall: No tenderness.   Musculoskeletal:         General: Tenderness and deformity present. Normal range of motion.   Skin:     General: Skin is warm and dry.      Capillary Refill: Capillary refill takes less than 2 seconds.      Coloration: Skin is not pale.      Findings: No erythema or rash.   Neurological:      Mental Status: She is alert and oriented to person, place, and time.   Psychiatric:         Behavior: Behavior normal.         Thought Content: Thought content normal.         Judgment: Judgment normal.              Results Review:    I reviewed the patient's new clinical results.       Medication Review:     Current Outpatient Medications:   •  busPIRone (BUSPAR) 10 MG tablet, TAKE ONE TABLET BY MOUTH THREE TIMES A DAY, Disp: 90 tablet, Rfl: 1  •   famotidine (PEPCID) 40 MG tablet, Take 1 tablet by mouth 2 (Two) Times a Day., Disp: 60 tablet, Rfl: 3  •  linaclotide (Linzess) 72 MCG capsule capsule, Take 1 capsule by mouth Daily. 30 minutes before meals on an empty stomach., Disp: 30 capsule, Rfl: 5  •  metFORMIN (Glucophage) 500 MG tablet, One po qday x 2 weeks then BID thereafter, Disp: 30 tablet, Rfl: 11  •  midodrine (PROAMATINE) 2.5 MG tablet, Take 1 tablet by mouth 2 (Two) Times a Day., Disp: 60 tablet, Rfl: 3  •  promethazine (PHENERGAN) 12.5 MG tablet, Take 0.5 tablets by mouth Every 6 (Six) Hours As Needed for Nausea or Vomiting., Disp: 30 tablet, Rfl: 1  •  propranolol (INDERAL) 10 MG tablet, Take 1 tablet by mouth 2 (Two) Times a Day., Disp: 60 tablet, Rfl: 3  •  venlafaxine XR (EFFEXOR-XR) 150 MG 24 hr capsule, Take 1 capsule by mouth Daily., Disp: 90 capsule, Rfl: 3  •  indomethacin (INDOCIN) 25 MG capsule, Take 1 capsule by mouth 2 (Two) Times a Day With Meals. (Patient not taking: Reported on 5/31/2023), Disp: 20 capsule, Rfl: 0  •  medroxyPROGESTERone (Provera) 10 MG tablet, One po qday x 10 days if no menses by day 35 (Patient not taking: Reported on 5/31/2023), Disp: 10 tablet, Rfl: 6  •  Scopolamine 1 MG/3DAYS patch, Place 1 patch on the skin as directed by provider Every 72 (Seventy-Two) Hours., Disp: 10 each, Rfl: 0    Diagnoses and all orders for this visit:    Multiple joint pain  -     MARIA FERNANDA by IFA, Reflex 9-biomarkers profile  -     Sedimentation Rate  -     C-reactive protein  -     Rheumatoid Factor    Chronic fatigue  -     MARIA FERNANDA by IFA, Reflex 9-biomarkers profile  -     Thyroid Peroxidase Antibody    Elevated sed rate  -     Sedimentation Rate  -     C-reactive protein  -     Rheumatoid Factor    Elevated C-reactive protein (CRP)  -     MARIA FERNANDA by IFA, Reflex 9-biomarkers profile  -     Sedimentation Rate  -     C-reactive protein          No follow-ups on file.    Negin Broussard, APRN  05/31/2023

## 2023-06-23 LAB
CHROMATIN AB SERPL-ACNC: <10 IU/ML (ref 0–14)
CRP SERPL-MCNC: 0.45 MG/DL (ref 0–0.5)
ERYTHROCYTE [SEDIMENTATION RATE] IN BLOOD: 19 MM/HR (ref 0–20)

## 2023-06-25 LAB — THYROPEROXIDASE AB SERPL-ACNC: <9 IU/ML (ref 0–34)

## 2023-06-27 LAB
ANA SER QL IF: NEGATIVE
LABORATORY COMMENT REPORT: NORMAL

## 2023-07-24 ENCOUNTER — PATIENT MESSAGE (OUTPATIENT)
Dept: GASTROENTEROLOGY | Facility: CLINIC | Age: 28
End: 2023-07-24
Payer: COMMERCIAL

## 2023-08-15 ENCOUNTER — APPOINTMENT (OUTPATIENT)
Dept: GENERAL RADIOLOGY | Facility: HOSPITAL | Age: 28
End: 2023-08-15
Payer: COMMERCIAL

## 2023-08-15 ENCOUNTER — HOSPITAL ENCOUNTER (EMERGENCY)
Facility: HOSPITAL | Age: 28
Discharge: HOME | End: 2023-08-15
Attending: EMERGENCY MEDICINE
Payer: COMMERCIAL

## 2023-08-15 VITALS
SYSTOLIC BLOOD PRESSURE: 96 MMHG | BODY MASS INDEX: 28.79 KG/M2 | OXYGEN SATURATION: 98 % | HEIGHT: 68 IN | DIASTOLIC BLOOD PRESSURE: 64 MMHG | WEIGHT: 190 LBS | TEMPERATURE: 97.9 F | HEART RATE: 83 BPM | RESPIRATION RATE: 18 BRPM
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
## 2023-08-15 DIAGNOSIS — J68.9 INHALATION INJURY DUE TO CHEMICAL: ICD-10-CM

## 2023-08-15 PROCEDURE — 71045 X-RAY EXAM CHEST 1 VIEW: CPT

## 2023-08-15 PROCEDURE — 94664 DEMO&/EVAL PT USE INHALER: CPT

## 2023-08-15 PROCEDURE — 94799 UNLISTED PULMONARY SVC/PX: CPT

## 2023-08-15 PROCEDURE — 94640 AIRWAY INHALATION TREATMENT: CPT

## 2023-08-15 PROCEDURE — 94761 N-INVAS EAR/PLS OXIMETRY MLT: CPT

## 2023-08-15 PROCEDURE — 99283 EMERGENCY DEPT VISIT LOW MDM: CPT

## 2023-08-15 RX ORDER — IPRATROPIUM BROMIDE AND ALBUTEROL SULFATE 2.5; .5 MG/3ML; MG/3ML
3 SOLUTION RESPIRATORY (INHALATION) ONCE
Status: COMPLETED | OUTPATIENT
Start: 2023-08-15 | End: 2023-08-15

## 2023-08-15 RX ORDER — IBUPROFEN 800 MG/1
800 TABLET ORAL ONCE
Status: COMPLETED | OUTPATIENT
Start: 2023-08-15 | End: 2023-08-15

## 2023-08-15 RX ORDER — CYCLOBENZAPRINE HCL 5 MG
5 TABLET ORAL 3 TIMES DAILY PRN
Qty: 12 TABLET | Refills: 0 | Status: SHIPPED | OUTPATIENT
Start: 2023-08-15

## 2023-08-15 RX ORDER — CYCLOBENZAPRINE HCL 10 MG
10 TABLET ORAL ONCE
Status: COMPLETED | OUTPATIENT
Start: 2023-08-15 | End: 2023-08-15

## 2023-08-15 RX ORDER — IBUPROFEN 600 MG/1
600 TABLET ORAL EVERY 6 HOURS PRN
Qty: 30 TABLET | Refills: 0 | Status: SHIPPED | OUTPATIENT
Start: 2023-08-15

## 2023-08-15 RX ORDER — ALBUTEROL SULFATE 90 UG/1
2 AEROSOL, METERED RESPIRATORY (INHALATION)
Qty: 18 G | Refills: 0 | Status: SHIPPED | OUTPATIENT
Start: 2023-08-15

## 2023-08-15 RX ADMIN — IBUPROFEN 800 MG: 800 TABLET, FILM COATED ORAL at 08:57

## 2023-08-15 RX ADMIN — IPRATROPIUM BROMIDE AND ALBUTEROL SULFATE 3 ML: .5; 3 SOLUTION RESPIRATORY (INHALATION) at 08:51

## 2023-08-15 RX ADMIN — CYCLOBENZAPRINE 10 MG: 10 TABLET, FILM COATED ORAL at 08:57
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
## 2023-11-01 ENCOUNTER — APPOINTMENT (OUTPATIENT)
Dept: CT IMAGING | Facility: HOSPITAL | Age: 28
End: 2023-11-01
Payer: COMMERCIAL

## 2023-11-01 ENCOUNTER — HOSPITAL ENCOUNTER (EMERGENCY)
Facility: HOSPITAL | Age: 28
Discharge: HOME | End: 2023-11-01
Attending: EMERGENCY MEDICINE | Admitting: EMERGENCY MEDICINE
Payer: COMMERCIAL

## 2023-11-01 VITALS
WEIGHT: 185 LBS | HEART RATE: 100 BPM | TEMPERATURE: 97.8 F | BODY MASS INDEX: 28.04 KG/M2 | DIASTOLIC BLOOD PRESSURE: 64 MMHG | SYSTOLIC BLOOD PRESSURE: 108 MMHG | RESPIRATION RATE: 17 BRPM | OXYGEN SATURATION: 99 % | HEIGHT: 68 IN

## 2023-11-01 DIAGNOSIS — R55 SYNCOPE, UNSPECIFIED SYNCOPE TYPE: ICD-10-CM

## 2023-11-01 DIAGNOSIS — R11.2 NAUSEA VOMITING AND DIARRHEA: ICD-10-CM

## 2023-11-01 DIAGNOSIS — R19.7 NAUSEA VOMITING AND DIARRHEA: ICD-10-CM

## 2023-11-01 DIAGNOSIS — R10.13 EPIGASTRIC PAIN: Primary | ICD-10-CM

## 2023-11-01 LAB
ALBUMIN SERPL-MCNC: 4.7 G/DL (ref 3.5–5.2)
ALBUMIN/GLOB SERPL: 1.6 G/DL
ALP SERPL-CCNC: 97 U/L (ref 39–117)
ALT SERPL W P-5'-P-CCNC: 11 U/L (ref 1–33)
ANION GAP SERPL CALCULATED.3IONS-SCNC: 10.5 MMOL/L (ref 5–15)
AST SERPL-CCNC: 16 U/L (ref 1–32)
BACTERIA UR QL AUTO: ABNORMAL /HPF
BASOPHILS # BLD AUTO: 0 10*3/MM3 (ref 0–0.2)
BASOPHILS NFR BLD AUTO: 0 % (ref 0–1.5)
BILIRUB SERPL-MCNC: 0.3 MG/DL (ref 0–1.2)
BILIRUB UR QL STRIP: NEGATIVE
BUN SERPL-MCNC: 13 MG/DL (ref 6–20)
BUN/CREAT SERPL: 17.3 (ref 7–25)
CALCIUM SPEC-SCNC: 9.5 MG/DL (ref 8.6–10.5)
CHLORIDE SERPL-SCNC: 104 MMOL/L (ref 98–107)
CLARITY UR: CLEAR
CO2 SERPL-SCNC: 22.5 MMOL/L (ref 22–29)
COLOR UR: YELLOW
CREAT SERPL-MCNC: 0.75 MG/DL (ref 0.57–1)
DEPRECATED RDW RBC AUTO: 39.1 FL (ref 37–54)
EGFRCR SERPLBLD CKD-EPI 2021: 111.4 ML/MIN/1.73
EOSINOPHIL # BLD AUTO: 0 10*3/MM3 (ref 0–0.4)
EOSINOPHIL NFR BLD AUTO: 0 % (ref 0.3–6.2)
ERYTHROCYTE [DISTWIDTH] IN BLOOD BY AUTOMATED COUNT: 13.7 % (ref 12.3–15.4)
FLUAV RNA RESP QL NAA+PROBE: NOT DETECTED
FLUBV RNA RESP QL NAA+PROBE: NOT DETECTED
GLOBULIN UR ELPH-MCNC: 3 GM/DL
GLUCOSE BLDC GLUCOMTR-MCNC: 102 MG/DL (ref 70–130)
GLUCOSE SERPL-MCNC: 134 MG/DL (ref 65–99)
GLUCOSE UR STRIP-MCNC: NEGATIVE MG/DL
HCT VFR BLD AUTO: 37.8 % (ref 34–46.6)
HGB BLD-MCNC: 12 G/DL (ref 12–15.9)
HGB UR QL STRIP.AUTO: ABNORMAL
HOLD SPECIMEN: NORMAL
HOLD SPECIMEN: NORMAL
HYALINE CASTS UR QL AUTO: ABNORMAL /LPF
IMM GRANULOCYTES # BLD AUTO: 0.03 10*3/MM3 (ref 0–0.05)
IMM GRANULOCYTES NFR BLD AUTO: 0.3 % (ref 0–0.5)
KETONES UR QL STRIP: NEGATIVE
LEUKOCYTE ESTERASE UR QL STRIP.AUTO: NEGATIVE
LIPASE SERPL-CCNC: 25 U/L (ref 13–60)
LYMPHOCYTES # BLD AUTO: 0.43 10*3/MM3 (ref 0.7–3.1)
LYMPHOCYTES NFR BLD AUTO: 4.1 % (ref 19.6–45.3)
MAGNESIUM SERPL-MCNC: 1.9 MG/DL (ref 1.6–2.6)
MCH RBC QN AUTO: 25.1 PG (ref 26.6–33)
MCHC RBC AUTO-ENTMCNC: 31.7 G/DL (ref 31.5–35.7)
MCV RBC AUTO: 79.1 FL (ref 79–97)
MONOCYTES # BLD AUTO: 0.39 10*3/MM3 (ref 0.1–0.9)
MONOCYTES NFR BLD AUTO: 3.7 % (ref 5–12)
MUCOUS THREADS URNS QL MICRO: ABNORMAL /HPF
NEUTROPHILS NFR BLD AUTO: 9.65 10*3/MM3 (ref 1.7–7)
NEUTROPHILS NFR BLD AUTO: 91.9 % (ref 42.7–76)
NITRITE UR QL STRIP: NEGATIVE
NRBC BLD AUTO-RTO: 0 /100 WBC (ref 0–0.2)
PH UR STRIP.AUTO: 7 [PH] (ref 5–8)
PLATELET # BLD AUTO: 277 10*3/MM3 (ref 140–450)
PMV BLD AUTO: 9.3 FL (ref 6–12)
POTASSIUM SERPL-SCNC: 3.8 MMOL/L (ref 3.5–5.2)
PROT SERPL-MCNC: 7.7 G/DL (ref 6–8.5)
PROT UR QL STRIP: NEGATIVE
RBC # BLD AUTO: 4.78 10*6/MM3 (ref 3.77–5.28)
RBC # UR STRIP: ABNORMAL /HPF
REF LAB TEST METHOD: ABNORMAL
SARS-COV-2 RNA RESP QL NAA+PROBE: NOT DETECTED
SODIUM SERPL-SCNC: 137 MMOL/L (ref 136–145)
SP GR UR STRIP: 1.02 (ref 1–1.03)
SQUAMOUS #/AREA URNS HPF: ABNORMAL /HPF
TROPONIN T SERPL HS-MCNC: <6 NG/L
UROBILINOGEN UR QL STRIP: ABNORMAL
WBC # UR STRIP: ABNORMAL /HPF
WBC NRBC COR # BLD: 10.5 10*3/MM3 (ref 3.4–10.8)
WHOLE BLOOD HOLD COAG: NORMAL
WHOLE BLOOD HOLD SPECIMEN: NORMAL

## 2023-11-01 PROCEDURE — 83690 ASSAY OF LIPASE: CPT | Performed by: EMERGENCY MEDICINE

## 2023-11-01 PROCEDURE — 25010000002 METOCLOPRAMIDE PER 10 MG: Performed by: PHYSICIAN ASSISTANT

## 2023-11-01 PROCEDURE — 99284 EMERGENCY DEPT VISIT MOD MDM: CPT

## 2023-11-01 PROCEDURE — 81001 URINALYSIS AUTO W/SCOPE: CPT | Performed by: EMERGENCY MEDICINE

## 2023-11-01 PROCEDURE — 85025 COMPLETE CBC W/AUTO DIFF WBC: CPT | Performed by: EMERGENCY MEDICINE

## 2023-11-01 PROCEDURE — 84484 ASSAY OF TROPONIN QUANT: CPT | Performed by: EMERGENCY MEDICINE

## 2023-11-01 PROCEDURE — 93005 ELECTROCARDIOGRAM TRACING: CPT | Performed by: EMERGENCY MEDICINE

## 2023-11-01 PROCEDURE — 25010000002 DIPHENHYDRAMINE PER 50 MG: Performed by: PHYSICIAN ASSISTANT

## 2023-11-01 PROCEDURE — 83735 ASSAY OF MAGNESIUM: CPT | Performed by: EMERGENCY MEDICINE

## 2023-11-01 PROCEDURE — 82948 REAGENT STRIP/BLOOD GLUCOSE: CPT

## 2023-11-01 PROCEDURE — 74176 CT ABD & PELVIS W/O CONTRAST: CPT

## 2023-11-01 PROCEDURE — 87636 SARSCOV2 & INF A&B AMP PRB: CPT | Performed by: PHYSICIAN ASSISTANT

## 2023-11-01 PROCEDURE — 96374 THER/PROPH/DIAG INJ IV PUSH: CPT

## 2023-11-01 PROCEDURE — 96375 TX/PRO/DX INJ NEW DRUG ADDON: CPT

## 2023-11-01 PROCEDURE — 80053 COMPREHEN METABOLIC PANEL: CPT | Performed by: EMERGENCY MEDICINE

## 2023-11-01 PROCEDURE — 25810000003 SODIUM CHLORIDE 0.9 % SOLUTION: Performed by: PHYSICIAN ASSISTANT

## 2023-11-01 RX ORDER — METOCLOPRAMIDE HYDROCHLORIDE 5 MG/ML
10 INJECTION INTRAMUSCULAR; INTRAVENOUS ONCE
Status: COMPLETED | OUTPATIENT
Start: 2023-11-01 | End: 2023-11-01

## 2023-11-01 RX ORDER — FAMOTIDINE 10 MG/ML
20 INJECTION, SOLUTION INTRAVENOUS ONCE
Status: COMPLETED | OUTPATIENT
Start: 2023-11-01 | End: 2023-11-01

## 2023-11-01 RX ORDER — DICYCLOMINE HYDROCHLORIDE 10 MG/1
20 CAPSULE ORAL ONCE
Status: COMPLETED | OUTPATIENT
Start: 2023-11-01 | End: 2023-11-01

## 2023-11-01 RX ORDER — DIPHENHYDRAMINE HYDROCHLORIDE 50 MG/ML
25 INJECTION INTRAMUSCULAR; INTRAVENOUS ONCE
Status: COMPLETED | OUTPATIENT
Start: 2023-11-01 | End: 2023-11-01

## 2023-11-01 RX ORDER — SODIUM CHLORIDE 0.9 % (FLUSH) 0.9 %
10 SYRINGE (ML) INJECTION AS NEEDED
Status: DISCONTINUED | OUTPATIENT
Start: 2023-11-01 | End: 2023-11-01 | Stop reason: HOSPADM

## 2023-11-01 RX ADMIN — FAMOTIDINE 20 MG: 10 INJECTION INTRAVENOUS at 14:14

## 2023-11-01 RX ADMIN — DIPHENHYDRAMINE HYDROCHLORIDE 25 MG: 50 INJECTION INTRAMUSCULAR; INTRAVENOUS at 14:14

## 2023-11-01 RX ADMIN — SODIUM CHLORIDE 1000 ML: 9 INJECTION, SOLUTION INTRAVENOUS at 14:13

## 2023-11-01 RX ADMIN — METOCLOPRAMIDE HYDROCHLORIDE 10 MG: 5 INJECTION INTRAMUSCULAR; INTRAVENOUS at 14:14

## 2023-11-01 RX ADMIN — DICYCLOMINE HYDROCHLORIDE 20 MG: 10 CAPSULE ORAL at 14:13

## 2023-11-01 NOTE — DISCHARGE INSTRUCTIONS
Drink plenty of water to rehydrate.  Avoid spicy foods, alcohol, NSAIDs, acidic foods, caffeine, chocolate, peppermint.  Eat small meals more frequently.  Follow-up with your PCP for further outpatient evaluation if symptoms persist.  Follow-up with Dr. Gan, gastroenterologist, for further outpatient evaluation if symptoms persist.  Return to the ER for new or worsening symptoms or acute concerns.

## 2023-11-01 NOTE — ED PROVIDER NOTES
Subjective:  Chief Complaint:  Abdominal pain    History of Present Illness:  Patient is a 28-year-old female with history of anemia, anxiety, chlamydia, migraines, among others presenting to the ER with complaints of nausea, vomiting, diarrhea, syncopal episodes which she states there have been for up today as well as epigastric abdominal pain.  Patient states that the reason she is here is the abdominal pain.  She states that syncopal episodes are common for her due to POTS.  She states that she woke up around 6 AM and felt like she had a POTS episode coming on and vomited and then passed out.  She states she woke up and had had an episode of incontinence.  She states that she normally takes Zofran and Phenergan and symptoms will get better but she states she has had epigastric abdominal pain since then and it has not gotten better.  She denies blood in stool.  Denies additional symptoms or complaints at this time.      Nurses Notes reviewed and agree, including vitals, allergies, social history and prior medical history.     REVIEW OF SYSTEMS: All systems reviewed and not pertinent unless noted.  Review of Systems   Gastrointestinal:  Positive for abdominal pain, diarrhea, nausea and vomiting.   Neurological:  Positive for syncope.   All other systems reviewed and are negative.      Past Medical History:   Diagnosis Date    Abnormal ECG     Abnormal Pap smear of cervix     ADHD (attention deficit hyperactivity disorder)     Allergic     Anemia     Anxiety     Cervical dysplasia     Chest pain     Chlamydia     Chronic headaches 09/13/2016    Depression     Endometriosis     Gastroesophageal reflux disease 05/11/2023    Heart murmur     Hematuria     History of transfusion     Hyperemesis gravidarum     Migraine     Ovarian cyst     Pneumonia 09/13/2016    Polycystic ovary syndrome     Scoliosis     Spinal headache     Urinary tract infection     Vasovagal syncope        Allergies:    Iodine, Shellfish allergy,  "and Penicillins      Past Surgical History:   Procedure Laterality Date    ADENOIDECTOMY       SECTION      COLONOSCOPY N/A 2023    Procedure: COLONOSCOPY WITH BIOPSIES;  Surgeon: Mickie Orellana MD;  [facility];  Service: Gastroenterology;  Laterality: N/A;    CYSTOSCOPY      urethral stenosis    ENDOSCOPY N/A 2023    Procedure: ESOPHAGOGASTRODUODENOSCOPY WITH BIOPSIES AND POLYPECTOMY;  Surgeon: Mickie Orellana MD;  [facility];  Service: Gastroenterology;  Laterality: N/A;    TONSILLECTOMY      WISDOM TOOTH EXTRACTION           Social History     Socioeconomic History    Marital status:    Tobacco Use    Smoking status: Never    Smokeless tobacco: Never   Vaping Use    Vaping Use: Never used   Substance and Sexual Activity    Alcohol use: No    Drug use: No    Sexual activity: Yes     Partners: Male     Birth control/protection: None         Family History   Problem Relation Age of Onset    Heart disease Mother     Hypertension Mother     Hyperlipidemia Mother     Heart disease Father     Hypertension Father     Hyperlipidemia Father     Liver disease Father     Heart attack Father     Arthritis Maternal Grandmother     Cancer Maternal Grandmother     Kidney disease Maternal Grandmother     Stroke Maternal Grandmother     Cancer Paternal Grandmother     Cancer Paternal Grandfather     Stroke Paternal Grandfather     Arthritis Paternal Grandfather     Diabetes Paternal Grandfather     Heart attack Paternal Grandfather        Objective  Physical Exam:  /71 (Patient Position: Standing)   Pulse (!) 124   Temp 97.8 °F (36.6 °C) (Oral)   Resp 18   Ht 172.7 cm (68\")   Wt 83.9 kg (185 lb)   LMP 10/27/2023 (Exact Date)   SpO2 96%   BMI 28.13 kg/m²      Physical Exam  Vitals and nursing note reviewed.   Constitutional:       General: She is not in acute distress.     Appearance: She is not toxic-appearing.   HENT:      Head: Normocephalic and " atraumatic.   Eyes:      Extraocular Movements: Extraocular movements intact.   Cardiovascular:      Rate and Rhythm: Tachycardia present.      Heart sounds: Normal heart sounds.   Pulmonary:      Effort: Pulmonary effort is normal. No respiratory distress.      Breath sounds: Normal breath sounds.   Abdominal:      General: There is no distension.      Palpations: Abdomen is soft.      Tenderness: There is abdominal tenderness in the epigastric area. There is no guarding or rebound.   Skin:     General: Skin is warm and dry.   Neurological:      General: No focal deficit present.      Mental Status: She is alert and oriented to person, place, and time.   Psychiatric:         Mood and Affect: Mood normal.         Behavior: Behavior normal.         Procedures    ED Course:    ED Course as of 11/01/23 1551   Wed Nov 01, 2023   1318 EKG interpreted by me: Sinus tachycardia, no acute ST/T changes, this is an abnormal EKG [MP]      ED Course User Index  [MP] Bentley Wilkins MD       Lab Results (last 24 hours)       Procedure Component Value Units Date/Time    CBC & Differential [[id number]]  (Abnormal) Collected: 11/01/23 1303    Specimen: Blood Updated: 11/01/23 1311    Narrative:      The following orders were created for panel order CBC & Differential.  Procedure                               Abnormality         Status                     ---------                               -----------         ------                     CBC Auto Differential[[id number]]        Abnormal            Final result                 Please view results for these tests on the individual orders.    Comprehensive Metabolic Panel [[id number]]  (Abnormal) Collected: 11/01/23 1303    Specimen: Blood Updated: 11/01/23 1337     Glucose 134 mg/dL      BUN 13 mg/dL      Creatinine 0.75 mg/dL      Sodium 137 mmol/L      Potassium 3.8 mmol/L      Chloride 104 mmol/L      CO2 22.5 mmol/L      Calcium 9.5 mg/dL      Total Protein 7.7 g/dL       Albumin 4.7 g/dL      ALT (SGPT) 11 U/L      AST (SGOT) 16 U/L      Alkaline Phosphatase 97 U/L      Total Bilirubin 0.3 mg/dL      Globulin 3.0 gm/dL      A/G Ratio 1.6 g/dL      BUN/Creatinine Ratio 17.3     Anion Gap 10.5 mmol/L      eGFR 111.4 mL/min/1.73     Narrative:      GFR Normal >60  Chronic Kidney Disease <60  Kidney Failure <15      Magnesium [[id number]]  (Normal) Collected: 11/01/23 1303    Specimen: Blood Updated: 11/01/23 1337     Magnesium 1.9 mg/dL     Single High Sensitivity Troponin T [[id number]]  (Normal) Collected: 11/01/23 1303    Specimen: Blood Updated: 11/01/23 1339     HS Troponin T <6 ng/L     Narrative:      High Sensitive Troponin T Reference Range:  <10.0 ng/L- Negative Female for AMI  <15.0 ng/L- Negative Male for AMI  >=10 - Abnormal Female indicating possible myocardial injury.  >=15 - Abnormal Male indicating possible myocardial injury.   Clinicians would have to utilize clinical acumen, EKG, Troponin, and serial changes to determine if it is an Acute Myocardial Infarction or myocardial injury due to an underlying chronic condition.         CBC Auto Differential [[id number]]  (Abnormal) Collected: 11/01/23 1303    Specimen: Blood Updated: 11/01/23 1311     WBC 10.50 10*3/mm3      RBC 4.78 10*6/mm3      Hemoglobin 12.0 g/dL      Hematocrit 37.8 %      MCV 79.1 fL      MCH 25.1 pg      MCHC 31.7 g/dL      RDW 13.7 %      RDW-SD 39.1 fl      MPV 9.3 fL      Platelets 277 10*3/mm3      Neutrophil % 91.9 %      Lymphocyte % 4.1 %      Monocyte % 3.7 %      Eosinophil % 0.0 %      Basophil % 0.0 %      Immature Grans % 0.3 %      Neutrophils, Absolute 9.65 10*3/mm3      Lymphocytes, Absolute 0.43 10*3/mm3      Monocytes, Absolute 0.39 10*3/mm3      Eosinophils, Absolute 0.00 10*3/mm3      Basophils, Absolute 0.00 10*3/mm3      Immature Grans, Absolute 0.03 10*3/mm3      nRBC 0.0 /100 WBC     Lipase [[id number]]  (Normal) Collected: 11/01/23 1303    Specimen: Blood Updated: 11/01/23 4654      Lipase 25 U/L     COVID-19 and FLU A/B PCR - Swab, Nasopharynx [[id number]]  (Normal) Collected: 11/01/23 1330    Specimen: Swab from Nasopharynx Updated: 11/01/23 1356     COVID19 Not Detected     Influenza A PCR Not Detected     Influenza B PCR Not Detected    Narrative:      Fact sheet for providers: https://www.fda.gov/media/418149/download    Fact sheet for patients: https://www.fda.gov/media/662253/download    Test performed by PCR.    Urinalysis With Culture If Indicated - Urine, Clean Catch [[id number]]  (Abnormal) Collected: 11/01/23 1330    Specimen: Urine, Clean Catch Updated: 11/01/23 1342     Color, UA Yellow     Appearance, UA Clear     pH, UA 7.0     Specific Gravity, UA 1.023     Glucose, UA Negative     Ketones, UA Negative     Bilirubin, UA Negative     Blood, UA Moderate (2+)     Protein, UA Negative     Leuk Esterase, UA Negative     Nitrite, UA Negative     Urobilinogen, UA 0.2 E.U./dL    Narrative:      In absence of clinical symptoms, the presence of pyuria, bacteria, and/or nitrites on the urinalysis result does not correlate with infection.    Urinalysis, Microscopic Only - Urine, Clean Catch [[id number]]  (Abnormal) Collected: 11/01/23 1330    Specimen: Urine, Clean Catch Updated: 11/01/23 1352     RBC, UA 0-2 /HPF      WBC, UA 3-5 /HPF      Comment: Urine culture not indicated.        Bacteria, UA Trace /HPF      Squamous Epithelial Cells, UA None Seen /HPF      Hyaline Casts, UA None Seen /LPF      Mucus, UA Small/1+ /HPF      Methodology Manual Light Microscopy    POC Glucose Once [[id number]]  (Normal) Collected: 11/01/23 1348    Specimen: Blood Updated: 11/01/23 1411     Glucose 102 mg/dL      Comment: Serial Number: [id number]:  [id number]                CT Abdomen Pelvis Without Contrast    Result Date: 11/1/2023  PROCEDURE: CT ABDOMEN PELVIS WO CONTRAST-  HISTORY: epigastric and upper abdominal pain, n/v  COMPARISON: May 2023.  PROCEDURE: Axial images were obtained from the lung  bases through the pubic symphysis without intravenous contrast.  FINDINGS:  ABDOMEN: The lung bases are clear. The heart size is normal. Solid abdominal organs are unremarkable. The gallbladder is unremarkable. The aorta is normal in caliber. There is no significant free fluid, free air, or adenopathy.  There is no nephrolithiasis. There is no hydronephrosis. Limited noncontrast images of the bowel are unremarkable.  PELVIS: The appendix is is normal. Uterus is retroverted. A dominant cyst in the left ovary measures 26 mm, similar in size to a left ovarian lesion seen on the prior exam. The urinary bladder is unremarkable. There is no significant fluid or adenopathy.      Impression: No acute findings.     CTDI: 7.16 mGy DLP: 350.76 mGy.cm   This study was performed with techniques to keep radiation doses as low as reasonably achievable (ALARA). Individualized dose reduction techniques using automated exposure control or adjustment of mA and/or kV according to the patient size were employed.     Images were reviewed, interpreted, and dictated by Dr. Thor Zacarias MD Transcribed by Lalita Patel PA-C.  This report was signed and finalized on 11/1/2023 3:38 PM by Thor Zacarias MD.          MDM  Patient was evaluated in the ER for epigastric abdominal pain as well as nausea, vomiting, diarrhea, syncopal episodes.  Patient reports syncopal episodes are chronic problem for her secondary to POTS.  She is concerned about the abdominal pain which she states is why she came to the ER.  She is hemodynamically stable, afebrile, nontoxic-appearing on exam.  Differential diagnosis includes but is not limited to gastritis, GERD, peptic ulcer disease, gallbladder disease, cardiac arrhythmia, among others.  Initial plan includes CBC, CMP, lipase, magnesium, troponin, urinalysis, COVID/flu swab, EKG, CT abdomen and pelvis without contrast, and treatment with IV fluids, Reglan, Benadryl, Pepcid, and Bentyl.    COVID and flu  negative.  Labs nonactionable.  CT unremarkable per radiology.  Patient states that she is still having some pain but nausea and vomiting have improved.  She is agreeable with plan for discharge.  She has establish care with gastroenterology.  She was advised to follow-up with them for further outpatient evaluation if symptoms persist.  Also advised follow-up with PCP as needed.  Recommended avoiding caffeine, alcohol, NSAIDs, spicy and acidic foods, peppermint and chocolate.  Advised smaller meals more frequently and drinking lots of water.  Precautions were given for return to the ER for any new or worsening symptoms.    Final diagnoses:   Epigastric pain   Syncope, unspecified syncope type   Nausea vomiting and diarrhea          Qi Romo, NELLIE  11/01/23 9167

## 2023-11-13 ENCOUNTER — HOSPITAL ENCOUNTER (EMERGENCY)
Facility: HOSPITAL | Age: 28
Discharge: HOME | End: 2023-11-14
Attending: STUDENT IN AN ORGANIZED HEALTH CARE EDUCATION/TRAINING PROGRAM | Admitting: STUDENT IN AN ORGANIZED HEALTH CARE EDUCATION/TRAINING PROGRAM
Payer: COMMERCIAL

## 2023-11-13 DIAGNOSIS — R07.9 CHEST PAIN, UNSPECIFIED TYPE: Primary | ICD-10-CM

## 2023-11-13 PROCEDURE — 99284 EMERGENCY DEPT VISIT MOD MDM: CPT

## 2023-11-13 PROCEDURE — 36415 COLL VENOUS BLD VENIPUNCTURE: CPT

## 2023-11-13 NOTE — Clinical Note
[facility] EMERGENCY DEPARTMENT  [address] [address]  Phone: [phone]    Elen Cristofer Oates was seen and treated in our emergency department on 11/13/2023.  She may return to work on 11/17/2023.         Thank you for choosing [facility].    Tom Holliday MD

## 2023-11-14 ENCOUNTER — APPOINTMENT (OUTPATIENT)
Dept: GENERAL RADIOLOGY | Facility: HOSPITAL | Age: 28
End: 2023-11-14
Payer: COMMERCIAL

## 2023-11-14 VITALS
BODY MASS INDEX: 26.52 KG/M2 | HEIGHT: 68 IN | DIASTOLIC BLOOD PRESSURE: 80 MMHG | WEIGHT: 175 LBS | SYSTOLIC BLOOD PRESSURE: 127 MMHG | OXYGEN SATURATION: 99 % | RESPIRATION RATE: 18 BRPM | TEMPERATURE: 97.9 F | HEART RATE: 84 BPM

## 2023-11-14 LAB
ALBUMIN SERPL-MCNC: 4.9 G/DL (ref 3.5–5.2)
ALBUMIN/GLOB SERPL: 1.6 G/DL
ALP SERPL-CCNC: 93 U/L (ref 39–117)
ALT SERPL W P-5'-P-CCNC: 18 U/L (ref 1–33)
ANION GAP SERPL CALCULATED.3IONS-SCNC: 10.3 MMOL/L (ref 5–15)
AST SERPL-CCNC: 20 U/L (ref 1–32)
BASOPHILS # BLD AUTO: 0.01 10*3/MM3 (ref 0–0.2)
BASOPHILS NFR BLD AUTO: 0.1 % (ref 0–1.5)
BILIRUB SERPL-MCNC: 0.2 MG/DL (ref 0–1.2)
BUN SERPL-MCNC: 9 MG/DL (ref 6–20)
BUN/CREAT SERPL: 11.1 (ref 7–25)
CALCIUM SPEC-SCNC: 10 MG/DL (ref 8.6–10.5)
CHLORIDE SERPL-SCNC: 100 MMOL/L (ref 98–107)
CO2 SERPL-SCNC: 28.7 MMOL/L (ref 22–29)
CREAT SERPL-MCNC: 0.81 MG/DL (ref 0.57–1)
DEPRECATED RDW RBC AUTO: 40 FL (ref 37–54)
EGFRCR SERPLBLD CKD-EPI 2021: 101.5 ML/MIN/1.73
EOSINOPHIL # BLD AUTO: 0 10*3/MM3 (ref 0–0.4)
EOSINOPHIL NFR BLD AUTO: 0 % (ref 0.3–6.2)
ERYTHROCYTE [DISTWIDTH] IN BLOOD BY AUTOMATED COUNT: 13.5 % (ref 12.3–15.4)
GLOBULIN UR ELPH-MCNC: 3.1 GM/DL
GLUCOSE SERPL-MCNC: 115 MG/DL (ref 65–99)
HCT VFR BLD AUTO: 37.6 % (ref 34–46.6)
HGB BLD-MCNC: 11.7 G/DL (ref 12–15.9)
IMM GRANULOCYTES # BLD AUTO: 0.01 10*3/MM3 (ref 0–0.05)
IMM GRANULOCYTES NFR BLD AUTO: 0.1 % (ref 0–0.5)
LYMPHOCYTES # BLD AUTO: 2.65 10*3/MM3 (ref 0.7–3.1)
LYMPHOCYTES NFR BLD AUTO: 38.4 % (ref 19.6–45.3)
MCH RBC QN AUTO: 25.4 PG (ref 26.6–33)
MCHC RBC AUTO-ENTMCNC: 31.1 G/DL (ref 31.5–35.7)
MCV RBC AUTO: 81.7 FL (ref 79–97)
MONOCYTES # BLD AUTO: 0.52 10*3/MM3 (ref 0.1–0.9)
MONOCYTES NFR BLD AUTO: 7.5 % (ref 5–12)
NEUTROPHILS NFR BLD AUTO: 3.72 10*3/MM3 (ref 1.7–7)
NEUTROPHILS NFR BLD AUTO: 53.9 % (ref 42.7–76)
NRBC BLD AUTO-RTO: 0 /100 WBC (ref 0–0.2)
PLATELET # BLD AUTO: 298 10*3/MM3 (ref 140–450)
PMV BLD AUTO: 9.4 FL (ref 6–12)
POTASSIUM SERPL-SCNC: 3.5 MMOL/L (ref 3.5–5.2)
PROT SERPL-MCNC: 8 G/DL (ref 6–8.5)
RBC # BLD AUTO: 4.6 10*6/MM3 (ref 3.77–5.28)
SODIUM SERPL-SCNC: 139 MMOL/L (ref 136–145)
TROPONIN T SERPL HS-MCNC: <6 NG/L
WBC NRBC COR # BLD: 6.91 10*3/MM3 (ref 3.4–10.8)

## 2023-11-14 PROCEDURE — 84484 ASSAY OF TROPONIN QUANT: CPT | Performed by: STUDENT IN AN ORGANIZED HEALTH CARE EDUCATION/TRAINING PROGRAM

## 2023-11-14 PROCEDURE — 36415 COLL VENOUS BLD VENIPUNCTURE: CPT

## 2023-11-14 PROCEDURE — 80053 COMPREHEN METABOLIC PANEL: CPT | Performed by: STUDENT IN AN ORGANIZED HEALTH CARE EDUCATION/TRAINING PROGRAM

## 2023-11-14 PROCEDURE — 93005 ELECTROCARDIOGRAM TRACING: CPT | Performed by: STUDENT IN AN ORGANIZED HEALTH CARE EDUCATION/TRAINING PROGRAM

## 2023-11-14 PROCEDURE — 71045 X-RAY EXAM CHEST 1 VIEW: CPT

## 2023-11-14 PROCEDURE — 85025 COMPLETE CBC W/AUTO DIFF WBC: CPT | Performed by: STUDENT IN AN ORGANIZED HEALTH CARE EDUCATION/TRAINING PROGRAM

## 2023-11-14 RX ORDER — HYDROXYZINE PAMOATE 50 MG/1
50 CAPSULE ORAL ONCE
Status: DISCONTINUED | OUTPATIENT
Start: 2023-11-14 | End: 2023-11-14

## 2023-11-14 NOTE — ED PROVIDER NOTES
Subjective:  History of Present Illness:    Patient is a 28-year-old female with history of ADHD, depression, pots disease, presents today with chest pain.  Reports that she has been downtrending on Effexor, had sudden episode this evening of feeling like her heart rate was fast, chest pain.  Reports that after this, she had severe anxiety and was unable to calm down.  Now presents our emergency department for evaluation.  Denies any cough or congestion prior to arrival.  No shortness of breath.  Denies any abdominal pain, nausea, vomiting, diarrhea.  Well-appearing on exam.      Nurses Notes reviewed and agree, including vitals, allergies, social history and prior medical history.     REVIEW OF SYSTEMS: All systems reviewed and not pertinent unless noted.  Review of Systems   Constitutional:  Positive for activity change. Negative for appetite change, chills, fatigue and fever.   HENT:  Negative for rhinorrhea, sinus pressure and sinus pain.    Eyes:  Negative for discharge and itching.   Respiratory:  Negative for cough and shortness of breath.    Cardiovascular:  Positive for chest pain. Negative for leg swelling.   Gastrointestinal:  Negative for abdominal distention, abdominal pain, nausea and vomiting.   Endocrine: Negative for cold intolerance and heat intolerance.   Genitourinary:  Negative for decreased urine volume, difficulty urinating, flank pain, frequency, urgency, vaginal bleeding, vaginal discharge and vaginal pain.   Musculoskeletal:  Negative for gait problem, neck pain and neck stiffness.   Skin:  Negative for color change.   Allergic/Immunologic: Negative for environmental allergies.   Neurological:  Negative for seizures, syncope, facial asymmetry and speech difficulty.   Psychiatric/Behavioral:  Negative for self-injury and suicidal ideas. The patient is nervous/anxious.        Past Medical History:   Diagnosis Date    Abnormal ECG     Abnormal Pap smear of cervix     ADHD (attention deficit  hyperactivity disorder)     Allergic     Anemia     Anxiety     Cervical dysplasia     Chest pain     Chlamydia     Chronic headaches 2016    Depression     Endometriosis     Gastroesophageal reflux disease 2023    Heart murmur     Hematuria     History of transfusion     Hyperemesis gravidarum     Migraine     Ovarian cyst     Pneumonia 2016    Polycystic ovary syndrome     Scoliosis     Spinal headache     Urinary tract infection     Vasovagal syncope        Allergies:    Iodine, Shellfish allergy, and Penicillins      Past Surgical History:   Procedure Laterality Date    ADENOIDECTOMY       SECTION      COLONOSCOPY N/A 2023    Procedure: COLONOSCOPY WITH BIOPSIES;  Surgeon: Mickie Orellana MD;  Location: [facility] ENDOSCOPY;  Service: Gastroenterology;  Laterality: N/A;    CYSTOSCOPY      urethral stenosis    ENDOSCOPY N/A 2023    Procedure: ESOPHAGOGASTRODUODENOSCOPY WITH BIOPSIES AND POLYPECTOMY;  Surgeon: Mickie Orellana MD;  Location: [facility] ENDOSCOPY;  Service: Gastroenterology;  Laterality: N/A;    TONSILLECTOMY      WISDOM TOOTH EXTRACTION           Social History     Socioeconomic History    Marital status:    Tobacco Use    Smoking status: Never    Smokeless tobacco: Never   Vaping Use    Vaping Use: Never used   Substance and Sexual Activity    Alcohol use: No    Drug use: No    Sexual activity: Yes     Partners: Male     Birth control/protection: None         Family History   Problem Relation Age of Onset    Heart disease Mother     Hypertension Mother     Hyperlipidemia Mother     Heart disease Father     Hypertension Father     Hyperlipidemia Father     Liver disease Father     Heart attack Father     Arthritis Maternal Grandmother     Cancer Maternal Grandmother     Kidney disease Maternal Grandmother     Stroke Maternal Grandmother     Cancer Paternal Grandmother     Cancer Paternal Grandfather     Stroke Paternal Grandfather     Arthritis  "Paternal Grandfather     Diabetes Paternal Grandfather     Heart attack Paternal Grandfather        Objective  Physical Exam:  /80   Pulse 84   Temp 97.9 °F (36.6 °C) (Oral)   Resp 18   Ht 172.7 cm (68\")   Wt 79.4 kg (175 lb)   LMP 10/27/2023 (Exact Date)   SpO2 99%   BMI 26.61 kg/m²      Physical Exam  Constitutional:       General: She is not in acute distress.     Appearance: Normal appearance. She is not ill-appearing.   HENT:      Head: Normocephalic and atraumatic.      Nose: Nose normal. No congestion or rhinorrhea.      Mouth/Throat:      Mouth: Mucous membranes are dry.      Pharynx: Oropharynx is clear. No oropharyngeal exudate or posterior oropharyngeal erythema.   Eyes:      Extraocular Movements: Extraocular movements intact.      Conjunctiva/sclera: Conjunctivae normal.      Pupils: Pupils are equal, round, and reactive to light.   Cardiovascular:      Rate and Rhythm: Normal rate and regular rhythm.      Pulses: Normal pulses.      Heart sounds: Normal heart sounds.   Pulmonary:      Effort: Pulmonary effort is normal. No respiratory distress.      Breath sounds: Normal breath sounds.   Abdominal:      General: Abdomen is flat. Bowel sounds are normal. There is no distension.      Palpations: Abdomen is soft.      Tenderness: There is no abdominal tenderness.   Musculoskeletal:         General: No swelling or tenderness. Normal range of motion.      Cervical back: Normal range of motion and neck supple.   Skin:     General: Skin is warm and dry.      Capillary Refill: Capillary refill takes less than 2 seconds.   Neurological:      General: No focal deficit present.      Mental Status: She is alert and oriented to person, place, and time. Mental status is at baseline.      Cranial Nerves: No cranial nerve deficit.      Sensory: No sensory deficit.      Motor: No weakness.      Coordination: Coordination normal.   Psychiatric:         Mood and Affect: Mood normal.         Behavior: " Behavior normal.         Thought Content: Thought content normal.         Judgment: Judgment normal.         Procedures    ED Course:    ED Course as of 11/14/23 0221 Tue Nov 14, 2023 0022 EKG interpreted by me, normal sinus rhythm no concerning ST changes noted, rate of 80 [JE]      ED Course User Index  [JE] Tom Holliday MD       Lab Results (last 24 hours)       Procedure Component Value Units Date/Time    CBC & Differential [[id number]]  (Abnormal) Collected: 11/14/23 0007    Specimen: Blood Updated: 11/14/23 0012    Narrative:      The following orders were created for panel order CBC & Differential.  Procedure                               Abnormality         Status                     ---------                               -----------         ------                     CBC Auto Differential[[id number]]        Abnormal            Final result                 Please view results for these tests on the individual orders.    Comprehensive Metabolic Panel [[id number]]  (Abnormal) Collected: 11/14/23 0007    Specimen: Blood Updated: 11/14/23 0031     Glucose 115 mg/dL      BUN 9 mg/dL      Creatinine 0.81 mg/dL      Sodium 139 mmol/L      Potassium 3.5 mmol/L      Chloride 100 mmol/L      CO2 28.7 mmol/L      Calcium 10.0 mg/dL      Total Protein 8.0 g/dL      Albumin 4.9 g/dL      ALT (SGPT) 18 U/L      AST (SGOT) 20 U/L      Alkaline Phosphatase 93 U/L      Total Bilirubin 0.2 mg/dL      Globulin 3.1 gm/dL      A/G Ratio 1.6 g/dL      BUN/Creatinine Ratio 11.1     Anion Gap 10.3 mmol/L      eGFR 101.5 mL/min/1.73     Narrative:      GFR Normal >60  Chronic Kidney Disease <60  Kidney Failure <15      High Sensitivity Troponin T [[id number]]  (Normal) Collected: 11/14/23 0007    Specimen: Blood Updated: 11/14/23 0033     HS Troponin T <6 ng/L     Narrative:      High Sensitive Troponin T Reference Range:  <14.0 ng/L- Negative Female for AMI  <22.0 ng/L- Negative Male for AMI  >=14 - Abnormal Female  indicating possible myocardial injury.  >=22 - Abnormal Male indicating possible myocardial injury.   Clinicians would have to utilize clinical acumen, EKG, Troponin, and serial changes to determine if it is an Acute Myocardial Infarction or myocardial injury due to an underlying chronic condition.         CBC Auto Differential [[id number]]  (Abnormal) Collected: 11/14/23 0007    Specimen: Blood Updated: 11/14/23 0012     WBC 6.91 10*3/mm3      RBC 4.60 10*6/mm3      Hemoglobin 11.7 g/dL      Hematocrit 37.6 %      MCV 81.7 fL      MCH 25.4 pg      MCHC 31.1 g/dL      RDW 13.5 %      RDW-SD 40.0 fl      MPV 9.4 fL      Platelets 298 10*3/mm3      Neutrophil % 53.9 %      Lymphocyte % 38.4 %      Monocyte % 7.5 %      Eosinophil % 0.0 %      Basophil % 0.1 %      Immature Grans % 0.1 %      Neutrophils, Absolute 3.72 10*3/mm3      Lymphocytes, Absolute 2.65 10*3/mm3      Monocytes, Absolute 0.52 10*3/mm3      Eosinophils, Absolute 0.00 10*3/mm3      Basophils, Absolute 0.01 10*3/mm3      Immature Grans, Absolute 0.01 10*3/mm3      nRBC 0.0 /100 WBC              No radiology results from the last 24 hrs       MDM     Amount and/or Complexity of Data Reviewed  Independent visualization of images, tracings, or specimens: yes        Initial impression of presenting illness: Chest pain    DDX: includes but is not limited to: ACS, MI, panic disorder    Patient arrives stable with vitals interpreted by myself.     Pertinent features from physical exam: Clear to auscultation, regular rate and rhythm, no murmur.    Initial diagnostic plan: CBC, CMP, troponin, ECG, chest x-ray    Results from initial plan were reviewed and interpreted by me revealing no concern for acute cardiac process    Diagnostic information from other sources: Reviewed past medical records and discussed with family at bedside    Interventions / Re-evaluation: [facility] emergency department for over 2 hours no change in vital signs    Results/clinical rationale  were discussed with patient at bedside    Consultations/Discussion of results with other physicians: Liang negative cardiac work-up in our emergency department.  Given patient's recent downtrend of Effexor, discussed this is likely etiology of her symptoms.  Encouraged her to continue to follow with her psychiatrist for further management of her psychiatric medications.    Disposition plan: Discharge  -----        Final diagnoses:   Chest pain, unspecified type          Tom Holliday MD  11/14/23 022

## 2023-11-14 NOTE — DISCHARGE INSTRUCTIONS
You were evaluated for chest pain.  We got labs and an EKG and a chest x-ray that showed no concern for any acute process.  You are now stable for discharge.  As we discussed, would recommend continue to follow with your psychiatrist and therapist to further discuss your underlying psychiatric processes as well as your medication regimen.  We believe that the symptoms are likely due to a sudden decrease in your medication regimen.  You are now stable for discharge.

## 2023-11-16 NOTE — TELEPHONE ENCOUNTER
[facility] - Jairo Kilgore MD  [address] [facility], [address] [place] [address], [address] [place] [address]  [phone] PT SAID SHE WAS SUPPOSED TO BE GETTING LABS.  SHE WANTS YOU TO ORDER AND SEND TO [facility] ON [address] FAX# [phone].  PLEASE LET PT KNOW WHEN DONE.

## 2023-12-23 DIAGNOSIS — R11.0 NAUSEA: ICD-10-CM

## 2023-12-27 RX ORDER — PROMETHAZINE HYDROCHLORIDE 12.5 MG/1
TABLET ORAL
Qty: 30 TABLET | Refills: 1 | Status: SHIPPED | OUTPATIENT
Start: 2023-12-27

## 2024-08-29 ENCOUNTER — HOSPITAL ENCOUNTER (EMERGENCY)
Facility: HOSPITAL | Age: 29
Discharge: HOME | End: 2024-08-29
Attending: EMERGENCY MEDICINE
Payer: COMMERCIAL

## 2024-08-29 ENCOUNTER — APPOINTMENT (OUTPATIENT)
Dept: GENERAL RADIOLOGY | Facility: HOSPITAL | Age: 29
End: 2024-08-29
Payer: COMMERCIAL

## 2024-08-29 VITALS
DIASTOLIC BLOOD PRESSURE: 77 MMHG | BODY MASS INDEX: 28.79 KG/M2 | TEMPERATURE: 98 F | RESPIRATION RATE: 18 BRPM | WEIGHT: 190 LBS | OXYGEN SATURATION: 98 % | HEART RATE: 108 BPM | HEIGHT: 68 IN | SYSTOLIC BLOOD PRESSURE: 119 MMHG

## 2024-08-29 DIAGNOSIS — U07.1 COVID-19: Primary | ICD-10-CM

## 2024-08-29 LAB
ALBUMIN SERPL-MCNC: 4.4 G/DL (ref 3.5–5.2)
ALBUMIN/GLOB SERPL: 1.5 G/DL
ALP SERPL-CCNC: 90 U/L (ref 39–117)
ALT SERPL W P-5'-P-CCNC: 9 U/L (ref 1–33)
ANION GAP SERPL CALCULATED.3IONS-SCNC: 9.4 MMOL/L (ref 5–15)
AST SERPL-CCNC: 10 U/L (ref 1–32)
BACTERIA UR QL AUTO: ABNORMAL /HPF
BASOPHILS # BLD AUTO: 0.03 10*3/MM3 (ref 0–0.2)
BASOPHILS NFR BLD AUTO: 0.2 % (ref 0–1.5)
BILIRUB SERPL-MCNC: 0.2 MG/DL (ref 0–1.2)
BILIRUB UR QL STRIP: NEGATIVE
BUN SERPL-MCNC: 12 MG/DL (ref 6–20)
BUN/CREAT SERPL: 16.7 (ref 7–25)
CALCIUM SPEC-SCNC: 9.3 MG/DL (ref 8.6–10.5)
CHLORIDE SERPL-SCNC: 104 MMOL/L (ref 98–107)
CLARITY UR: ABNORMAL
CO2 SERPL-SCNC: 23.6 MMOL/L (ref 22–29)
COLOR UR: YELLOW
CREAT SERPL-MCNC: 0.72 MG/DL (ref 0.57–1)
D DIMER PPP FEU-MCNC: 0.29 MCGFEU/ML (ref 0–0.5)
DEPRECATED RDW RBC AUTO: 40.3 FL (ref 37–54)
EGFRCR SERPLBLD CKD-EPI 2021: 116.2 ML/MIN/1.73
EOSINOPHIL # BLD AUTO: 0 10*3/MM3 (ref 0–0.4)
EOSINOPHIL NFR BLD AUTO: 0 % (ref 0.3–6.2)
ERYTHROCYTE [DISTWIDTH] IN BLOOD BY AUTOMATED COUNT: 13.8 % (ref 12.3–15.4)
FLUAV SUBTYP SPEC NAA+PROBE: NOT DETECTED
FLUBV RNA ISLT QL NAA+PROBE: NOT DETECTED
GLOBULIN UR ELPH-MCNC: 3 GM/DL
GLUCOSE SERPL-MCNC: 103 MG/DL (ref 65–99)
GLUCOSE UR STRIP-MCNC: NEGATIVE MG/DL
HCG SERPL QL: NEGATIVE
HCT VFR BLD AUTO: 40 % (ref 34–46.6)
HGB BLD-MCNC: 12.9 G/DL (ref 12–15.9)
HGB UR QL STRIP.AUTO: NEGATIVE
HYALINE CASTS UR QL AUTO: ABNORMAL /LPF
IMM GRANULOCYTES # BLD AUTO: 0.06 10*3/MM3 (ref 0–0.05)
IMM GRANULOCYTES NFR BLD AUTO: 0.5 % (ref 0–0.5)
KETONES UR QL STRIP: NEGATIVE
LEUKOCYTE ESTERASE UR QL STRIP.AUTO: ABNORMAL
LYMPHOCYTES # BLD AUTO: 1.56 10*3/MM3 (ref 0.7–3.1)
LYMPHOCYTES NFR BLD AUTO: 12.6 % (ref 19.6–45.3)
MCH RBC QN AUTO: 26.1 PG (ref 26.6–33)
MCHC RBC AUTO-ENTMCNC: 32.3 G/DL (ref 31.5–35.7)
MCV RBC AUTO: 80.8 FL (ref 79–97)
MONOCYTES # BLD AUTO: 0.76 10*3/MM3 (ref 0.1–0.9)
MONOCYTES NFR BLD AUTO: 6.1 % (ref 5–12)
NEUTROPHILS NFR BLD AUTO: 10.01 10*3/MM3 (ref 1.7–7)
NEUTROPHILS NFR BLD AUTO: 80.6 % (ref 42.7–76)
NITRITE UR QL STRIP: NEGATIVE
NRBC BLD AUTO-RTO: 0 /100 WBC (ref 0–0.2)
PH UR STRIP.AUTO: 5.5 [PH] (ref 5–8)
PLATELET # BLD AUTO: 298 10*3/MM3 (ref 140–450)
PMV BLD AUTO: 9.2 FL (ref 6–12)
POTASSIUM SERPL-SCNC: 4.5 MMOL/L (ref 3.5–5.2)
PROT SERPL-MCNC: 7.4 G/DL (ref 6–8.5)
PROT UR QL STRIP: NEGATIVE
RBC # BLD AUTO: 4.95 10*6/MM3 (ref 3.77–5.28)
RBC # UR STRIP: ABNORMAL /HPF
REF LAB TEST METHOD: ABNORMAL
SARS-COV-2 RNA RESP QL NAA+PROBE: DETECTED
SODIUM SERPL-SCNC: 137 MMOL/L (ref 136–145)
SP GR UR STRIP: 1.01 (ref 1–1.03)
SQUAMOUS #/AREA URNS HPF: ABNORMAL /HPF
TROPONIN T SERPL HS-MCNC: <6 NG/L
UROBILINOGEN UR QL STRIP: ABNORMAL
WBC # UR STRIP: ABNORMAL /HPF
WBC NRBC COR # BLD AUTO: 12.42 10*3/MM3 (ref 3.4–10.8)

## 2024-08-29 PROCEDURE — 71045 X-RAY EXAM CHEST 1 VIEW: CPT

## 2024-08-29 PROCEDURE — 85379 FIBRIN DEGRADATION QUANT: CPT | Performed by: NURSE PRACTITIONER

## 2024-08-29 PROCEDURE — 87086 URINE CULTURE/COLONY COUNT: CPT | Performed by: NURSE PRACTITIONER

## 2024-08-29 PROCEDURE — 36415 COLL VENOUS BLD VENIPUNCTURE: CPT

## 2024-08-29 PROCEDURE — 99284 EMERGENCY DEPT VISIT MOD MDM: CPT

## 2024-08-29 PROCEDURE — 81001 URINALYSIS AUTO W/SCOPE: CPT | Performed by: NURSE PRACTITIONER

## 2024-08-29 PROCEDURE — 80053 COMPREHEN METABOLIC PANEL: CPT | Performed by: NURSE PRACTITIONER

## 2024-08-29 PROCEDURE — 84703 CHORIONIC GONADOTROPIN ASSAY: CPT | Performed by: NURSE PRACTITIONER

## 2024-08-29 PROCEDURE — 87636 SARSCOV2 & INF A&B AMP PRB: CPT | Performed by: NURSE PRACTITIONER

## 2024-08-29 PROCEDURE — 93005 ELECTROCARDIOGRAM TRACING: CPT | Performed by: NURSE PRACTITIONER

## 2024-08-29 PROCEDURE — 84484 ASSAY OF TROPONIN QUANT: CPT | Performed by: NURSE PRACTITIONER

## 2024-08-29 PROCEDURE — 85025 COMPLETE CBC W/AUTO DIFF WBC: CPT | Performed by: NURSE PRACTITIONER

## 2024-08-29 NOTE — ED PROVIDER NOTES
Pt Name: Elen Oates  MRN: [id number]  : 1995  Date of Encounter: 2024    PCP: Maira Paul APRN      Subjective    History of Present Illness:    Chief Complaint: Palpitations, chest pain, syncopal episode    History of Present Illness: Elen Oates is a 29 y.o. female who presents to the ER complaining of chest pain, chest palpitations, syncopal episode that happened right prior to arrival.  Patient states she was recently diagnosed with a urinary tract infection has been taking antibiotics.  States that she woke up from resting after taking her child to school had chest palpitations, chest pain and was lightheaded.  Patient states she has a history of cardiomyopathy.   Pain is described as Dull, Intermittent, and does to radiate   Patient rates pain as a 7 on a ten scale.    Triage Vitals:    ED Triage Vitals   Temp Heart Rate Resp BP SpO2   24 1237 24 1237 24 1237 24 1235 24 1236   98.6 °F (37 °C) 112 16 130/82 98 %      Temp src Heart Rate Source Patient Position BP Location FiO2 (%)   24 1237 24 1237 -- -- --   Oral Monitor          Nurses Notes reviewed and agree, including vitals, allergies, social history and prior medical history.     Iodine, Shellfish allergy, and Penicillins    Past Medical History:   Diagnosis Date    Abnormal ECG     Abnormal Pap smear of cervix     ADHD (attention deficit hyperactivity disorder)     Allergic     Anemia     Anxiety     Cervical dysplasia     Chest pain     Chlamydia     Chronic headaches 2016    Depression     Endometriosis     Gastroesophageal reflux disease 2023    Heart murmur     Hematuria     History of transfusion     Hyperemesis gravidarum     Migraine     Ovarian cyst     Pneumonia 2016    Polycystic ovary syndrome     Scoliosis     Spinal headache     Urinary tract infection     Vasovagal syncope        Past Surgical History:   Procedure Laterality Date    ADENOIDECTOMY        SECTION      COLONOSCOPY N/A 2023    Procedure: COLONOSCOPY WITH BIOPSIES;  Surgeon: Mickie Orellana MD;  Location: [facility] ENDOSCOPY;  Service: Gastroenterology;  Laterality: N/A;    CYSTOSCOPY      urethral stenosis    ENDOSCOPY N/A 2023    Procedure: ESOPHAGOGASTRODUODENOSCOPY WITH BIOPSIES AND POLYPECTOMY;  Surgeon: Mickie Orellana MD;  Location: [facility] ENDOSCOPY;  Service: Gastroenterology;  Laterality: N/A;    TONSILLECTOMY      WISDOM TOOTH EXTRACTION         Social History     Socioeconomic History    Marital status:    Tobacco Use    Smoking status: Never    Smokeless tobacco: Never   Vaping Use    Vaping status: Never Used   Substance and Sexual Activity    Alcohol use: No    Drug use: No    Sexual activity: Yes     Partners: Male     Birth control/protection: None       Family History   Problem Relation Age of Onset    Heart disease Mother     Hypertension Mother     Hyperlipidemia Mother     Heart disease Father     Hypertension Father     Hyperlipidemia Father     Liver disease Father     Heart attack Father     Arthritis Maternal Grandmother     Cancer Maternal Grandmother     Kidney disease Maternal Grandmother     Stroke Maternal Grandmother     Cancer Paternal Grandmother     Cancer Paternal Grandfather     Stroke Paternal Grandfather     Arthritis Paternal Grandfather     Diabetes Paternal Grandfather     Heart attack Paternal Grandfather        REVIEW OF SYSTEMS:     All systems reviewed and not pertinent unless noted.    Review of Systems   Cardiovascular:  Positive for chest pain and palpitations.   Neurological:  Positive for syncope.   All other systems reviewed and are negative.      Objective    Physical Exam  Vitals and nursing note reviewed.   Constitutional:       Appearance: Normal appearance.   HENT:      Head: Normocephalic and atraumatic.   Eyes:      Extraocular Movements: Extraocular movements intact.      Pupils: Pupils are equal, round,  and reactive to light.   Cardiovascular:      Rate and Rhythm: Regular rhythm. Tachycardia present.      Pulses: Normal pulses.           Radial pulses are 1+ on the right side and 1+ on the left side.        Posterior tibial pulses are 1+ on the right side and 1+ on the left side.      Heart sounds: Normal heart sounds.   Pulmonary:      Effort: Pulmonary effort is normal.      Breath sounds: Normal breath sounds.   Abdominal:      General: Abdomen is flat. Bowel sounds are normal.      Palpations: Abdomen is soft.   Musculoskeletal:      Cervical back: Normal range of motion and neck supple.   Skin:     Capillary Refill: Capillary refill takes less than 2 seconds.   Neurological:      General: No focal deficit present.      Mental Status: She is alert and oriented to person, place, and time. Mental status is at baseline.      GCS: GCS eye subscore is 4. GCS verbal subscore is 5. GCS motor subscore is 6.      Sensory: Sensation is intact.      Motor: Motor function is intact.      Gait: Gait is intact.   Psychiatric:         Attention and Perception: Attention and perception normal.         Mood and Affect: Mood and affect normal.         Speech: Speech normal.         Behavior: Behavior normal. Behavior is cooperative.                           Procedures    ED Course:    ECG 12 Lead Chest Pain   Final Result          ED Course as of 08/29/24 1852   Thu Aug 29, 2024   1323 EKG: I reviewed and independently interpreted the EKG as:  Sinus tach rate of 114, normal axis, normal intervals, no ST elevation, no T wave inversions [CS]      ED Course User Index  [CS] Misael Morrison MD       Orders placed during this visit:    Orders Placed This Encounter   Procedures    COVID PRE-OP / PRE-PROCEDURE SCREENING ORDER (NO ISOLATION) - Swab, Nasopharynx    COVID-19 and FLU A/B PCR, 1 HR TAT - Swab, Nasopharynx    Urine Culture - Urine,    XR Chest 1 View    Comprehensive Metabolic Panel    hCG, Serum, Qualitative     Urinalysis With Culture If Indicated - Urine, Clean Catch    D-dimer, Quantitative    High Sensitivity Troponin T    CBC Auto Differential    Urinalysis, Microscopic Only - Urine, Clean Catch    ECG 12 Lead Chest Pain    CBC & Differential       LAB Results:    Lab Results (last 24 hours)       Procedure Component Value Units Date/Time    CBC & Differential [[id number]]  (Abnormal) Collected: 08/29/24 1306    Specimen: Blood Updated: 08/29/24 1317    Narrative:      The following orders were created for panel order CBC & Differential.  Procedure                               Abnormality         Status                     ---------                               -----------         ------                     CBC Auto Differential[[id number]]        Abnormal            Final result                 Please view results for these tests on the individual orders.    Comprehensive Metabolic Panel [[id number]]  (Abnormal) Collected: 08/29/24 1306    Specimen: Blood Updated: 08/29/24 1330     Glucose 103 mg/dL      BUN 12 mg/dL      Creatinine 0.72 mg/dL      Sodium 137 mmol/L      Potassium 4.5 mmol/L      Chloride 104 mmol/L      CO2 23.6 mmol/L      Calcium 9.3 mg/dL      Total Protein 7.4 g/dL      Albumin 4.4 g/dL      ALT (SGPT) 9 U/L      AST (SGOT) 10 U/L      Alkaline Phosphatase 90 U/L      Total Bilirubin 0.2 mg/dL      Globulin 3.0 gm/dL      A/G Ratio 1.5 g/dL      BUN/Creatinine Ratio 16.7     Anion Gap 9.4 mmol/L      eGFR 116.2 mL/min/1.73     Narrative:      GFR Normal >60  Chronic Kidney Disease <60  Kidney Failure <15      hCG, Serum, Qualitative [[id number]]  (Normal) Collected: 08/29/24 1306    Specimen: Blood Updated: 08/29/24 1335     HCG Qualitative Negative    D-dimer, Quantitative [[id number]]  (Normal) Collected: 08/29/24 1306    Specimen: Blood Updated: 08/29/24 1344     D-Dimer, Quantitative 0.29 MCGFEU/mL     Narrative:      According to the assay 's published package insert, a normal  "(<0.50 MCGFEU/mL) D-dimer result in conjunction with a non-high clinical probability assessment, excludes deep vein thrombosis (DVT) and pulmonary embolism (PE) with high sensitivity.    D-dimer values increase with age and this can make VTE exclusion of an older population difficult. To address this, the [employer], based on best available evidence and recent guidelines, recommends that clinicians use age-adjusted D-dimer thresholds in patients greater than 50 years of age with: a) a low probability of PE who do not meet all Pulmonary Embolism Rule Out Criteria, or b) in those with intermediate probability of PE.   The formula for an age-adjusted D-dimer cut-off is \"age/100\".  For example, a 60 year old patient would have an age-adjusted cut-off of 0.60 MCGFEU/mL and an 80 year old 0.80 MCGFEU/mL.    High Sensitivity Troponin T [[id number]]  (Normal) Collected: 08/29/24 1306    Specimen: Blood Updated: 08/29/24 1332     HS Troponin T <6 ng/L     Narrative:      High Sensitive Troponin T Reference Range:  <14.0 ng/L- Negative Female for AMI  <22.0 ng/L- Negative Male for AMI  >=14 - Abnormal Female indicating possible myocardial injury.  >=22 - Abnormal Male indicating possible myocardial injury.   Clinicians would have to utilize clinical acumen, EKG, Troponin, and serial changes to determine if it is an Acute Myocardial Infarction or myocardial injury due to an underlying chronic condition.         CBC Auto Differential [[id number]]  (Abnormal) Collected: 08/29/24 1306    Specimen: Blood Updated: 08/29/24 1317     WBC 12.42 10*3/mm3      RBC 4.95 10*6/mm3      Hemoglobin 12.9 g/dL      Hematocrit 40.0 %      MCV 80.8 fL      MCH 26.1 pg      MCHC 32.3 g/dL      RDW 13.8 %      RDW-SD 40.3 fl      MPV 9.2 fL      Platelets 298 10*3/mm3      Neutrophil % 80.6 %      Lymphocyte % 12.6 %      Monocyte % 6.1 %      Eosinophil % 0.0 %      Basophil % 0.2 %      Immature Grans % 0.5 %      Neutrophils, " Absolute 10.01 10*3/mm3      Lymphocytes, Absolute 1.56 10*3/mm3      Monocytes, Absolute 0.76 10*3/mm3      Eosinophils, Absolute 0.00 10*3/mm3      Basophils, Absolute 0.03 10*3/mm3      Immature Grans, Absolute 0.06 10*3/mm3      nRBC 0.0 /100 WBC     COVID PRE-OP / PRE-PROCEDURE SCREENING ORDER (NO ISOLATION) - Swab, Nasopharynx [[id number]]  (Abnormal) Collected: 08/29/24 1312    Specimen: Swab from Nasopharynx Updated: 08/29/24 1338    Narrative:      The following orders were created for panel order COVID PRE-OP / PRE-PROCEDURE SCREENING ORDER (NO ISOLATION) - Swab, Nasopharynx.  Procedure                               Abnormality         Status                     ---------                               -----------         ------                     COVID-19 and FLU A/B PCR...[[id number]]  Abnormal            Final result                 Please view results for these tests on the individual orders.    COVID-19 and FLU A/B PCR, 1 HR TAT - Swab, Nasopharynx [[id number]]  (Abnormal) Collected: 08/29/24 1312    Specimen: Swab from Nasopharynx Updated: 08/29/24 1338     COVID19 Detected     Influenza A PCR Not Detected     Influenza B PCR Not Detected    Narrative:      Fact sheet for providers: https://www.fda.gov/media/773230/download    Fact sheet for patients: https://www.fda.gov/media/330704/download    Test performed by PCR.  Influenza A and Influenza B negative results should be considered presumptive in samples that have a positive SARS-CoV-2 result.    Competitive inhibition studies showed that SARS-CoV-2 virus, when present at concentrations above 3.6E+04 copies/mL, can inhibit the detection and amplification of influenza A and influenza B virus RNA if present at or below 1.8E+02 copies/mL or 4.9E+02 copies/mL, respectively, and may lead to false negative influenza virus results. If co-infection with influenza A or influenza B virus is suspected in samples with a positive SARS-CoV-2 result, the sample  should be re-tested with another FDA cleared, approved, or authorized influenza test, if influenza virus detection would change clinical management.    Urinalysis With Culture If Indicated - Urine, Clean Catch [[id number]]  (Abnormal) Collected: 08/29/24 1320    Specimen: Urine, Clean Catch Updated: 08/29/24 1341     Color, UA Yellow     Appearance, UA Cloudy     pH, UA 5.5     Specific Gravity, UA 1.014     Glucose, UA Negative     Ketones, UA Negative     Bilirubin, UA Negative     Blood, UA Negative     Protein, UA Negative     Leuk Esterase, UA Large (3+)     Nitrite, UA Negative     Urobilinogen, UA 0.2 E.U./dL    Narrative:      In absence of clinical symptoms, the presence of pyuria, bacteria, and/or nitrites on the urinalysis result does not correlate with infection.    Urinalysis, Microscopic Only - Urine, Clean Catch [[id number]]  (Abnormal) Collected: 08/29/24 1320    Specimen: Urine, Clean Catch Updated: 08/29/24 1400     RBC, UA 0-2 /HPF      WBC, UA Too Numerous to Count /HPF      Bacteria, UA Trace /HPF      Squamous Epithelial Cells, UA 7-12 /HPF      Hyaline Casts, UA 0-2 /LPF      Methodology Manual Light Microscopy    Urine Culture - Urine, Urine, Clean Catch [[id number]] Collected: 08/29/24 1320    Specimen: Urine, Clean Catch Updated: 08/29/24 1400             If labs were ordered, I have independently reviewed the results and considered them in the diagnosis and treatment plan for the patient    RADIOLOGY    XR Chest 1 View    Result Date: 8/29/2024  PROCEDURE: XR CHEST 1 VW-  HISTORY: Chest pain, history of for POTS and cardiomyopathy  COMPARISON: 11/13/2023  FINDINGS:  Portable view of the chest demonstrates the lungs to be grossly clear. There is no evidence of effusion, pneumothorax or other significant pleural disease. The mediastinum is unremarkable.  The heart size is normal.      Impression: Unremarkable portable chest.    This report was signed and finalized on 8/29/2024 1:21 PM by Thor  MD Deann.        If I have ordered, I have independently reviewed the above noted radiographic studies.  Please see the radiologist dictation for the official interpretation    Medications given to patient in the ER    Medications - No data to display    AS OF 18:52 EDT VITALS:    BP - 119/77  HR - 108  TEMP - 98 °F (36.7 °C)  O2 SATS - 98%         Shared Decision Making: After my consideration of the clinical presentation and laboratory/radiology studies obtained, I have discussed the findings with the patient/patient representative who is in agreement with the treatment plan and final disposition. Risks and benefits of discharge and/or observation admission were discussed.  Final disposition of the patient will be discharged home.  Patient is requested to follow-up with primary care provider and specialist in 1 week following final discharge.    Discharge Medications Prescribed:  No new home medications were prescribed during this ER visit    Medical Decision Making   Elen Oates is a 29 y.o. female who presents to the ER complaining of chest pain, chest palpitations, syncopal episode that happened right prior to arrival.  Patient states she was recently diagnosed with a urinary tract infection has been taking antibiotics.  States that she woke up from resting after taking her child to school had chest palpitations, chest pain and was lightheaded.  Patient states she has a history of cardiomyopathy.   Pain is described as Dull, Intermittent, and does to radiate   Patient rates pain as a 7 on a ten scale.    DDX: includes but is not limited to: NSTEMI, STEMI, chest pain unspecified, palpitations, syncope, COVID-19, influenza, viral respiratory illness, other    Problems Addressed:  COVID-19: complicated acute illness or injury    Amount and/or Complexity of Data Reviewed  Labs: ordered. Decision-making details documented in ED Course.     Details: I have personally reviewed and documented all  results  Radiology: ordered. Decision-making details documented in ED Course.     Details: I have personally reviewed and documented all results  ECG/medicine tests: ordered. Decision-making details documented in ED Course.     Details: I have personally reviewed and documented all results  Discussion of management or test interpretation with external provider(s): Discussed assessment, treatment and plan with ER attending    Risk  Risk Details: I have discussed with patient the finding of the test preformed today. Patient has been diagnosed with COVID-19 and will be discharged home.  Patient requested to follow-up with primary care provider within the next 7 days for reevaluation. Strict return precautions have been given and patient verbalizes understanding        Final diagnoses:   COVID-19       Please note that portions of this document were completed using voice recognition dictation software.       Pierce Tang, APRN  08/29/24 [id number]

## 2024-08-31 LAB — BACTERIA SPEC AEROBE CULT: NO GROWTH

## 2024-11-27 ENCOUNTER — APPOINTMENT (OUTPATIENT)
Dept: ULTRASOUND IMAGING | Facility: HOSPITAL | Age: 29
End: 2024-11-27
Payer: COMMERCIAL

## 2024-11-27 ENCOUNTER — APPOINTMENT (OUTPATIENT)
Dept: CT IMAGING | Facility: HOSPITAL | Age: 29
End: 2024-11-27
Payer: COMMERCIAL

## 2024-11-27 ENCOUNTER — HOSPITAL ENCOUNTER (EMERGENCY)
Facility: HOSPITAL | Age: 29
Discharge: HOME | End: 2024-11-27
Attending: STUDENT IN AN ORGANIZED HEALTH CARE EDUCATION/TRAINING PROGRAM
Payer: COMMERCIAL

## 2024-11-27 VITALS
HEIGHT: 68 IN | OXYGEN SATURATION: 97 % | HEART RATE: 83 BPM | RESPIRATION RATE: 16 BRPM | TEMPERATURE: 97.7 F | DIASTOLIC BLOOD PRESSURE: 69 MMHG | WEIGHT: 190.04 LBS | BODY MASS INDEX: 28.8 KG/M2 | SYSTOLIC BLOOD PRESSURE: 111 MMHG

## 2024-11-27 DIAGNOSIS — N30.01 ACUTE CYSTITIS WITH HEMATURIA: ICD-10-CM

## 2024-11-27 DIAGNOSIS — R10.13 EPIGASTRIC ABDOMINAL PAIN: Primary | ICD-10-CM

## 2024-11-27 DIAGNOSIS — R11.2 NAUSEA AND VOMITING, UNSPECIFIED VOMITING TYPE: ICD-10-CM

## 2024-11-27 LAB
ALBUMIN SERPL-MCNC: 4.4 G/DL (ref 3.5–5.2)
ALBUMIN/GLOB SERPL: 1.6 G/DL
ALP SERPL-CCNC: 79 U/L (ref 39–117)
ALT SERPL W P-5'-P-CCNC: 7 U/L (ref 1–33)
ANION GAP SERPL CALCULATED.3IONS-SCNC: 11.6 MMOL/L (ref 5–15)
AST SERPL-CCNC: 12 U/L (ref 1–32)
B-HCG UR QL: NEGATIVE
BACTERIA UR QL AUTO: ABNORMAL /HPF
BASOPHILS # BLD AUTO: 0.03 10*3/MM3 (ref 0–0.2)
BASOPHILS NFR BLD AUTO: 0.4 % (ref 0–1.5)
BILIRUB SERPL-MCNC: 0.3 MG/DL (ref 0–1.2)
BILIRUB UR QL STRIP: NEGATIVE
BUN SERPL-MCNC: 12 MG/DL (ref 6–20)
BUN/CREAT SERPL: 14 (ref 7–25)
CALCIUM SPEC-SCNC: 9 MG/DL (ref 8.6–10.5)
CHLORIDE SERPL-SCNC: 104 MMOL/L (ref 98–107)
CLARITY UR: ABNORMAL
CO2 SERPL-SCNC: 23.4 MMOL/L (ref 22–29)
COLOR UR: YELLOW
CREAT SERPL-MCNC: 0.86 MG/DL (ref 0.57–1)
D-LACTATE SERPL-SCNC: 1.2 MMOL/L (ref 0.5–2)
DEPRECATED RDW RBC AUTO: 38.9 FL (ref 37–54)
EGFRCR SERPLBLD CKD-EPI 2021: 93.9 ML/MIN/1.73
EOSINOPHIL # BLD AUTO: 0.01 10*3/MM3 (ref 0–0.4)
EOSINOPHIL NFR BLD AUTO: 0.1 % (ref 0.3–6.2)
ERYTHROCYTE [DISTWIDTH] IN BLOOD BY AUTOMATED COUNT: 13.2 % (ref 12.3–15.4)
GLOBULIN UR ELPH-MCNC: 2.7 GM/DL
GLUCOSE SERPL-MCNC: 99 MG/DL (ref 65–99)
GLUCOSE UR STRIP-MCNC: NEGATIVE MG/DL
HCT VFR BLD AUTO: 37.5 % (ref 34–46.6)
HGB BLD-MCNC: 12.3 G/DL (ref 12–15.9)
HGB UR QL STRIP.AUTO: NEGATIVE
HOLD SPECIMEN: NORMAL
HOLD SPECIMEN: NORMAL
HYALINE CASTS UR QL AUTO: ABNORMAL /LPF
IMM GRANULOCYTES # BLD AUTO: 0.03 10*3/MM3 (ref 0–0.05)
IMM GRANULOCYTES NFR BLD AUTO: 0.4 % (ref 0–0.5)
KETONES UR QL STRIP: NEGATIVE
LEUKOCYTE ESTERASE UR QL STRIP.AUTO: ABNORMAL
LIPASE SERPL-CCNC: 27 U/L (ref 13–60)
LYMPHOCYTES # BLD AUTO: 1.39 10*3/MM3 (ref 0.7–3.1)
LYMPHOCYTES NFR BLD AUTO: 18.5 % (ref 19.6–45.3)
MCH RBC QN AUTO: 26.6 PG (ref 26.6–33)
MCHC RBC AUTO-ENTMCNC: 32.8 G/DL (ref 31.5–35.7)
MCV RBC AUTO: 81 FL (ref 79–97)
MONOCYTES # BLD AUTO: 0.53 10*3/MM3 (ref 0.1–0.9)
MONOCYTES NFR BLD AUTO: 7.1 % (ref 5–12)
MUCOUS THREADS URNS QL MICRO: ABNORMAL /HPF
NEUTROPHILS NFR BLD AUTO: 5.52 10*3/MM3 (ref 1.7–7)
NEUTROPHILS NFR BLD AUTO: 73.5 % (ref 42.7–76)
NITRITE UR QL STRIP: NEGATIVE
NRBC BLD AUTO-RTO: 0 /100 WBC (ref 0–0.2)
PH UR STRIP.AUTO: 5.5 [PH] (ref 5–8)
PLATELET # BLD AUTO: 252 10*3/MM3 (ref 140–450)
PMV BLD AUTO: 9.9 FL (ref 6–12)
POTASSIUM SERPL-SCNC: 4.3 MMOL/L (ref 3.5–5.2)
PROT SERPL-MCNC: 7.1 G/DL (ref 6–8.5)
PROT UR QL STRIP: ABNORMAL
RBC # BLD AUTO: 4.63 10*6/MM3 (ref 3.77–5.28)
RBC # UR STRIP: ABNORMAL /HPF
REF LAB TEST METHOD: ABNORMAL
SODIUM SERPL-SCNC: 139 MMOL/L (ref 136–145)
SP GR UR STRIP: 1.02 (ref 1–1.03)
SQUAMOUS #/AREA URNS HPF: ABNORMAL /HPF
UROBILINOGEN UR QL STRIP: ABNORMAL
WBC # UR STRIP: ABNORMAL /HPF
WBC NRBC COR # BLD AUTO: 7.51 10*3/MM3 (ref 3.4–10.8)
WHOLE BLOOD HOLD COAG: NORMAL
WHOLE BLOOD HOLD SPECIMEN: NORMAL

## 2024-11-27 PROCEDURE — 81025 URINE PREGNANCY TEST: CPT

## 2024-11-27 PROCEDURE — 87086 URINE CULTURE/COLONY COUNT: CPT

## 2024-11-27 PROCEDURE — 25010000002 HYDROMORPHONE PER 4 MG: Performed by: STUDENT IN AN ORGANIZED HEALTH CARE EDUCATION/TRAINING PROGRAM

## 2024-11-27 PROCEDURE — 25010000002 MORPHINE PER 10 MG: Performed by: STUDENT IN AN ORGANIZED HEALTH CARE EDUCATION/TRAINING PROGRAM

## 2024-11-27 PROCEDURE — 85025 COMPLETE CBC W/AUTO DIFF WBC: CPT | Performed by: STUDENT IN AN ORGANIZED HEALTH CARE EDUCATION/TRAINING PROGRAM

## 2024-11-27 PROCEDURE — 25810000003 SODIUM CHLORIDE 0.9 % SOLUTION

## 2024-11-27 PROCEDURE — 99285 EMERGENCY DEPT VISIT HI MDM: CPT | Performed by: STUDENT IN AN ORGANIZED HEALTH CARE EDUCATION/TRAINING PROGRAM

## 2024-11-27 PROCEDURE — 25010000002 ONDANSETRON PER 1 MG: Performed by: STUDENT IN AN ORGANIZED HEALTH CARE EDUCATION/TRAINING PROGRAM

## 2024-11-27 PROCEDURE — 81001 URINALYSIS AUTO W/SCOPE: CPT | Performed by: STUDENT IN AN ORGANIZED HEALTH CARE EDUCATION/TRAINING PROGRAM

## 2024-11-27 PROCEDURE — 25010000002 DIPHENHYDRAMINE PER 50 MG: Performed by: STUDENT IN AN ORGANIZED HEALTH CARE EDUCATION/TRAINING PROGRAM

## 2024-11-27 PROCEDURE — 83690 ASSAY OF LIPASE: CPT | Performed by: STUDENT IN AN ORGANIZED HEALTH CARE EDUCATION/TRAINING PROGRAM

## 2024-11-27 PROCEDURE — 74177 CT ABD & PELVIS W/CONTRAST: CPT

## 2024-11-27 PROCEDURE — 25510000001 IOPAMIDOL 61 % SOLUTION: Performed by: STUDENT IN AN ORGANIZED HEALTH CARE EDUCATION/TRAINING PROGRAM

## 2024-11-27 PROCEDURE — 76705 ECHO EXAM OF ABDOMEN: CPT

## 2024-11-27 PROCEDURE — 96375 TX/PRO/DX INJ NEW DRUG ADDON: CPT

## 2024-11-27 PROCEDURE — 80053 COMPREHEN METABOLIC PANEL: CPT | Performed by: STUDENT IN AN ORGANIZED HEALTH CARE EDUCATION/TRAINING PROGRAM

## 2024-11-27 PROCEDURE — 83605 ASSAY OF LACTIC ACID: CPT

## 2024-11-27 PROCEDURE — 96374 THER/PROPH/DIAG INJ IV PUSH: CPT

## 2024-11-27 PROCEDURE — 25010000002 METOCLOPRAMIDE PER 10 MG: Performed by: STUDENT IN AN ORGANIZED HEALTH CARE EDUCATION/TRAINING PROGRAM

## 2024-11-27 RX ORDER — METOCLOPRAMIDE HYDROCHLORIDE 5 MG/ML
10 INJECTION INTRAMUSCULAR; INTRAVENOUS ONCE
Status: COMPLETED | OUTPATIENT
Start: 2024-11-27 | End: 2024-11-27

## 2024-11-27 RX ORDER — LIDOCAINE HYDROCHLORIDE 20 MG/ML
10 SOLUTION OROPHARYNGEAL ONCE
Status: COMPLETED | OUTPATIENT
Start: 2024-11-27 | End: 2024-11-27

## 2024-11-27 RX ORDER — DICYCLOMINE HCL 20 MG
20 TABLET ORAL EVERY 6 HOURS PRN
Qty: 28 TABLET | Refills: 0 | Status: SHIPPED | OUTPATIENT
Start: 2024-11-27 | End: 2024-12-04

## 2024-11-27 RX ORDER — DIPHENHYDRAMINE HYDROCHLORIDE 50 MG/ML
25 INJECTION INTRAMUSCULAR; INTRAVENOUS ONCE
Status: COMPLETED | OUTPATIENT
Start: 2024-11-27 | End: 2024-11-27

## 2024-11-27 RX ORDER — SODIUM CHLORIDE 0.9 % (FLUSH) 0.9 %
10 SYRINGE (ML) INJECTION AS NEEDED
Status: DISCONTINUED | OUTPATIENT
Start: 2024-11-27 | End: 2024-11-27 | Stop reason: HOSPADM

## 2024-11-27 RX ORDER — IOPAMIDOL 612 MG/ML
100 INJECTION, SOLUTION INTRAVASCULAR
Status: COMPLETED | OUTPATIENT
Start: 2024-11-27 | End: 2024-11-27

## 2024-11-27 RX ORDER — ONDANSETRON 2 MG/ML
4 INJECTION INTRAMUSCULAR; INTRAVENOUS ONCE
Status: COMPLETED | OUTPATIENT
Start: 2024-11-27 | End: 2024-11-27

## 2024-11-27 RX ORDER — SUCRALFATE ORAL 1 G/10ML
1 SUSPENSION ORAL 3 TIMES DAILY
Qty: 300 ML | Refills: 0 | Status: SHIPPED | OUTPATIENT
Start: 2024-11-27 | End: 2024-12-07

## 2024-11-27 RX ORDER — HYDROMORPHONE HYDROCHLORIDE 2 MG/ML
0.5 INJECTION, SOLUTION INTRAMUSCULAR; INTRAVENOUS; SUBCUTANEOUS ONCE
Status: COMPLETED | OUTPATIENT
Start: 2024-11-27 | End: 2024-11-27

## 2024-11-27 RX ORDER — CIPROFLOXACIN 500 MG/1
500 TABLET, FILM COATED ORAL 2 TIMES DAILY
Qty: 14 TABLET | Refills: 0 | Status: SHIPPED | OUTPATIENT
Start: 2024-11-27 | End: 2024-12-04

## 2024-11-27 RX ADMIN — MORPHINE SULFATE 4 MG: 4 INJECTION, SOLUTION INTRAMUSCULAR; INTRAVENOUS at 12:19

## 2024-11-27 RX ADMIN — DIPHENHYDRAMINE HYDROCHLORIDE 25 MG: 50 INJECTION, SOLUTION INTRAMUSCULAR; INTRAVENOUS at 13:48

## 2024-11-27 RX ADMIN — ONDANSETRON 4 MG: 2 INJECTION INTRAMUSCULAR; INTRAVENOUS at 12:20

## 2024-11-27 RX ADMIN — HYDROMORPHONE HYDROCHLORIDE 0.5 MG: 2 INJECTION INTRAMUSCULAR; INTRAVENOUS; SUBCUTANEOUS at 13:47

## 2024-11-27 RX ADMIN — IOPAMIDOL 100 ML: 612 INJECTION, SOLUTION INTRAVENOUS at 14:58

## 2024-11-27 RX ADMIN — METOCLOPRAMIDE 10 MG: 5 INJECTION, SOLUTION INTRAMUSCULAR; INTRAVENOUS at 13:47

## 2024-11-27 RX ADMIN — ALUMINUM HYDROXIDE, MAGNESIUM HYDROXIDE, AND DIMETHICONE: 400; 400; 40 SUSPENSION ORAL at 16:10

## 2024-11-27 RX ADMIN — SODIUM CHLORIDE 1000 ML: 9 INJECTION, SOLUTION INTRAVENOUS at 12:19

## 2024-11-27 RX ADMIN — LIDOCAINE HYDROCHLORIDE 10 ML: 20 SOLUTION ORAL at 16:10

## 2024-11-27 NOTE — ED PROVIDER NOTES
EMERGENCY DEPARTMENT ENCOUNTER    Pt Name: Elen Oates  MRN: [id number]  Pt :   1995  Room Number:  23SF/23  Date of encounter:  2024  PCP: Maira Paul APRN  ED Provider: Haresh Cummins PA-C    Historian: Patient, nursing notes, patient's  at bedside      HPI:  Chief Complaint: Nausea, vomiting, abdominal pain        Context: Elen Oates is a 29 y.o. female who presents to the ED c/o nausea and vomiting since this morning.  Patient also reports epigastric and right upper quadrant abdominal pain.  Patient states last month she was diagnosed with a mass at the head of her pancreas, since then she has had intermittent pain in this area but since this morning it has been worse.      PAST MEDICAL HISTORY  Past Medical History:   Diagnosis Date    Abnormal ECG     Abnormal Pap smear of cervix     ADHD (attention deficit hyperactivity disorder)     Allergic     Anemia     Anxiety     Cervical dysplasia     Chest pain     Chlamydia     Chronic headaches 2016    Depression     Endometriosis     Gastroesophageal reflux disease 2023    Heart murmur     Hematuria     History of transfusion     Hyperemesis gravidarum     Migraine     Ovarian cyst     Pneumonia 2016    Polycystic ovary syndrome     Scoliosis     Spinal headache     Urinary tract infection     Vasovagal syncope          PAST SURGICAL HISTORY  Past Surgical History:   Procedure Laterality Date    ADENOIDECTOMY       SECTION      COLONOSCOPY N/A 2023    Procedure: COLONOSCOPY WITH BIOPSIES;  Surgeon: Mickie Orellana MD;  Location: [facility] ENDOSCOPY;  Service: Gastroenterology;  Laterality: N/A;    CYSTOSCOPY      urethral stenosis    ENDOSCOPY N/A 2023    Procedure: ESOPHAGOGASTRODUODENOSCOPY WITH BIOPSIES AND POLYPECTOMY;  Surgeon: Mickie Orellana MD;  Location: [facility] ENDOSCOPY;  Service: Gastroenterology;  Laterality: N/A;    TONSILLECTOMY      WISDOM TOOTH EXTRACTION            FAMILY HISTORY  Family History   Problem Relation Age of Onset    Heart disease Mother     Hypertension Mother     Hyperlipidemia Mother     Heart disease Father     Hypertension Father     Hyperlipidemia Father     Liver disease Father     Heart attack Father     Arthritis Maternal Grandmother     Cancer Maternal Grandmother     Kidney disease Maternal Grandmother     Stroke Maternal Grandmother     Cancer Paternal Grandmother     Cancer Paternal Grandfather     Stroke Paternal Grandfather     Arthritis Paternal Grandfather     Diabetes Paternal Grandfather     Heart attack Paternal Grandfather          SOCIAL HISTORY  Social History     Socioeconomic History    Marital status:    Tobacco Use    Smoking status: Never    Smokeless tobacco: Never   Vaping Use    Vaping status: Never Used   Substance and Sexual Activity    Alcohol use: No    Drug use: No    Sexual activity: Yes     Partners: Male     Birth control/protection: None         ALLERGIES  Iodine, Shellfish allergy, and Penicillins        REVIEW OF SYSTEMS  Review of Systems   Constitutional:  Negative for chills and fever.   HENT:  Negative for congestion and sore throat.    Respiratory:  Negative for cough and shortness of breath.    Cardiovascular:  Negative for chest pain.   Gastrointestinal:  Positive for abdominal pain, nausea and vomiting.   Genitourinary:  Negative for dysuria.   Musculoskeletal:  Negative for back pain.   Skin:  Negative for wound.   Neurological:  Negative for dizziness and headaches.   Psychiatric/Behavioral:  Negative for confusion.    All other systems reviewed and are negative.         All systems reviewed and negative except for those discussed in HPI.       PHYSICAL EXAM    I have reviewed the triage vital signs and nursing notes.    ED Triage Vitals [11/27/24 1150]   Temp Heart Rate Resp BP SpO2   97.7 °F (36.5 °C) 99 22 108/63 99 %      Temp src Heart Rate Source Patient Position BP Location FiO2 (%)   Oral  Monitor Sitting Left arm --       Physical Exam  Vitals and nursing note reviewed.   Constitutional:       General: She is not in acute distress.     Appearance: She is not ill-appearing, toxic-appearing or diaphoretic.   HENT:      Head: Normocephalic and atraumatic.      Mouth/Throat:      Mouth: Mucous membranes are moist.      Pharynx: Oropharynx is clear.   Eyes:      Extraocular Movements: Extraocular movements intact.   Cardiovascular:      Rate and Rhythm: Normal rate.      Heart sounds: Normal heart sounds.   Pulmonary:      Effort: Pulmonary effort is normal. No respiratory distress.      Breath sounds: Normal breath sounds.   Abdominal:      Tenderness: There is abdominal tenderness in the right upper quadrant and epigastric area. There is no right CVA tenderness, left CVA tenderness, guarding or rebound.   Skin:     General: Skin is warm and dry.      Findings: No rash.   Neurological:      Mental Status: She is alert.             LAB RESULTS  Recent Results (from the past 24 hours)   Hepatitis B Surface Antibody    Collection Time: 11/26/24  1:00 PM    Specimen: Blood   Result Value Ref Range    Hep B S Ab Reactive    Green Top (Gel)    Collection Time: 11/27/24 11:55 AM   Result Value Ref Range    Extra Tube Hold for add-ons.    Lavender Top    Collection Time: 11/27/24 11:55 AM   Result Value Ref Range    Extra Tube hold for add-on    Gold Top - SST    Collection Time: 11/27/24 11:55 AM   Result Value Ref Range    Extra Tube Hold for add-ons.    Light Blue Top    Collection Time: 11/27/24 11:55 AM   Result Value Ref Range    Extra Tube Hold for add-ons.    CBC Auto Differential    Collection Time: 11/27/24 11:55 AM    Specimen: Blood   Result Value Ref Range    WBC 7.51 3.40 - 10.80 10*3/mm3    RBC 4.63 3.77 - 5.28 10*6/mm3    Hemoglobin 12.3 12.0 - 15.9 g/dL    Hematocrit 37.5 34.0 - 46.6 %    MCV 81.0 79.0 - 97.0 fL    MCH 26.6 26.6 - 33.0 pg    MCHC 32.8 31.5 - 35.7 g/dL    RDW 13.2 12.3 - 15.4 %     RDW-SD 38.9 37.0 - 54.0 fl    MPV 9.9 6.0 - 12.0 fL    Platelets 252 140 - 450 10*3/mm3    Neutrophil % 73.5 42.7 - 76.0 %    Lymphocyte % 18.5 (L) 19.6 - 45.3 %    Monocyte % 7.1 5.0 - 12.0 %    Eosinophil % 0.1 (L) 0.3 - 6.2 %    Basophil % 0.4 0.0 - 1.5 %    Immature Grans % 0.4 0.0 - 0.5 %    Neutrophils, Absolute 5.52 1.70 - 7.00 10*3/mm3    Lymphocytes, Absolute 1.39 0.70 - 3.10 10*3/mm3    Monocytes, Absolute 0.53 0.10 - 0.90 10*3/mm3    Eosinophils, Absolute 0.01 0.00 - 0.40 10*3/mm3    Basophils, Absolute 0.03 0.00 - 0.20 10*3/mm3    Immature Grans, Absolute 0.03 0.00 - 0.05 10*3/mm3    nRBC 0.0 0.0 - 0.2 /100 WBC   Urinalysis With Microscopic If Indicated (No Culture) - Urine, Clean Catch    Collection Time: 11/27/24 11:57 AM    Specimen: Urine, Clean Catch   Result Value Ref Range    Color, UA Yellow Yellow, Straw    Appearance, UA Cloudy (A) Clear    pH, UA 5.5 5.0 - 8.0    Specific Gravity, UA 1.022 1.005 - 1.030    Glucose, UA Negative Negative    Ketones, UA Negative Negative    Bilirubin, UA Negative Negative    Blood, UA Negative Negative    Protein, UA Trace (A) Negative    Leuk Esterase, UA Moderate (2+) (A) Negative    Nitrite, UA Negative Negative    Urobilinogen, UA 0.2 E.U./dL 0.2 - 1.0 E.U./dL   Urinalysis, Microscopic Only - Urine, Clean Catch    Collection Time: 11/27/24 11:57 AM    Specimen: Urine, Clean Catch   Result Value Ref Range    RBC, UA 6-10 (A) None Seen, 0-2 /HPF    WBC, UA 11-20 (A) None Seen, 0-2 /HPF    Bacteria, UA 2+ (A) None Seen /HPF    Squamous Epithelial Cells, UA 7-12 (A) None Seen, 0-2 /HPF    Hyaline Casts, UA None Seen None Seen /LPF    Mucus, UA Small/1+ (A) None Seen, Trace /HPF    Methodology Manual Light Microscopy        If labs were ordered, I independently reviewed the results and considered them in treating the patient.        RADIOLOGY  No Radiology Exams Resulted Within Past 24 Hours    I ordered and independently reviewed the above noted radiographic  studies.      I viewed images of *** which showed *** per my independent interpretation.    See radiologist's dictation for official interpretation.        PROCEDURES    Procedures    No orders to display       MEDICATIONS GIVEN IN ER    Medications   sodium chloride 0.9 % flush 10 mL (has no administration in time range)   morphine injection 4 mg (has no administration in time range)   ondansetron (ZOFRAN) injection 4 mg (has no administration in time range)         MEDICAL DECISION MAKING, PROGRESS, and CONSULTS    All labs, if obtained, have been independently reviewed by me.  All radiology studies, if obtained, have been reviewed by me and the radiologist dictating the report.  All EKG's, if obtained, have been independently viewed and interpreted by me/my attending physician.      Discussion below represents my analysis of pertinent findings related to patient's condition, differential diagnosis, treatment plan and final disposition.                         Differential diagnosis:    Differential diagnosis included but was not limited to ***      Additional sources:    - Discussed/ obtained information from independent historians:      - External (non-ED) record review:      - Chronic or social conditions impacting care:      Orders placed during this visit:  Orders Placed This Encounter   Procedures    Urine Culture - Urine,    US Gallbladder    [place] Draw    Comprehensive Metabolic Panel    Lipase    Urinalysis With Microscopic If Indicated (No Culture) - Urine, Clean Catch    CBC Auto Differential    Urinalysis, Microscopic Only - Urine, Clean Catch    Lactic Acid, Plasma    Pregnancy, Urine - Urine, Clean Catch    NPO Diet NPO Type: Strict NPO    Undress & Gown    Insert Peripheral IV    CBC & Differential    Green Top (Gel)    Lavender Top    Gold Top - SST    Light Blue Top         Additional orders considered but not ordered:      ED Course:    Consultants:      ED Course as of 11/27/24 1215   Wed Nov  27, 2024   1214 WBC, UA(!): 11-20 [TG]   1215 Leukocytes, UA(!): Moderate (2+) [TG]   1215 RBC, UA(!): 6-10 [TG]   1215 Bacteria, UA(!): 2+ [TG]      ED Course User Index  [TG] Haresh Cummins PA-C              Shared Decision Making:  After my consideration of clinical presentation and any laboratory/radiology studies obtained, I discussed the findings with the patient/patient representative who is in agreement with the treatment plan and the final disposition.   Risks and benefits of discharge and/or observation/admission were discussed.       AS OF 12:15 EST VITALS:    BP - 108/63  HR - 99  TEMP - 97.7 °F (36.5 °C) (Oral)  O2 SATS - 99%                  DIAGNOSIS  Final diagnoses:   None         DISPOSITION  ***      Please note that portions of this document were completed with voice recognition software.

## 2024-11-27 NOTE — DISCHARGE INSTRUCTIONS
Contact the office of Dr. Orellana Friday to move your appointment up to his soon as possible regarding your nausea and vomiting symptoms, epigastric pain, and the prominence of the pancreatic neck seen on your CT scan from October 2024.  Your CT scan today was unable to clearly visualize your pancreas, but you did have a distended bladder and evidence of urinary tract infection seen on scan today.  We will treat you for urinary tract infection with Cipro antibiotics and provide you with Bentyl medication as well as Carafate to help with your nausea vomiting symptoms.      Please return to the ER for any acute changes or worsening of your condition right away.

## 2024-11-28 LAB — BACTERIA SPEC AEROBE CULT: NORMAL

## 2024-12-20 ENCOUNTER — TELEPHONE (OUTPATIENT)
Dept: GASTROENTEROLOGY | Facility: CLINIC | Age: 29
End: 2024-12-20
Payer: COMMERCIAL

## 2024-12-20 NOTE — TELEPHONE ENCOUNTER
Called patient - left message to call office [phone] choose option 4 - to discuss appointment request

## 2024-12-21 ENCOUNTER — HOSPITAL ENCOUNTER (EMERGENCY)
Facility: HOSPITAL | Age: 29
Discharge: HOME | End: 2024-12-22
Attending: EMERGENCY MEDICINE | Admitting: EMERGENCY MEDICINE
Payer: COMMERCIAL

## 2024-12-21 ENCOUNTER — APPOINTMENT (OUTPATIENT)
Dept: CT IMAGING | Facility: HOSPITAL | Age: 29
End: 2024-12-21
Payer: COMMERCIAL

## 2024-12-21 DIAGNOSIS — K59.00 CONSTIPATION, UNSPECIFIED CONSTIPATION TYPE: Primary | ICD-10-CM

## 2024-12-21 DIAGNOSIS — N83.202 CYST OF LEFT OVARY: ICD-10-CM

## 2024-12-21 DIAGNOSIS — N39.0 URINARY TRACT INFECTION WITHOUT HEMATURIA, SITE UNSPECIFIED: ICD-10-CM

## 2024-12-21 DIAGNOSIS — R10.9 ABDOMINAL PAIN, UNSPECIFIED ABDOMINAL LOCATION: ICD-10-CM

## 2024-12-21 PROCEDURE — 96361 HYDRATE IV INFUSION ADD-ON: CPT

## 2024-12-21 PROCEDURE — 25810000003 SODIUM CHLORIDE 0.9 % SOLUTION

## 2024-12-21 PROCEDURE — 25010000002 PROCHLORPERAZINE 10 MG/2ML SOLUTION

## 2024-12-21 PROCEDURE — 80053 COMPREHEN METABOLIC PANEL: CPT

## 2024-12-21 PROCEDURE — 99284 EMERGENCY DEPT VISIT MOD MDM: CPT | Performed by: EMERGENCY MEDICINE

## 2024-12-21 PROCEDURE — 74176 CT ABD & PELVIS W/O CONTRAST: CPT

## 2024-12-21 PROCEDURE — 83690 ASSAY OF LIPASE: CPT

## 2024-12-21 PROCEDURE — 96375 TX/PRO/DX INJ NEW DRUG ADDON: CPT

## 2024-12-21 PROCEDURE — 25010000002 DIPHENHYDRAMINE PER 50 MG

## 2024-12-21 PROCEDURE — 83605 ASSAY OF LACTIC ACID: CPT

## 2024-12-21 PROCEDURE — 85025 COMPLETE CBC W/AUTO DIFF WBC: CPT

## 2024-12-21 PROCEDURE — 25010000002 KETOROLAC TROMETHAMINE PER 15 MG

## 2024-12-21 RX ORDER — DIPHENHYDRAMINE HYDROCHLORIDE 50 MG/ML
25 INJECTION INTRAMUSCULAR; INTRAVENOUS ONCE
Status: COMPLETED | OUTPATIENT
Start: 2024-12-22 | End: 2024-12-21

## 2024-12-21 RX ORDER — METOCLOPRAMIDE HYDROCHLORIDE 5 MG/ML
5 INJECTION INTRAMUSCULAR; INTRAVENOUS ONCE
Status: DISCONTINUED | OUTPATIENT
Start: 2024-12-22 | End: 2024-12-21

## 2024-12-21 RX ORDER — KETOROLAC TROMETHAMINE 30 MG/ML
15 INJECTION, SOLUTION INTRAMUSCULAR; INTRAVENOUS ONCE
Status: COMPLETED | OUTPATIENT
Start: 2024-12-22 | End: 2024-12-21

## 2024-12-21 RX ORDER — PROCHLORPERAZINE EDISYLATE 5 MG/ML
5 INJECTION INTRAMUSCULAR; INTRAVENOUS ONCE
Status: COMPLETED | OUTPATIENT
Start: 2024-12-22 | End: 2024-12-21

## 2024-12-21 RX ADMIN — PROCHLORPERAZINE EDISYLATE 5 MG: 5 INJECTION INTRAMUSCULAR; INTRAVENOUS at 23:58

## 2024-12-21 RX ADMIN — KETOROLAC TROMETHAMINE 15 MG: 30 INJECTION, SOLUTION INTRAMUSCULAR; INTRAVENOUS at 23:58

## 2024-12-21 RX ADMIN — SODIUM CHLORIDE 500 ML: 9 INJECTION, SOLUTION INTRAVENOUS at 23:58

## 2024-12-21 RX ADMIN — DIPHENHYDRAMINE HYDROCHLORIDE 25 MG: 50 INJECTION, SOLUTION INTRAMUSCULAR; INTRAVENOUS at 23:58

## 2024-12-21 NOTE — Clinical Note
[facility] EMERGENCY DEPARTMENT  [address] [address]  Phone: [phone]    Elen Cristofer Oates was seen and treated in our emergency department on 12/21/2024.  She may return to work on 12/24/2024.         Thank you for choosing [facility].    Devante Tong PA-C

## 2024-12-22 VITALS
SYSTOLIC BLOOD PRESSURE: 116 MMHG | DIASTOLIC BLOOD PRESSURE: 72 MMHG | TEMPERATURE: 97.8 F | RESPIRATION RATE: 18 BRPM | HEIGHT: 68 IN | HEART RATE: 84 BPM | OXYGEN SATURATION: 97 % | WEIGHT: 190 LBS | BODY MASS INDEX: 28.79 KG/M2

## 2024-12-22 LAB
ALBUMIN SERPL-MCNC: 4.4 G/DL (ref 3.5–5.2)
ALBUMIN/GLOB SERPL: 1.8 G/DL
ALP SERPL-CCNC: 81 U/L (ref 39–117)
ALT SERPL W P-5'-P-CCNC: 9 U/L (ref 1–33)
ANION GAP SERPL CALCULATED.3IONS-SCNC: 14.2 MMOL/L (ref 5–15)
AST SERPL-CCNC: 15 U/L (ref 1–32)
B-HCG UR QL: NEGATIVE
BACTERIA UR QL AUTO: ABNORMAL /HPF
BASOPHILS # BLD AUTO: 0.02 10*3/MM3 (ref 0–0.2)
BASOPHILS NFR BLD AUTO: 0.2 % (ref 0–1.5)
BILIRUB SERPL-MCNC: 0.2 MG/DL (ref 0–1.2)
BILIRUB UR QL STRIP: NEGATIVE
BUN SERPL-MCNC: 11 MG/DL (ref 6–20)
BUN/CREAT SERPL: 12.1 (ref 7–25)
CALCIUM SPEC-SCNC: 9 MG/DL (ref 8.6–10.5)
CHLORIDE SERPL-SCNC: 102 MMOL/L (ref 98–107)
CLARITY UR: ABNORMAL
CO2 SERPL-SCNC: 22.8 MMOL/L (ref 22–29)
COLOR UR: YELLOW
CREAT SERPL-MCNC: 0.91 MG/DL (ref 0.57–1)
D-LACTATE SERPL-SCNC: 2.2 MMOL/L (ref 0.5–2)
DEPRECATED RDW RBC AUTO: 38.5 FL (ref 37–54)
EGFRCR SERPLBLD CKD-EPI 2021: 87.8 ML/MIN/1.73
EOSINOPHIL # BLD AUTO: 0 10*3/MM3 (ref 0–0.4)
EOSINOPHIL NFR BLD AUTO: 0 % (ref 0.3–6.2)
ERYTHROCYTE [DISTWIDTH] IN BLOOD BY AUTOMATED COUNT: 12.9 % (ref 12.3–15.4)
GLOBULIN UR ELPH-MCNC: 2.5 GM/DL
GLUCOSE SERPL-MCNC: 129 MG/DL (ref 65–99)
GLUCOSE UR STRIP-MCNC: NEGATIVE MG/DL
HCT VFR BLD AUTO: 37.8 % (ref 34–46.6)
HGB BLD-MCNC: 12.3 G/DL (ref 12–15.9)
HGB UR QL STRIP.AUTO: NEGATIVE
HYALINE CASTS UR QL AUTO: ABNORMAL /LPF
IMM GRANULOCYTES # BLD AUTO: 0.04 10*3/MM3 (ref 0–0.05)
IMM GRANULOCYTES NFR BLD AUTO: 0.4 % (ref 0–0.5)
KETONES UR QL STRIP: NEGATIVE
LEUKOCYTE ESTERASE UR QL STRIP.AUTO: ABNORMAL
LIPASE SERPL-CCNC: 25 U/L (ref 13–60)
LYMPHOCYTES # BLD AUTO: 1.69 10*3/MM3 (ref 0.7–3.1)
LYMPHOCYTES NFR BLD AUTO: 15.8 % (ref 19.6–45.3)
MCH RBC QN AUTO: 26.7 PG (ref 26.6–33)
MCHC RBC AUTO-ENTMCNC: 32.5 G/DL (ref 31.5–35.7)
MCV RBC AUTO: 82 FL (ref 79–97)
MONOCYTES # BLD AUTO: 0.67 10*3/MM3 (ref 0.1–0.9)
MONOCYTES NFR BLD AUTO: 6.3 % (ref 5–12)
MUCOUS THREADS URNS QL MICRO: ABNORMAL /HPF
NEUTROPHILS NFR BLD AUTO: 77.3 % (ref 42.7–76)
NEUTROPHILS NFR BLD AUTO: 8.29 10*3/MM3 (ref 1.7–7)
NITRITE UR QL STRIP: NEGATIVE
NRBC BLD AUTO-RTO: 0 /100 WBC (ref 0–0.2)
PH UR STRIP.AUTO: 6 [PH] (ref 5–8)
PLATELET # BLD AUTO: 261 10*3/MM3 (ref 140–450)
PMV BLD AUTO: 9.5 FL (ref 6–12)
POTASSIUM SERPL-SCNC: 3.5 MMOL/L (ref 3.5–5.2)
PROT SERPL-MCNC: 6.9 G/DL (ref 6–8.5)
PROT UR QL STRIP: NEGATIVE
RBC # BLD AUTO: 4.61 10*6/MM3 (ref 3.77–5.28)
RBC # UR STRIP: ABNORMAL /HPF
REF LAB TEST METHOD: ABNORMAL
SODIUM SERPL-SCNC: 139 MMOL/L (ref 136–145)
SP GR UR STRIP: 1.02 (ref 1–1.03)
SQUAMOUS #/AREA URNS HPF: ABNORMAL /HPF
UROBILINOGEN UR QL STRIP: ABNORMAL
WBC # UR STRIP: ABNORMAL /HPF
WBC NRBC COR # BLD AUTO: 10.71 10*3/MM3 (ref 3.4–10.8)

## 2024-12-22 PROCEDURE — 25010000002 CEFTRIAXONE PER 250 MG

## 2024-12-22 PROCEDURE — 81025 URINE PREGNANCY TEST: CPT

## 2024-12-22 PROCEDURE — 96361 HYDRATE IV INFUSION ADD-ON: CPT

## 2024-12-22 PROCEDURE — 81001 URINALYSIS AUTO W/SCOPE: CPT

## 2024-12-22 PROCEDURE — 87086 URINE CULTURE/COLONY COUNT: CPT

## 2024-12-22 PROCEDURE — 25810000003 SODIUM CHLORIDE 0.9 % SOLUTION

## 2024-12-22 PROCEDURE — 96365 THER/PROPH/DIAG IV INF INIT: CPT

## 2024-12-22 RX ORDER — CEPHALEXIN 500 MG/1
500 CAPSULE ORAL 2 TIMES DAILY
Qty: 14 CAPSULE | Refills: 0 | Status: SHIPPED | OUTPATIENT
Start: 2024-12-22 | End: 2024-12-29

## 2024-12-22 RX ORDER — PROMETHAZINE HYDROCHLORIDE 25 MG/1
25 TABLET ORAL EVERY 6 HOURS PRN
Qty: 10 TABLET | Refills: 0 | Status: SHIPPED | OUTPATIENT
Start: 2024-12-22

## 2024-12-22 RX ADMIN — SODIUM CHLORIDE 500 ML: 9 INJECTION, SOLUTION INTRAVENOUS at 01:13

## 2024-12-22 RX ADMIN — SODIUM CHLORIDE 1000 MG: 9 INJECTION, SOLUTION INTRAVENOUS at 01:53

## 2024-12-22 NOTE — DISCHARGE INSTRUCTIONS
Follow-up closely with GI as planned, I have sent a bottle of magnesium citrate to help get you stooling, when you start stooling,  you can take a capful of MiraLAX daily as needed to help with soft stools, I have also sent Phenergan for nausea, Keflex to treat UTI to your pharmacy.

## 2024-12-22 NOTE — ED PROVIDER NOTES
Subjective  History of Present Illness:    Patient is a 29-year-old female, history of recently diagnosed pancreatic mass in the last 2 months, was supposed to follow-up with gastroenterology this past Monday but reports she missed her appointment secondary to having an episode similar to this and not able to leave the house, had Reglan prior to arrival having continued nausea and vomiting and pain.  Patient reports left upper quadrant epigastric abdominal discomfort no chest pain no shortness of breath, no fevers, no diarrhea, no urinary symptoms.  Patient arrives via EMS, rates pain is not out of 10, patient uncomfortable appearing on arrival.      Nurses Notes reviewed and agree, including vitals, allergies, social history and prior medical history.     REVIEW OF SYSTEMS: All systems reviewed and not pertinent unless noted.  Review of Systems   Constitutional:  Negative for fever.   Respiratory:  Negative for shortness of breath.    Cardiovascular:  Negative for chest pain.   Gastrointestinal:  Positive for abdominal pain, nausea and vomiting. Negative for constipation and diarrhea.   Genitourinary:  Negative for dysuria, frequency, hematuria and urgency.   All other systems reviewed and are negative.      Past Medical History:   Diagnosis Date    Abnormal ECG     Abnormal Pap smear of cervix     ADHD (attention deficit hyperactivity disorder)     Allergic     Anemia     Anxiety     Cervical dysplasia     Chest pain     Chlamydia     Chronic headaches 09/13/2016    Depression     Endometriosis     Gastroesophageal reflux disease 05/11/2023    Heart murmur     Hematuria     History of transfusion     Hyperemesis gravidarum     Migraine     Ovarian cyst     Pneumonia 09/13/2016    Polycystic ovary syndrome     Scoliosis     Spinal headache     Urinary tract infection     Vasovagal syncope        Allergies:    Iodine, Shellfish allergy, and Penicillins      Past Surgical History:   Procedure Laterality Date     "ADENOIDECTOMY       SECTION      COLONOSCOPY N/A 2023    Procedure: COLONOSCOPY WITH BIOPSIES;  Surgeon: Mickie Orellana MD;  Location: [facility] ENDOSCOPY;  Service: Gastroenterology;  Laterality: N/A;    CYSTOSCOPY      urethral stenosis    ENDOSCOPY N/A 2023    Procedure: ESOPHAGOGASTRODUODENOSCOPY WITH BIOPSIES AND POLYPECTOMY;  Surgeon: Mickie Orellana MD;  Location: [facility] ENDOSCOPY;  Service: Gastroenterology;  Laterality: N/A;    TONSILLECTOMY      WISDOM TOOTH EXTRACTION           Social History     Socioeconomic History    Marital status:    Tobacco Use    Smoking status: Never    Smokeless tobacco: Never   Vaping Use    Vaping status: Never Used   Substance and Sexual Activity    Alcohol use: No    Drug use: No    Sexual activity: Yes     Partners: Male     Birth control/protection: None         Family History   Problem Relation Age of Onset    Heart disease Mother     Hypertension Mother     Hyperlipidemia Mother     Heart disease Father     Hypertension Father     Hyperlipidemia Father     Liver disease Father     Heart attack Father     Arthritis Maternal Grandmother     Cancer Maternal Grandmother     Kidney disease Maternal Grandmother     Stroke Maternal Grandmother     Cancer Paternal Grandmother     Cancer Paternal Grandfather     Stroke Paternal Grandfather     Arthritis Paternal Grandfather     Diabetes Paternal Grandfather     Heart attack Paternal Grandfather        Objective  Physical Exam:  /72   Pulse 95   Temp 97.8 °F (36.6 °C) (Oral)   Resp 18   Ht 172.7 cm (68\")   Wt 86.2 kg (190 lb)   LMP 2024 (Approximate)   SpO2 99%   BMI 28.89 kg/m²      Physical Exam  Vitals and nursing note reviewed.   Constitutional:       General: She is in acute distress.      Appearance: She is well-developed and normal weight. She is not ill-appearing, toxic-appearing or diaphoretic.   HENT:      Head: Normocephalic and atraumatic.      Mouth/Throat:      " Mouth: Mucous membranes are moist.      Pharynx: Oropharynx is clear.   Eyes:      Extraocular Movements: Extraocular movements intact.   Cardiovascular:      Comments: Appears well-perfused  Pulmonary:      Effort: Pulmonary effort is normal. No respiratory distress.   Abdominal:      General: Abdomen is flat.      Palpations: Abdomen is soft.      Tenderness: There is generalized abdominal tenderness and tenderness in the epigastric area and left upper quadrant. There is no right CVA tenderness, left CVA tenderness, guarding or rebound.      Hernia: No hernia is present.   Skin:     General: Skin is warm and dry.      Capillary Refill: Capillary refill takes less than 2 seconds.   Neurological:      General: No focal deficit present.      Mental Status: She is alert and oriented to person, place, and time.   Psychiatric:         Mood and Affect: Mood is anxious.         Behavior: Behavior normal.               Procedures    ED Course:    ED Course as of 12/22/24 0150   Sun Dec 22, 2024   0016 WBC: 10.71 [JR]   0052 Lactate(!!): 2.2 [JR]   0111 Leukocytes, UA(!): Moderate (2+) [JR]   0111 WBC, UA(!): 11-20 [JR]   0111 Bacteria, UA(!): 2+ [JR]      ED Course User Index  [JR] Devante Tong PA-C       Lab Results (last 24 hours)       Procedure Component Value Units Date/Time    CBC Auto Differential [[id number]]  (Abnormal) Collected: 12/21/24 8905    Specimen: Blood Updated: 12/22/24 0000     WBC 10.71 10*3/mm3      RBC 4.61 10*6/mm3      Hemoglobin 12.3 g/dL      Hematocrit 37.8 %      MCV 82.0 fL      MCH 26.7 pg      MCHC 32.5 g/dL      RDW 12.9 %      RDW-SD 38.5 fl      MPV 9.5 fL      Platelets 261 10*3/mm3      Neutrophil % 77.3 %      Lymphocyte % 15.8 %      Monocyte % 6.3 %      Eosinophil % 0.0 %      Basophil % 0.2 %      Immature Grans % 0.4 %      Neutrophils, Absolute 8.29 10*3/mm3      Lymphocytes, Absolute 1.69 10*3/mm3      Monocytes, Absolute 0.67 10*3/mm3      Eosinophils, Absolute 0.00  10*3/mm3      Basophils, Absolute 0.02 10*3/mm3      Immature Grans, Absolute 0.04 10*3/mm3      nRBC 0.0 /100 WBC     Comprehensive Metabolic Panel [[id number]]  (Abnormal) Collected: 12/21/24 2353    Specimen: Blood Updated: 12/22/24 0019     Glucose 129 mg/dL      BUN 11 mg/dL      Creatinine 0.91 mg/dL      Sodium 139 mmol/L      Potassium 3.5 mmol/L      Chloride 102 mmol/L      CO2 22.8 mmol/L      Calcium 9.0 mg/dL      Total Protein 6.9 g/dL      Albumin 4.4 g/dL      ALT (SGPT) 9 U/L      AST (SGOT) 15 U/L      Alkaline Phosphatase 81 U/L      Total Bilirubin 0.2 mg/dL      Globulin 2.5 gm/dL      A/G Ratio 1.8 g/dL      BUN/Creatinine Ratio 12.1     Anion Gap 14.2 mmol/L      eGFR 87.8 mL/min/1.73     Narrative:      GFR Categories in Chronic Kidney Disease (CKD)      GFR Category          GFR (mL/min/1.73)    Interpretation  G1                     90 or greater         Normal or high (1)  G2                      60-89                Mild decrease (1)  G3a                   45-59                Mild to moderate decrease  G3b                   30-44                Moderate to severe decrease  G4                    15-29                Severe decrease  G5                    14 or less           Kidney failure          (1)In the absence of evidence of kidney disease, neither GFR category G1 or G2 fulfill the criteria for CKD.    eGFR calculation 2021 CKD-EPI creatinine equation, which does not include race as a factor    Lipase [[id number]]  (Normal) Collected: 12/21/24 2353    Specimen: Blood Updated: 12/22/24 0019     Lipase 25 U/L     Lactic Acid, Plasma [[id number]]  (Abnormal) Collected: 12/21/24 2353    Specimen: Blood Updated: 12/22/24 0029     Lactate 2.2 mmol/L     Urinalysis With Culture If Indicated - Urine, Clean Catch [[id number]]  (Abnormal) Collected: 12/22/24 0033    Specimen: Urine, Clean Catch Updated: 12/22/24 0054     Color, UA Yellow     Appearance, UA Cloudy     pH, UA 6.0     Specific  Gravity, UA 1.018     Glucose, UA Negative     Ketones, UA Negative     Bilirubin, UA Negative     Blood, UA Negative     Protein, UA Negative     Leuk Esterase, UA Moderate (2+)     Nitrite, UA Negative     Urobilinogen, UA 0.2 E.U./dL    Narrative:      In absence of clinical symptoms, the presence of pyuria, bacteria, and/or nitrites on the urinalysis result does not correlate with infection.    Pregnancy, Urine - Urine, Clean Catch [[id number]]  (Normal) Collected: 12/22/24 0033    Specimen: Urine, Clean Catch Updated: 12/22/24 0053     HCG, Urine QL Negative    Urinalysis, Microscopic Only - Urine, Clean Catch [[id number]]  (Abnormal) Collected: 12/22/24 0033    Specimen: Urine, Clean Catch Updated: 12/22/24 0107     RBC, UA None Seen /HPF      WBC, UA 11-20 /HPF      Bacteria, UA 2+ /HPF      Squamous Epithelial Cells, UA 3-6 /HPF      Hyaline Casts, UA 3-6 /LPF      Mucus, UA Moderate/2+ /HPF      Methodology Manual Light Microscopy    Urine Culture - Urine, Urine, Clean Catch [[id number]] Collected: 12/22/24 0033    Specimen: Urine, Clean Catch Updated: 12/22/24 0107             CT Abdomen Pelvis Without Contrast    Result Date: 12/22/2024  FINAL REPORT TECHNIQUE: null CLINICAL HISTORY: Worsening abdominal discomfort, epigastric region; patient states she has pancreatic mass, eval pancreatitis; Iodine allergy COMPARISON: null FINDINGS: Exam: CT abdomen and pelvis with IV contrast. Comparison: None Findings: No free air. No solid liver mass. No calcified gallstones. No clinically significant biliary ductal dilation. No splenomegaly or suspicious splenic lesions. No solid or cystic pancreatic mass. No pancreatic ductal dilation. No acute inflammatory changes. Adrenal glands without nodule. No suspicious renal mass. No hydronephrosis. No significant stranding. Bladder without acute pathology. Dense colonic fecal retention without obstruction. No evidence for acute appendicitis. No adenopathy. No abdominal aortic  aneurysm. 2.9 x 3.1 x 2.9 cm left adnexal cyst likely ovarian. Small fat containing umbilical hernia. No acute osseous pathology.     Impression: IMPRESSION: Dense colonic fecal retention without obstruction. No evidence for acute appendicitis. 2.9 x 3.1 x 2.9 cm left adnexal cyst likely ovarian. No stones or hydronephrosis. No visible pancreatic mass within limits of noncontrast exam. Authenticated and Electronically Signed by Hilda Do MD on 12/22/2024 01:33:16 AM        [facility]      Initial impression of presenting illness: Patient is a 21-year-old female presenting for evaluation of left upper quadrant abdominal discomfort, diagnosed with a pancreatic mass approximately 2 months ago, supposed to follow-up with GI this past Monday but missed her appointment secondary to having an episode like this at home where she feel like she cannot leave home due to the pain and nausea    DDX: includes but is not limited to: Pancreatic mass, pancreatitis, gallbladder abnormality, colitis, diverticulitis, colonic mass, IBS, irritable bowel syndrome, viral illness, fibromyalgia, constipation, bowel obstruction, others    Patient arrives hemodynamically stable afebrile nontachycardic nontachypneic nonhypoxic with vitals interpreted by myself.     Pertinent features from physical exam: Patient uncomfortable appearing, not ill-appearing, oropharynx is clear, abdomen soft, patient does have tenderness palpation of left upper quadrant of the abdomen and epigastric region without peritonitis..    Initial diagnostic plan: CBC CMP lactic acid lipase urinalysis urine pregnancy CT abdomen pelvis without contrast given patient has anaphylaxis to iodine    Results from initial plan were reviewed and interpreted by me revealing urinalysis with moderate leukocytes, 11-20 white blood cells and 2+ bacteria concerning for urinary tract infection, urine hCG is negative.  Lactic acid 2.2.  CMP with normal renal and liver studies, glucose of  129, lipase normal, CBC is unremarkable.    Diagnostic information from other sources: Record reviewed    Interventions / Re-evaluation: Compazine, Benadryl, Toradol, 500 cc bolus of fluids, Rocephin for suspected UTI, additional 500 cc bolus of fluids.  Patient declined morphine for pain control.  She is feeling somewhat better and appropriate for discharge at this time.  No episodes of vomiting witnessed in the emergency department.  Lactic was minimally elevated, no indication for repeat as patient does not exhibit criteria for sepsis.     Results/clinical rationale were discussed with patient at bedside she will follow-up with gastroenterology as planned.  Sent Phenergan, Keflex, cultures pending.  Suspect some of her symptoms may be related to diffuse colonic fecal retention, there was no pancreatic mass visualized on CT imaging.  Pancreatitis not suspected as pancreas appeared normal on CT imaging and lipase was normal.  No other acute emergent intra-abdominal pathology identified on workup today.    Consultations/Discussion of results with other physicians: N/A    Disposition plan: Discharge, recommended mag citrate, and MiraLAX thereafter to help with fecal retention, this may be the result of some of the patient's abdominal discomfort, there is no pancreatic mass seen, previous CT was also reviewed with no pancreatic mass seen.  Recommended she continue to follow-up with GI, will treat urinary tract infection with Keflex, twice daily.  Patient received Rocephin here in the emergency department urine culture pending.  -----    Final diagnoses:   Constipation, unspecified constipation type   Urinary tract infection without hematuria, site unspecified   Cyst of left ovary   Abdominal pain, unspecified abdominal location          Devante Tong PA-C  12/22/24 0153

## 2024-12-23 LAB — BACTERIA SPEC AEROBE CULT: NORMAL
